# Patient Record
Sex: FEMALE | Race: BLACK OR AFRICAN AMERICAN | Employment: UNEMPLOYED | URBAN - METROPOLITAN AREA
[De-identification: names, ages, dates, MRNs, and addresses within clinical notes are randomized per-mention and may not be internally consistent; named-entity substitution may affect disease eponyms.]

---

## 2020-11-03 ENCOUNTER — APPOINTMENT (OUTPATIENT)
Dept: GENERAL RADIOLOGY | Age: 85
End: 2020-11-03
Payer: MEDICARE

## 2020-11-03 ENCOUNTER — HOSPITAL ENCOUNTER (EMERGENCY)
Age: 85
Discharge: HOME | End: 2020-11-03
Attending: EMERGENCY MEDICINE
Payer: MEDICARE

## 2020-11-03 VITALS
DIASTOLIC BLOOD PRESSURE: 74 MMHG | HEART RATE: 72 BPM | TEMPERATURE: 98.9 F | OXYGEN SATURATION: 96 % | SYSTOLIC BLOOD PRESSURE: 123 MMHG | RESPIRATION RATE: 18 BRPM

## 2020-11-03 PROCEDURE — 73130 X-RAY EXAM OF HAND: CPT

## 2020-11-03 PROCEDURE — 6370000000 HC RX 637 (ALT 250 FOR IP): Performed by: PHYSICIAN ASSISTANT

## 2020-11-03 PROCEDURE — 99283 EMERGENCY DEPT VISIT LOW MDM: CPT

## 2020-11-03 PROCEDURE — 73030 X-RAY EXAM OF SHOULDER: CPT

## 2020-11-03 RX ORDER — HYDROCODONE BITARTRATE AND ACETAMINOPHEN 5; 325 MG/1; MG/1
1 TABLET ORAL ONCE
Status: COMPLETED | OUTPATIENT
Start: 2020-11-03 | End: 2020-11-03

## 2020-11-03 RX ORDER — HYDROCODONE BITARTRATE AND ACETAMINOPHEN 5; 325 MG/1; MG/1
1 TABLET ORAL EVERY 6 HOURS PRN
Qty: 10 TABLET | Refills: 0 | Status: SHIPPED | OUTPATIENT
Start: 2020-11-03 | End: 2020-11-06

## 2020-11-03 RX ORDER — DOCUSATE SODIUM 100 MG/1
100 CAPSULE, LIQUID FILLED ORAL ONCE
Qty: 1 CAPSULE | Refills: 0 | Status: SHIPPED | OUTPATIENT
Start: 2020-11-03 | End: 2020-11-03

## 2020-11-03 RX ORDER — CEPHALEXIN 500 MG/1
500 CAPSULE ORAL 4 TIMES DAILY
Qty: 28 CAPSULE | Refills: 0 | Status: SHIPPED | OUTPATIENT
Start: 2020-11-03 | End: 2020-11-10

## 2020-11-03 RX ORDER — NAPROXEN 500 MG/1
500 TABLET ORAL 2 TIMES DAILY
Qty: 20 TABLET | Refills: 0 | Status: SHIPPED | OUTPATIENT
Start: 2020-11-03 | End: 2021-10-18 | Stop reason: ALTCHOICE

## 2020-11-03 RX ORDER — AMLODIPINE BESYLATE 10 MG/1
10 TABLET ORAL DAILY
COMMUNITY
End: 2021-10-15 | Stop reason: SDUPTHER

## 2020-11-03 RX ORDER — CARVEDILOL 3.12 MG/1
3.12 TABLET ORAL 2 TIMES DAILY WITH MEALS
COMMUNITY
End: 2021-10-15 | Stop reason: SDUPTHER

## 2020-11-03 RX ORDER — ATORVASTATIN CALCIUM 40 MG/1
40 TABLET, FILM COATED ORAL DAILY
COMMUNITY
End: 2021-10-15 | Stop reason: SDUPTHER

## 2020-11-03 RX ORDER — DONEPEZIL HYDROCHLORIDE 10 MG/1
10 TABLET, ORALLY DISINTEGRATING ORAL NIGHTLY
Status: ON HOLD | COMMUNITY
End: 2021-11-19 | Stop reason: HOSPADM

## 2020-11-03 RX ORDER — VITAMIN B COMPLEX
2000 TABLET ORAL DAILY
COMMUNITY
End: 2021-10-15 | Stop reason: SDUPTHER

## 2020-11-03 RX ORDER — NAPROXEN 250 MG/1
500 TABLET ORAL ONCE
Status: COMPLETED | OUTPATIENT
Start: 2020-11-03 | End: 2020-11-03

## 2020-11-03 RX ADMIN — NAPROXEN 500 MG: 250 TABLET ORAL at 20:49

## 2020-11-03 RX ADMIN — HYDROCODONE BITARTRATE AND ACETAMINOPHEN 1 TABLET: 5; 325 TABLET ORAL at 20:49

## 2020-11-03 ASSESSMENT — PAIN SCALES - GENERAL
PAINLEVEL_OUTOF10: 10
PAINLEVEL_OUTOF10: 10

## 2020-11-03 ASSESSMENT — ENCOUNTER SYMPTOMS
NAUSEA: 0
VOMITING: 0
ABDOMINAL PAIN: 0
SHORTNESS OF BREATH: 0
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
## 2020-11-04 NOTE — ED NOTES
Bed: [facility]-03  Expected date:   Expected time:   Means of arrival:   Comments:  Nilo Bentley RN  11/03/20 7662

## 2020-11-09 ENCOUNTER — NURSE TRIAGE (OUTPATIENT)
Dept: OTHER | Facility: CLINIC | Age: 85
End: 2020-11-09

## 2020-11-09 NOTE — TELEPHONE ENCOUNTER
[facility]. Brief description of triage: swollen fingers left     Triage indicates for patient to be seen today or tomorrow    Care advice provided, patient verbalizes understanding; denies any other questions or concerns; instructed to call back for any new or worsening symptoms. Writer provided warm transfer to [place] [facility] for appointment scheduling. Patient grandson asking to speak with [address] regarding complaints with hospital in [place], [place]. I explained to him that any facility would be unable to speak as to another facilities procedures and protocols and it would be best to direct any questions he has for that hospital to them directly. He states that he has spoken to them and was not satisfied with their answers. I again reaffirmed that no facility would be able to answer his questions regarding the rules an operating procedures of another. Attention Provider: Thank you for allowing me to participate in the care of your patient. The patient was connected to triage in response to information provided to the [facility]. Please do not respond through this encounter as the response is not directed to a shared pool. Reason for Disposition   Followed an injury   Finger joint can't be opened (straightened) or closed (bent) completely    Answer Assessment - Initial Assessment Questions  1. ONSET: \"When did the pain start? \"       About two weeks ago    2. LOCATION and RADIATION: \"Where is the pain located? \"  (e.g., fingertip, around nail, joint, entire   finger)       Right index finger and thumb    3. SEVERITY: \"How bad is the pain? \" \"What does it keep you from doing? \"   (Scale 1-10; or mild, moderate, severe)   - MILD - doesn't interfere with normal activities    - MODERATE - interferes with normal activities or awakens from sleep   - SEVERE - excruciating pain, unable to hold a glass of water or bend finger even a little      10/10    4. APPEARANCE: \"What does the finger look like? \" (e.g., redness, swelling, bruising, pallor)      Swollen     5. WORK OR EXERCISE: \"Has there been any recent work or exercise that involved this part of the body? \"      Tanja Gilbertoo two weeks ago and dislocated shoulder same side    6. CAUSE: \"What do you think is causing the pain? \"      Unsure    7. AGGRAVATING FACTORS: \"What makes the pain worse? \" (e.g., using computer)      Patient refuses to use it normally, she favors finger and thumb    8. OTHER SYMPTOMS: \"Do you have any other symptoms? \" (e.g., fever, neck pain, numbness)      Not since antibiotcs    9. PREGNANCY: \"Is there any chance you are pregnant? \" \"When was your last menstrual period? \"      N/A    Protocols used:  FINGER PAIN-ADULT-[place], FINGER INJURY-ADULT-[place]

## 2020-11-10 ENCOUNTER — OFFICE VISIT (OUTPATIENT)
Dept: FAMILY MEDICINE CLINIC | Age: 85
End: 2020-11-10
Payer: COMMERCIAL

## 2020-11-10 VITALS
OXYGEN SATURATION: 100 % | DIASTOLIC BLOOD PRESSURE: 76 MMHG | HEIGHT: 60 IN | BODY MASS INDEX: 28.86 KG/M2 | HEART RATE: 73 BPM | WEIGHT: 147 LBS | SYSTOLIC BLOOD PRESSURE: 132 MMHG

## 2020-11-10 PROCEDURE — 99202 OFFICE O/P NEW SF 15 MIN: CPT | Performed by: FAMILY MEDICINE

## 2020-11-10 ASSESSMENT — ENCOUNTER SYMPTOMS
COUGH: 0
BLOOD IN STOOL: 0
SHORTNESS OF BREATH: 0
NAUSEA: 0
SORE THROAT: 0
ABDOMINAL PAIN: 0
VOMITING: 0
CONSTIPATION: 1

## 2020-11-10 NOTE — PATIENT INSTRUCTIONS
Patient Education      Go to the ER ASAP if you develop any chest pain, shortness of breath, facial droop, slurred speech, weakness/numbness in your arms or legs or double vision! Hand Pain: Care Instructions  Your Care Instructions     Common causes of hand pain are overuse and injuries, such as might happen during sports or home repair projects. Everyday wear and tear, especially as you get older, also can cause hand pain. Most minor hand injuries will heal on their own, and home treatment is usually all you need to do. If you have sudden and severe pain, you may need tests and treatment. Follow-up care is a key part of your treatment and safety. Be sure to make and go to all appointments, and call your doctor if you are having problems. It's also a good idea to know your test results and keep a list of the medicines you take. How can you care for yourself at home? · Take pain medicines exactly as directed. ? If the doctor gave you a prescription medicine for pain, take it as prescribed. ? If you are not taking a prescription pain medicine, ask your doctor if you can take an over-the-counter medicine. · Rest and protect your hand. Take a break from any activity that may cause pain. · Put ice or a cold pack on your hand for 10 to 20 minutes at a time. Put a thin cloth between the ice and your skin. · Prop up the sore hand on a pillow when you ice it or anytime you sit or lie down during the next 3 days. Try to keep it above the level of your heart. This will help reduce swelling. · If your doctor recommends a sling, splint, or elastic bandage to support your hand, wear it as directed. When should you call for help? Call 911 anytime you think you may need emergency care. For example, call if:    · Your hand turns cool or pale or changes color.    Call your doctor now or seek immediate medical care if:    · You cannot move your hand.     · Your hand pops, moves out of its normal position, and then returns to its normal position.     · You have signs of infection, such as:  ? Increased pain, swelling, warmth, or redness. ? Red streaks leading from the sore area. ? Pus draining from a place on your hand. ? A fever.     · Your hand feels numb or tingly. Watch closely for changes in your health, and be sure to contact your doctor if:    · Your hand feels unstable when you try to use it.     · You do not get better as expected.     · You have any new symptoms, such as swelling.     · Bruises from an injury to your hand last longer than 2 weeks. Where can you learn more? Go to https://[employer]What the [employer]rend. org and sign in to your [employer] account. Enter R273 in the [employer] box to learn more about \"Hand Pain: Care Instructions. \"     If you do not have an account, please click on the \"Sign Up Now\" link. Current as of: June 26, 2019               Content Version: 12.6  © 0642-8043 [employer]. Care instructions adapted under license by [facility] ([facility]). If you have questions about a medical condition or this instruction, always ask your healthcare professional. [place] [address] any warranty or liability for your use of this information.
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
## 2020-11-12 ENCOUNTER — OFFICE VISIT (OUTPATIENT)
Dept: ORTHOPEDIC SURGERY | Age: 85
End: 2020-11-12
Payer: COMMERCIAL

## 2020-11-12 VITALS — HEIGHT: 60 IN | BODY MASS INDEX: 28.86 KG/M2 | TEMPERATURE: 98.2 F | WEIGHT: 147 LBS

## 2020-11-12 PROBLEM — S60.221A CONTUSION OF RIGHT HAND: Status: ACTIVE | Noted: 2020-11-12

## 2020-11-12 PROCEDURE — 99203 OFFICE O/P NEW LOW 30 MIN: CPT | Performed by: ORTHOPAEDIC SURGERY
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
## 2020-12-07 ENCOUNTER — OFFICE VISIT (OUTPATIENT)
Dept: ORTHOPEDIC SURGERY | Age: 85
End: 2020-12-07
Payer: COMMERCIAL

## 2020-12-07 VITALS — HEIGHT: 60 IN | WEIGHT: 147 LBS | BODY MASS INDEX: 28.86 KG/M2 | TEMPERATURE: 97.3 F

## 2020-12-07 PROCEDURE — 99212 OFFICE O/P EST SF 10 MIN: CPT | Performed by: ORTHOPAEDIC SURGERY

## 2021-01-25 ENCOUNTER — TELEPHONE (OUTPATIENT)
Dept: FAMILY MEDICINE CLINIC | Age: 86
End: 2021-01-25

## 2021-01-25 NOTE — TELEPHONE ENCOUNTER
----- Message from Timmy Abbott sent at 1/25/2021 12:52 PM EST -----  Subject: Appointment Request    Reason for Call: Routine Existing Condition Follow Up    QUESTIONS  Type of Appointment? Established Patient  Reason for appointment request? Other - NO APPT SHOWN   Additional Information for Provider? PT ESTABLISHED WITH DR. POWELL 11/10/20 AND JOEL FOUNTAIN / ENEDELIA CALLED TO SCHEDULE THE PT FOR A FOLLOW UP   APPOINTMENT. PLEASE CALL TO SCHEDULE. PC NOT LISTED IN SALES FORCE.  ---------------------------------------------------------------------------  --------------  CALL BACK INFO  What is the best way for the office to contact you? OK to leave message on   voicemail  Do not leave any message   patient will call back for answer  Preferred Call Back Phone Number? [phone]  ---------------------------------------------------------------------------  --------------  SCRIPT ANSWERS  Relationship to Patient? Other  Representative Name? PATY ZARATE   Additional information verified (besides Name and Date of Birth)? Address  Appointment reason? Well Care/Follow Ups  Select a Well Care/Follow Ups appointment reason? Adult Existing Condition   Follow Up [Diabetes   CHF   COPD   Hypertension/Blood Pressure Check]  (Is the patient requesting to be seen urgently for their symptoms?)? No  Is this follow up request related to routine Diabetes Management? No  Are you having any new concerns about your existing condition? No  Have you been diagnosed with   tested for   or told that you are suspected of having COVID-19 (Coronavirus)? No  Have you had a fever or taken medication to treat a fever within the past   3 days? No  Have you had a cough   shortness of breath or flu-like symptoms within the past 3 days? No  Do you currently have flu-like symptoms including fever or chills   cough   shortness of breath   or difficulty breathing   or new loss of taste or smell?  No  (Service Expert - click yes below to proceed with [employer] As Usual   Scheduling)?  Yes

## 2021-01-26 ENCOUNTER — OFFICE VISIT (OUTPATIENT)
Dept: FAMILY MEDICINE CLINIC | Age: 86
End: 2021-01-26
Payer: MEDICARE

## 2021-01-26 VITALS
SYSTOLIC BLOOD PRESSURE: 122 MMHG | WEIGHT: 133 LBS | RESPIRATION RATE: 20 BRPM | HEIGHT: 61 IN | BODY MASS INDEX: 25.11 KG/M2 | DIASTOLIC BLOOD PRESSURE: 70 MMHG | HEART RATE: 78 BPM | TEMPERATURE: 97.9 F | OXYGEN SATURATION: 97 %

## 2021-01-26 DIAGNOSIS — R01.1 HEART MURMUR: ICD-10-CM

## 2021-01-26 DIAGNOSIS — R55 PRE-SYNCOPE: Primary | ICD-10-CM

## 2021-01-26 DIAGNOSIS — I25.10 CORONARY ARTERY DISEASE INVOLVING NATIVE CORONARY ARTERY OF NATIVE HEART WITHOUT ANGINA PECTORIS: ICD-10-CM

## 2021-01-26 DIAGNOSIS — I10 ESSENTIAL HYPERTENSION: ICD-10-CM

## 2021-01-26 PROCEDURE — 99214 OFFICE O/P EST MOD 30 MIN: CPT | Performed by: FAMILY MEDICINE

## 2021-01-26 ASSESSMENT — ENCOUNTER SYMPTOMS
ABDOMINAL PAIN: 0
NAUSEA: 0
COUGH: 0
SHORTNESS OF BREATH: 0
BLOOD IN STOOL: 0
SORE THROAT: 0
VOMITING: 0

## 2021-01-26 NOTE — PROGRESS NOTES
Chief Complaint   Patient presents with    Loss of Consciousness     pt had on syncopal episode yesterday, mothing since         Cassidy Gaspar is a 80 y.o. female who presents complaining of a pre syncopal episode yesterday after taking a shower and rinsing her mouth with peroxide. Pt states that she did feel dizzy but did not lose consciousness and denies any associated CP, SOB or fever. Pt also did not skip a meal    Pt has a hx of HTN, CAD and dyslipidemia and is on norvasc, Baby ASA, lipitor and coreg      Pt has a hx of Alzheimer's dementia and used to be on aricept medication in the past    Of note pt did see Hand Surgery as directed regarding her R hand pain/swelling and was Dx with a contusion and has been doing home physical therapy with improvement     Pt is a nonsmoker and denies alcohol use      Review of Systems   Constitutional: Negative for fatigue and fever. HENT: Negative for nosebleeds and sore throat. Eyes:        Negative blurred vision or diplopia   Respiratory: Negative for cough and shortness of breath. Cardiovascular: Negative for chest pain, palpitations and leg swelling. Gastrointestinal: Negative for abdominal pain, blood in stool, nausea and vomiting. Negative melena or indigestion   Endocrine: Negative for polydipsia and polyuria. Genitourinary: Negative for dysuria and hematuria. Musculoskeletal: Negative for arthralgias. Skin: Negative for rash. Neurological: Positive for dizziness (episode yesterday). Negative for seizures, syncope, speech difficulty, weakness and headaches. Psychiatric/Behavioral: Negative for dysphoric mood. The patient is not nervous/anxious. Vitals:    01/26/21 1314   BP: 122/70   Pulse: 78   Resp: 20   Temp: 97.9 °F (36.6 °C)   SpO2: 97%         Physical Exam  Vitals signs reviewed. Constitutional:       General: She is not in acute distress.   HENT:      Mouth/Throat:      Mouth: Mucous membranes are moist.      Pharynx: Oropharynx is clear. Eyes:      General: No scleral icterus. Comments: Pink conjunctivae    Neck:      Thyroid: No thyromegaly. Comments: No carotid bruits noted B/L  Cardiovascular:      Heart sounds: Murmur (2/6 systolic) present. No friction rub. No gallop. Comments: Positive occasional ectopic beat noted  Pulmonary:      Effort: Pulmonary effort is normal.      Breath sounds: Normal breath sounds. Abdominal:      Palpations: Abdomen is soft. Tenderness: There is no abdominal tenderness. Musculoskeletal:      Comments: No leg edema noted B/L   Lymphadenopathy:      Cervical: No cervical adenopathy. Skin:     Findings: No rash. Neurological:      Mental Status: She is alert. Comments: Cranial nerves 2 - 12 grossly intact; Muscle strength 5/5 throughout   Psychiatric:         Mood and Affect: Mood normal.         ASSESSMENT AND PLAN    1. Essential hypertension  Patient clinically stable on present medications  - CBC Auto Differential; Future  - Lipid Panel; Future  - Comprehensive Metabolic Panel; Future  - TSH with Reflex; Future  - [facility]acria [facility], [facility]    2. Coronary artery disease involving native coronary artery of native heart without angina pectoris  Patient clinically stable on present medications and denies any CP or SOB  - Lipid Panel; Future  - Comprehensive Metabolic Panel; Future  - [facility] Garcia [facility], [facility]    3. Pre-syncope  Pt with an episode of dizziness and presyncope yesterday and unsure as to etiology and will check bloodwork for further evaluation. May be secondary to valvular heart disease given pt's noted heart murmur on PE and will Refer pt to Cardiology for evaluation and 2D Echo. Pt is with a nonfocal neuro exam  - CBC Auto Differential; Future  - Comprehensive Metabolic Panel; Future  - TSH with Reflex; Future  - [facility] Garcia [facility], [facility]    4.  Heart murmur  - [facility] Angy Vega MD, Cardiology, Leti Reece MD      Return in about 2 months (around 3/26/2021). Patient Instructions       Patient Education      Go to the ER ASAP if you develop any chest pain, shortness of breath, facial droop, slurred speech, weakness/numbness in your arms or legs or double vision! Fainting: Care Instructions  Your Care Instructions     When you faint, or pass out, you lose consciousness for a short time. A brief drop in blood flow to the brain often causes it. When you fall or lie down, more blood flows to your brain and you regain consciousness. Emotional stress, pain, or overheatingespecially if you have been standingcan make you faint. In these cases, fainting is usually not serious. But fainting can be a sign of a more serious problem. Your doctor may want you to have more tests to rule out other causes. The treatment you need depends on the reason why you fainted. The doctor has checked you carefully, but problems can develop later. If you notice any problems or new symptoms, get medical treatment right away. Follow-up care is a key part of your treatment and safety. Be sure to make and go to all appointments, and call your doctor if you are having problems. It's also a good idea to know your test results and keep a list of the medicines you take. How can you care for yourself at home? · Drink plenty of fluids to prevent dehydration. If you have kidney, heart, or liver disease and have to limit fluids, talk with your doctor before you increase your fluid intake. When should you call for help? Call 911 anytime you think you may need emergency care. For example, call if:    · You have symptoms of a heart problem. These may include:  ? Chest pain or pressure. ? Severe trouble breathing. ? A fast or irregular heartbeat. ? Lightheadedness or sudden weakness. ? Coughing up pink, foamy mucus. ? Passing out.   After you call 911, the  may tell you to chew 1 adult-strength or 2 to 4 low-dose aspirin. Wait for an ambulance. Do not try to drive yourself.     · You have symptoms of a stroke. These may include:  ? Sudden numbness, tingling, weakness, or loss of movement in your face, arm, or leg, especially on only one side of your body. ? Sudden vision changes. ? Sudden trouble speaking. ? Sudden confusion or trouble understanding simple statements. ? Sudden problems with walking or balance. ? A sudden, severe headache that is different from past headaches.     · You passed out (lost consciousness) again. Watch closely for changes in your health, and be sure to contact your doctor if:    · You do not get better as expected. Where can you learn more? Go to https://[employer]ICES.[employer]. org and sign in to your [employer] account. Enter F460 in the [employer] box to learn more about \"Fainting: Care Instructions. \"     If you do not have an account, please click on the \"Sign Up Now\" link. Current as of: June 26, 2019               Content Version: 12.6  © 3438-5576 [employer] Incorporated. Care instructions adapted under license by [facility] ([facility]). If you have questions about a medical condition or this instruction, always ask your healthcare professional. Meeta Chase any warranty or liability for your use of this information.

## 2021-01-26 NOTE — PATIENT INSTRUCTIONS
Patient Education      Go to the ER ASAP if you develop any chest pain, shortness of breath, facial droop, slurred speech, weakness/numbness in your arms or legs or double vision! Fainting: Care Instructions  Your Care Instructions     When you faint, or pass out, you lose consciousness for a short time. A brief drop in blood flow to the brain often causes it. When you fall or lie down, more blood flows to your brain and you regain consciousness. Emotional stress, pain, or overheatingespecially if you have been standingcan make you faint. In these cases, fainting is usually not serious. But fainting can be a sign of a more serious problem. Your doctor may want you to have more tests to rule out other causes. The treatment you need depends on the reason why you fainted. The doctor has checked you carefully, but problems can develop later. If you notice any problems or new symptoms, get medical treatment right away. Follow-up care is a key part of your treatment and safety. Be sure to make and go to all appointments, and call your doctor if you are having problems. It's also a good idea to know your test results and keep a list of the medicines you take. How can you care for yourself at home? · Drink plenty of fluids to prevent dehydration. If you have kidney, heart, or liver disease and have to limit fluids, talk with your doctor before you increase your fluid intake. When should you call for help? Call 911 anytime you think you may need emergency care. For example, call if:    · You have symptoms of a heart problem. These may include:  ? Chest pain or pressure. ? Severe trouble breathing. ? A fast or irregular heartbeat. ? Lightheadedness or sudden weakness. ? Coughing up pink, foamy mucus. ? Passing out. After you call 911, the  may tell you to chew 1 adult-strength or 2 to 4 low-dose aspirin. Wait for an ambulance. Do not try to drive yourself.   · You have symptoms of a stroke. These may include:  ? Sudden numbness, tingling, weakness, or loss of movement in your face, arm, or leg, especially on only one side of your body. ? Sudden vision changes. ? Sudden trouble speaking. ? Sudden confusion or trouble understanding simple statements. ? Sudden problems with walking or balance. ? A sudden, severe headache that is different from past headaches.     · You passed out (lost consciousness) again. Watch closely for changes in your health, and be sure to contact your doctor if:    · You do not get better as expected. Where can you learn more? Go to https://FIGSpepiceweb.[employer]is. org and sign in to your [employer] account. Enter F619 in the [employer] box to learn more about \"Fainting: Care Instructions. \"     If you do not have an account, please click on the \"Sign Up Now\" link. Current as of: June 26, 2019               Content Version: 12.6  © 0287-6851 [employer]. Care instructions adapted under license by [facility] ([facility]). If you have questions about a medical condition or this instruction, always ask your healthcare professional. [place] [address] any warranty or liability for your use of this information.

## 2021-02-05 ENCOUNTER — OFFICE VISIT (OUTPATIENT)
Dept: CARDIOLOGY CLINIC | Age: 86
End: 2021-02-05
Payer: MEDICARE

## 2021-02-05 ENCOUNTER — TELEPHONE (OUTPATIENT)
Dept: CARDIOLOGY CLINIC | Age: 86
End: 2021-02-05

## 2021-02-05 VITALS
SYSTOLIC BLOOD PRESSURE: 130 MMHG | DIASTOLIC BLOOD PRESSURE: 78 MMHG | WEIGHT: 131.8 LBS | TEMPERATURE: 97.8 F | HEIGHT: 61 IN | BODY MASS INDEX: 24.88 KG/M2

## 2021-02-05 DIAGNOSIS — R01.1 MURMUR, CARDIAC: ICD-10-CM

## 2021-02-05 DIAGNOSIS — F03.90 DEMENTIA WITHOUT BEHAVIORAL DISTURBANCE, UNSPECIFIED DEMENTIA TYPE: ICD-10-CM

## 2021-02-05 DIAGNOSIS — I10 BENIGN ESSENTIAL HTN: ICD-10-CM

## 2021-02-05 DIAGNOSIS — I25.10 CORONARY ARTERY DISEASE INVOLVING NATIVE CORONARY ARTERY OF NATIVE HEART WITHOUT ANGINA PECTORIS: ICD-10-CM

## 2021-02-05 DIAGNOSIS — I10 ESSENTIAL HYPERTENSION: ICD-10-CM

## 2021-02-05 DIAGNOSIS — R55 NEAR SYNCOPE: ICD-10-CM

## 2021-02-05 DIAGNOSIS — I25.10 CORONARY ARTERY DISEASE INVOLVING NATIVE CORONARY ARTERY OF NATIVE HEART WITHOUT ANGINA PECTORIS: Primary | ICD-10-CM

## 2021-02-05 DIAGNOSIS — Z00.00 EXAMINATION: ICD-10-CM

## 2021-02-05 DIAGNOSIS — R55 PRE-SYNCOPE: ICD-10-CM

## 2021-02-05 LAB
A/G RATIO: 0.8 (ref 1.1–2.2)
ALBUMIN SERPL-MCNC: 3.5 G/DL (ref 3.4–5)
ALP BLD-CCNC: 107 U/L (ref 40–129)
ALT SERPL-CCNC: 13 U/L (ref 10–40)
ANION GAP SERPL CALCULATED.3IONS-SCNC: 14 MMOL/L (ref 3–16)
AST SERPL-CCNC: 30 U/L (ref 15–37)
BASOPHILS ABSOLUTE: 0 K/UL (ref 0–0.2)
BASOPHILS RELATIVE PERCENT: 0.3 %
BILIRUB SERPL-MCNC: 0.5 MG/DL (ref 0–1)
BUN BLDV-MCNC: 12 MG/DL (ref 7–20)
CALCIUM SERPL-MCNC: 9.2 MG/DL (ref 8.3–10.6)
CHLORIDE BLD-SCNC: 104 MMOL/L (ref 99–110)
CHOLESTEROL, TOTAL: 103 MG/DL (ref 0–199)
CO2: 24 MMOL/L (ref 21–32)
CREAT SERPL-MCNC: 0.9 MG/DL (ref 0.6–1.2)
EOSINOPHILS ABSOLUTE: 0.1 K/UL (ref 0–0.6)
EOSINOPHILS RELATIVE PERCENT: 0.7 %
GFR AFRICAN AMERICAN: >60
GFR NON-AFRICAN AMERICAN: 59
GLOBULIN: 4.4 G/DL
GLUCOSE BLD-MCNC: 119 MG/DL (ref 70–99)
HCT VFR BLD CALC: 35.8 % (ref 36–48)
HDLC SERPL-MCNC: 37 MG/DL (ref 40–60)
HEMOGLOBIN: 11.6 G/DL (ref 12–16)
LDL CHOLESTEROL CALCULATED: 41 MG/DL
LYMPHOCYTES ABSOLUTE: 1.8 K/UL (ref 1–5.1)
LYMPHOCYTES RELATIVE PERCENT: 19.1 %
MCH RBC QN AUTO: 29.2 PG (ref 26–34)
MCHC RBC AUTO-ENTMCNC: 32.4 G/DL (ref 31–36)
MCV RBC AUTO: 90.2 FL (ref 80–100)
MONOCYTES ABSOLUTE: 0.7 K/UL (ref 0–1.3)
MONOCYTES RELATIVE PERCENT: 7.5 %
NEUTROPHILS ABSOLUTE: 6.9 K/UL (ref 1.7–7.7)
NEUTROPHILS RELATIVE PERCENT: 72.4 %
PDW BLD-RTO: 15 % (ref 12.4–15.4)
PLATELET # BLD: 245 K/UL (ref 135–450)
PMV BLD AUTO: 10.3 FL (ref 5–10.5)
POTASSIUM SERPL-SCNC: 3.9 MMOL/L (ref 3.5–5.1)
RBC # BLD: 3.98 M/UL (ref 4–5.2)
SODIUM BLD-SCNC: 142 MMOL/L (ref 136–145)
TOTAL PROTEIN: 7.9 G/DL (ref 6.4–8.2)
TRIGL SERPL-MCNC: 124 MG/DL (ref 0–150)
TSH REFLEX: 1.74 UIU/ML (ref 0.27–4.2)
VLDLC SERPL CALC-MCNC: 25 MG/DL
WBC # BLD: 9.6 K/UL (ref 4–11)

## 2021-02-05 PROCEDURE — 99204 OFFICE O/P NEW MOD 45 MIN: CPT | Performed by: INTERNAL MEDICINE

## 2021-02-05 PROCEDURE — 93000 ELECTROCARDIOGRAM COMPLETE: CPT | Performed by: INTERNAL MEDICINE

## 2021-02-05 NOTE — PROGRESS NOTES
80 y.o. here for syncope. Here with grandson/POA Denisha Nails.  Pt fell at home. Saw Dr. Doran Held recently as a new pt. Last week, the patient \"fell out. \" Legs got weak, eyes rolled back, but she did NOT pass out. She lost all strength and fell. Came back quick, felt ok, saw PCP, and was then referred for me. No problems since then  No cp. No sob. No n/v. No LE edema. Sleeps in bed, laying flat. No orthopnea or PND. PMH:  CAD  Hyperlipidemia  Essential HTN  Dementia    PSH: No prior cardiac procedures. Had hysterectomy. Social History:  Never smoked. No EtOH. FH: Not contributory    Review of Systems   General: No fevers, chills, fatigue, or night sweats. No abnormal changes in weight. HEENT: No blurry or decreased vision. No changes in hearing, nasal discharge or sore throat. Cardiovascular: See HPI. No cramping in legs or buttocks when walking. Respiratory: No cough, hemoptysis, or wheezing. No history of asthma. Gastrointestinal: No abdominal pain, hematochezia, melana, or history of GI ulcers. Genito-Urinary: No dysuria or hematuria. No urgency or polyuria. Musculoskeletal: No new complaints of joint pain, joint swelling or muscular weakness/soreness. Neurological: No dizziness or headaches. No numbness/tingling, speech problems or weakness. No history of a stroke or TIA. Psychological: No anxiety or depression. Hematological and Lymphatic: No abnormal bleeding or bruising, blood clots, jaundice. Endocrine: No malaise/lethargy, palpitations, polydipsia/polyuria, temperature intolerance or unexpected weight changes. Skin: No rashes or non-healing ulcers. PE:  Blood pressure 130/78, temperature 97.8 °F (36.6 °C), height 5' 1\" (1.549 m), weight 131 lb 12.8 oz (59.8 kg). General (appearance): Well devel. No distress  Eyes: Anicteric. Neck: No JVD  Ears/Nose/Mouth/Thorat: No cyanosis  CV: RRR. 2/6 otis. Early peaking. Good S2.   Respiratory:  Clear B, normal respiratory effort  GI: Abd s/nt/nd. Skin: Warm, dry. No rashes  Neuro/Psych: Alert and oriented self. Not to year or place ([address])  Ext:  No c/c. No pitting edema  Pulses:  2+ carotid    No results found for: WBC, HGB, HCT, MCV, PLT    No results found for: NA, K, CL, CO2, BUN, CREATININE, GLUCOSE, CALCIUM, PROT, LABALBU, BILITOT, ALKPHOS, AST, ALT, LABGLOM, GFRAA, AGRATIO, GLOB    No results found for: INR, PROTIME    A/P:  80 y.o. here for near syncope. 1. Coronary artery disease involving native coronary artery of native heart without angina pectoris    2. Benign essential HTN    3. Murmur, cardiac    4. Examination    5. Near syncope    6. Dementia without behavioral disturbance, unspecified dementia type ([facility] [address]      Recs:  -Discussed echo. They'd like to pursue to just to get answers on the heart.  -14 day zio patch  -F/U 4 mo  -CMP, lipids, cbc, tsh ordered. Becka Collins MD, [facility]

## 2021-02-26 ENCOUNTER — PROCEDURE VISIT (OUTPATIENT)
Dept: CARDIOLOGY CLINIC | Age: 86
End: 2021-02-26
Payer: MEDICARE

## 2021-02-26 DIAGNOSIS — I25.10 CORONARY ARTERY DISEASE INVOLVING NATIVE CORONARY ARTERY OF NATIVE HEART WITHOUT ANGINA PECTORIS: ICD-10-CM

## 2021-02-26 DIAGNOSIS — R55 NEAR SYNCOPE: ICD-10-CM

## 2021-02-26 DIAGNOSIS — R01.1 MURMUR, CARDIAC: ICD-10-CM

## 2021-02-26 LAB
LV EF: 63 %
LVEF MODALITY: NORMAL

## 2021-02-26 PROCEDURE — 93306 TTE W/DOPPLER COMPLETE: CPT | Performed by: INTERNAL MEDICINE

## 2021-05-10 ENCOUNTER — OFFICE VISIT (OUTPATIENT)
Dept: PRIMARY CARE CLINIC | Age: 86
End: 2021-05-10
Payer: MEDICARE

## 2021-05-10 VITALS
HEART RATE: 72 BPM | SYSTOLIC BLOOD PRESSURE: 130 MMHG | OXYGEN SATURATION: 98 % | BODY MASS INDEX: 25.32 KG/M2 | DIASTOLIC BLOOD PRESSURE: 82 MMHG | WEIGHT: 134 LBS

## 2021-05-10 DIAGNOSIS — I10 ESSENTIAL HYPERTENSION: Primary | ICD-10-CM

## 2021-05-10 DIAGNOSIS — I25.10 CORONARY ARTERY DISEASE INVOLVING NATIVE HEART WITHOUT ANGINA PECTORIS, UNSPECIFIED VESSEL OR LESION TYPE: ICD-10-CM

## 2021-05-10 DIAGNOSIS — R09.89 RESPIRATORY CRACKLES AT LEFT LUNG BASE: ICD-10-CM

## 2021-05-10 DIAGNOSIS — I38 VALVULAR HEART DISEASE: ICD-10-CM

## 2021-05-10 DIAGNOSIS — R60.0 BILATERAL LEG EDEMA: ICD-10-CM

## 2021-05-10 PROCEDURE — 99214 OFFICE O/P EST MOD 30 MIN: CPT | Performed by: FAMILY MEDICINE

## 2021-05-10 ASSESSMENT — ENCOUNTER SYMPTOMS
BLOOD IN STOOL: 0
SHORTNESS OF BREATH: 0
VOMITING: 0
NAUSEA: 0
ABDOMINAL PAIN: 0
RHINORRHEA: 1
COUGH: 1
SORE THROAT: 0

## 2021-05-10 ASSESSMENT — PATIENT HEALTH QUESTIONNAIRE - PHQ9
1. LITTLE INTEREST OR PLEASURE IN DOING THINGS: 0
2. FEELING DOWN, DEPRESSED OR HOPELESS: 0
SUM OF ALL RESPONSES TO PHQ9 QUESTIONS 1 & 2: 0

## 2021-05-10 NOTE — PATIENT INSTRUCTIONS
embolism). These may include:  ? Sudden chest pain. ? Trouble breathing. ? Coughing up blood. Call your doctor now or seek immediate medical care if:    · You have signs of a blood clot, such as:  ? Pain in your calf, back of the knee, thigh, or groin. ? Redness and swelling in your leg or groin.     · You have symptoms of infection, such as:  ? Increased pain, swelling, warmth, or redness. ? Red streaks or pus. ? A fever. Watch closely for changes in your health, and be sure to contact your doctor if:    · Your swelling is getting worse.     · You have new or worsening pain in your legs.     · You do not get better as expected. Where can you learn more? Go to https://Big Fish.InSightec. org and sign in to your [employer]jam account. Enter K007 in the [facility] box to learn more about \"Leg and Ankle Edema: Care Instructions. \"     If you do not have an account, please click on the \"Sign Up Now\" link. Current as of: February 26, 2020               Content Version: 12.8  © 8006-9984 Healthwise, Incorporated. Care instructions adapted under license by [facility] ([facility]). If you have questions about a medical condition or this instruction, always ask your healthcare professional. [address]  any warranty or liability for your use of this information.

## 2021-05-10 NOTE — PROGRESS NOTES
Chief Complaint   Patient presents with    1 Month Follow-Up     hand contusion/heart murmur         Sulma Roman is a 80 y.o. female who presents for followup visit regarding CAD, HTN, dyslipidemia and syncope. Pt did see Cardiology as directed regarding her syncope and had 2 D echo done which was unremarkable and was ordered a 14 day Zio patch monitor but pt kept on removing it during her sleep. Pt also had bloodwork [TSH, CBC, CMP, lipid panel] done which was unremarkable    Pt has a hx of HTN, CAD and dyslipidemia and is on norvasc, Baby ASA, lipitor and coreg      Pt has a hx of Alzheimer's dementia and used to be on aricept medication in the past     Pt is a nonsmoker and denies alcohol use. Pt did eat a salty meal last night      Cardiology note 02/05/2021  80 y.o. here for syncope. Here with grandson/POA Isi Carvalho.  Pt fell at home. Saw Dr. Liv Bell recently as a new pt. Last week, the patient \"fell out. \" Legs got weak, eyes rolled back, but she did NOT pass out. She lost all strength and fell. Came back quick, felt ok, saw PCP, and was then referred for me. No problems since then  No cp. No sob. No n/v. No LE edema. Sleeps in bed, laying flat. No orthopnea or PND. A/P:  80 y.o. here for near syncope. 1. Coronary artery disease involving native coronary artery of native heart without angina pectoris    2. Benign essential HTN    3. Murmur, cardiac    4. Examination    5. Near syncope    6. Dementia without behavioral disturbance, unspecified dementia type ([address]       Recs:  -Discussed echo. They'd like to pursue to just to get answers on the heart.  -14 day zio patch  -F/U 4 mo  -CMP, lipids, cbc, tsh ordered. 2D Echo 02/26/2021  Summary   Left ventricular cavity size is normal. There is moderately increased left   ventricular wall thickness. There is septal thickening noted, no obvious   evidence of outflow obstruction.  Overall left ventricular systolic function   appears normal. Ejection fraction is visually estimated to be 60-65%. No   regional wall motion abnormalities are noted. Grade I diastolic dysfunction   with normal LV filling pressures. Mild aortic regurgitation. Very small mobile density seen in the aortic   aspect of the right coronary cusp, likely small calcification. Mild pulmonic regurgitation present. Mild tricuspid regurgitation with an RVSP of 34mmHg. Review of Systems   Constitutional: Negative for fatigue and fever. HENT: Positive for rhinorrhea (daily). Negative for nosebleeds and sore throat. Eyes:        Negative blurred vision or diplopia   Respiratory: Positive for cough (dry at times for many years). Negative for shortness of breath. Cardiovascular: Negative for chest pain, palpitations and leg swelling. Gastrointestinal: Negative for abdominal pain, blood in stool, nausea and vomiting. Negative melena or indigestion   Endocrine: Negative for polydipsia and polyuria. Genitourinary: Negative for dysuria and hematuria. Musculoskeletal: Negative for arthralgias. Skin: Negative for rash. Neurological: Negative for dizziness, seizures, syncope, speech difficulty, weakness and headaches. Psychiatric/Behavioral: Negative for dysphoric mood. The patient is not nervous/anxious. Vitals:    05/10/21 1101   BP: 130/82   Pulse: 72   SpO2: 98%         Physical Exam  Vitals signs reviewed. Constitutional:       General: She is not in acute distress. HENT:      Mouth/Throat:      Mouth: Mucous membranes are moist.      Pharynx: Oropharynx is clear. Eyes:      General: No scleral icterus. Comments: Pink conjunctivae    Neck:      Thyroid: No thyromegaly. Comments: No carotid bruits noted B/L  Cardiovascular:      Heart sounds: Murmur (2/6 systolic) present. No friction rub. No gallop. Comments: Positive occasional ectopic beat noted  Pulmonary:      Effort: Pulmonary effort is normal. No respiratory distress.       Comments: Positive crackles noted in L lung base  Abdominal:      Palpations: Abdomen is soft. Tenderness: There is no abdominal tenderness. Musculoskeletal:      Comments: Positive  2 -3+ leg edema but no calf tendernous to palpation noted B/L   Lymphadenopathy:      Cervical: No cervical adenopathy. Skin:     Findings: No rash. Neurological:      Mental Status: She is alert. Comments: Cranial nerves 2 - 12 grossly intact; Muscle strength 5/5 throughout   Psychiatric:         Mood and Affect: Mood normal.         ASSESSMENT AND PLAN    1. Essential hypertension/Bilateral leg edema  Patient clinically stable on present medication but does have leg edema noted on PE and was told to follow a low sodium diet and will reevaluate pt in 6 weeks. Of note pt did have normal thyroid and kidney function on recent bloodwork  - XR CHEST (2 VW); Future    2. Coronary artery disease involving native heart without angina pectoris, unspecified vessel or lesion type  Patient clinically stable on present medications and denies any CP    3. Valvular heart disease  Pt with recent 2 D Echo which showed only mild valvular disease with an EF 60 - 65 % and pt denies any further episodes of presyncope    4. Respiratory crackles at left lung base  Pt does admit to a chronic dry cough but denies any SOB or fever and will check a CXR for further evaluation  - XR CHEST (2 VW); Future            Natanael Hernandez MD      Return in about 6 weeks (around 6/21/2021). Patient Instructions       Patient Education      Go to the ER ASAP if you develop any chest pain, shortness of breath, facial droop, slurred speech, weakness/numbness in your arms or legs or double vision! Leg and Ankle Edema: Care Instructions  Your Care Instructions  Swelling in the legs, ankles, and feet is called edema. It is common after you sit or stand for a while. Long plane flights or car rides often cause swelling in the legs and feet.  You may also have swelling if you have to stand for long periods of time at your job. Problems with the veins in the legs (varicose veins) and changes in hormones can also cause swelling. Sometimes the swelling in the ankles and feet is caused by a more serious problem, such as heart failure, infection, blood clots, or liver or kidney disease. Follow-up care is a key part of your treatment and safety. Be sure to make and go to all appointments, and call your doctor if you are having problems. It's also a good idea to know your test results and keep a list of the medicines you take. How can you care for yourself at home? · If your doctor gave you medicine, take it as prescribed. Call your doctor if you think you are having a problem with your medicine. · Whenever you are resting, raise your legs up. Try to keep the swollen area higher than the level of your heart. · Take breaks from standing or sitting in one position. ? Walk around to increase the blood flow in your lower legs. ? Move your feet and ankles often while you stand, or tighten and relax your leg muscles. · Wear support stockings. Put them on in the morning, before swelling gets worse. · Eat a balanced diet. Lose weight if you need to. · Limit the amount of salt (sodium) in your diet. Salt holds fluid in the body and may increase swelling. When should you call for help? Call 911 anytime you think you may need emergency care. For example, call if:    · You have symptoms of a blood clot in your lung (called a pulmonary embolism). These may include:  ? Sudden chest pain. ? Trouble breathing. ? Coughing up blood. Call your doctor now or seek immediate medical care if:    · You have signs of a blood clot, such as:  ? Pain in your calf, back of the knee, thigh, or groin. ? Redness and swelling in your leg or groin.     · You have symptoms of infection, such as:  ? Increased pain, swelling, warmth, or redness. ? Red streaks or pus. ? A fever.    Watch closely for changes in your health, and be sure to contact your doctor if:    · Your swelling is getting worse.     · You have new or worsening pain in your legs.     · You do not get better as expected. Where can you learn more? Go to https://chcoleeneb.[employer]ap[employer]. org and sign in to your [employer] account. Enter Y086 in the [employer] box to learn more about \"Leg and Ankle Edema: Care Instructions. \"     If you do not have an account, please click on the \"Sign Up Now\" link. Current as of: February 26, 2020               Content Version: 12.8  © 2006-2021 Healthwise, Incorporated. Care instructions adapted under license by [facility] ([facility]). If you have questions about a medical condition or this instruction, always ask your healthcare professional. [address] any warranty or liability for your use of this information.

## 2021-06-04 ENCOUNTER — OFFICE VISIT (OUTPATIENT)
Dept: CARDIOLOGY CLINIC | Age: 86
End: 2021-06-04
Payer: MEDICARE

## 2021-06-04 VITALS
HEIGHT: 60 IN | WEIGHT: 131.8 LBS | SYSTOLIC BLOOD PRESSURE: 120 MMHG | DIASTOLIC BLOOD PRESSURE: 60 MMHG | HEART RATE: 56 BPM | BODY MASS INDEX: 25.87 KG/M2 | OXYGEN SATURATION: 93 %

## 2021-06-04 DIAGNOSIS — F03.90 DEMENTIA WITHOUT BEHAVIORAL DISTURBANCE, UNSPECIFIED DEMENTIA TYPE: ICD-10-CM

## 2021-06-04 DIAGNOSIS — I10 BENIGN ESSENTIAL HTN: ICD-10-CM

## 2021-06-04 DIAGNOSIS — M79.89 PAIN AND SWELLING OF LEFT LOWER LEG: ICD-10-CM

## 2021-06-04 DIAGNOSIS — I25.10 CORONARY ARTERY DISEASE INVOLVING NATIVE CORONARY ARTERY OF NATIVE HEART WITHOUT ANGINA PECTORIS: Primary | ICD-10-CM

## 2021-06-04 DIAGNOSIS — M79.662 PAIN AND SWELLING OF LEFT LOWER LEG: ICD-10-CM

## 2021-06-04 PROCEDURE — 99214 OFFICE O/P EST MOD 30 MIN: CPT | Performed by: INTERNAL MEDICINE

## 2021-06-04 NOTE — PROGRESS NOTES
80 y.o. here for syncope. Here with grandson/POA Ilan Cabral.  No cp. No sob. No n/v/LH/dizziness. No syncope. Took off Zio patch; never could get results for it. Has R leg stiffness. Had a fall since last visit. That was in tub. PMH:  CAD  Hyperlipidemia  Essential HTN  Dementia    PSH: No prior cardiac procedures. Had hysterectomy. Social History:  Never smoked. No EtOH. FH: Not contributory    Review of Systems   General: No fevers, chills, fatigue, or night sweats. No abnormal changes in weight. HEENT: No blurry or decreased vision. No changes in hearing, nasal discharge or sore throat. Cardiovascular: See HPI. No cramping in legs or buttocks when walking. Respiratory: No cough, hemoptysis, or wheezing. No history of asthma. Gastrointestinal: No abdominal pain, hematochezia, melana, or history of GI ulcers. Genito-Urinary: No dysuria or hematuria. No urgency or polyuria. Musculoskeletal: No new complaints of joint pain, joint swelling or muscular weakness/soreness. Neurological: No dizziness or headaches. No numbness/tingling, speech problems or weakness. No history of a stroke or TIA. Psychological: No anxiety or depression. Hematological and Lymphatic: No abnormal bleeding or bruising, blood clots, jaundice. Endocrine: No malaise/lethargy, palpitations, polydipsia/polyuria, temperature intolerance or unexpected weight changes. Skin: No rashes or non-healing ulcers. PE:  Blood pressure 120/60, pulse 56, height 5' (1.524 m), weight 131 lb 12.8 oz (59.8 kg), SpO2 93 %. General (appearance): Well devel. No distress  Eyes: Anicteric. Neck: No JVD  Ears/Nose/Mouth/Thorat: No cyanosis  CV: RRR. 2/6 otis. Early peaking. Good S2. Respiratory:  Clear B, normal respiratory effort  GI: Abd s/nt/nd. Skin: Warm, dry. No rashes  Neuro/Psych: Alert and oriented self. Not to year or place ([email or web address])  Ext:  No c/c.  Mild L>>R swelling  Pulses:  2+ carotid    Lab Results Component Value Date    WBC 9.6 02/05/2021    HGB 11.6 (L) 02/05/2021    HCT 35.8 (L) 02/05/2021    MCV 90.2 02/05/2021     02/05/2021       Lab Results   Component Value Date     02/05/2021    K 3.9 02/05/2021     02/05/2021    CO2 24 02/05/2021    BUN 12 02/05/2021    CREATININE 0.9 02/05/2021    GLUCOSE 119 (H) 02/05/2021    CALCIUM 9.2 02/05/2021    PROT 7.9 02/05/2021    LABALBU 3.5 02/05/2021    BILITOT 0.5 02/05/2021    ALKPHOS 107 02/05/2021    AST 30 02/05/2021    ALT 13 02/05/2021    LABGLOM 59 (A) 02/05/2021    GFRAA >60 02/05/2021    AGRATIO 0.8 (L) 02/05/2021    GLOB 4.4 02/05/2021       No results found for: INR, PROTIME    2/2021 TTE: Mod LVH. EF 60-65%. No RWMA. Grade 1 DD. Mild AI. Small density on the R cuspi, likely Ca. Mild MD. RVSP 34. Current Outpatient Medications   Medication Instructions    amLODIPine (NORVASC) 10 mg, Oral, DAILY    atorvastatin (LIPITOR) 40 mg, Oral, DAILY    carvedilol (COREG) 3.125 mg, Oral, 2 TIMES DAILY WITH MEALS    donepezil (ARICEPT ODT) 10 mg, Oral, NIGHTLY    naproxen (NAPROSYN) 500 mg, Oral, 2 TIMES DAILY    Vitamin D (CHOLECALCIFEROL) 2,000 Units, Oral, DAILY       A/P:  80 y.o. here for f/u.  1. Coronary artery disease involving native coronary artery of native heart without angina pectoris    2. Benign essential HTN    3. Dementia without behavioral disturbance, unspecified dementia type ([facility])    4. Pain and swelling of left lower leg      Recs:  -Venous duplex  -Cont other meds  -F/U with me as needed    Junie Gaona MD, [facility] [place], [place]

## 2021-06-22 ENCOUNTER — HOSPITAL ENCOUNTER (OUTPATIENT)
Dept: VASCULAR LAB | Age: 86
Discharge: HOME | End: 2021-06-22
Payer: MEDICARE

## 2021-06-22 ENCOUNTER — HOSPITAL ENCOUNTER (OUTPATIENT)
Age: 86
Discharge: HOME | End: 2021-06-22
Payer: MEDICARE

## 2021-06-22 ENCOUNTER — HOSPITAL ENCOUNTER (OUTPATIENT)
Dept: GENERAL RADIOLOGY | Age: 86
Discharge: HOME | End: 2021-06-22
Payer: MEDICARE

## 2021-06-22 DIAGNOSIS — R60.0 BILATERAL LEG EDEMA: ICD-10-CM

## 2021-06-22 DIAGNOSIS — M79.89 PAIN AND SWELLING OF LEFT LOWER LEG: ICD-10-CM

## 2021-06-22 DIAGNOSIS — M79.662 PAIN AND SWELLING OF LEFT LOWER LEG: ICD-10-CM

## 2021-06-22 DIAGNOSIS — R09.89 RESPIRATORY CRACKLES AT LEFT LUNG BASE: ICD-10-CM

## 2021-06-22 DIAGNOSIS — I10 ESSENTIAL HYPERTENSION: ICD-10-CM

## 2021-06-22 PROCEDURE — 93970 EXTREMITY STUDY: CPT

## 2021-06-22 PROCEDURE — 71046 X-RAY EXAM CHEST 2 VIEWS: CPT

## 2021-10-15 NOTE — TELEPHONE ENCOUNTER
Medication:   Requested Prescriptions     Pending Prescriptions Disp Refills    atorvastatin (LIPITOR) 40 MG tablet 30 tablet      Sig: Take 1 tablet by mouth daily    Vitamin D (CHOLECALCIFEROL) 25 MCG (1000 UT) TABS tablet 60 tablet      Sig: Take 2 tablets by mouth daily    amLODIPine (NORVASC) 10 MG tablet 30 tablet      Sig: Take 1 tablet by mouth daily    carvedilol (COREG) 3.125 MG tablet 60 tablet      Sig: Take 1 tablet by mouth 2 times daily (with meals)       Last Filled:      Patient Phone Number: [phone] (home)     Last appt: 5/10/2021   Next appt: 10/18/2021    Last Labs DM: No results found for: LABA1C  Last Lipid:   Lab Results   Component Value Date    CHOL 103 02/05/2021    TRIG 124 02/05/2021    HDL 37 02/05/2021    LDLCALC 41 02/05/2021     Last PSA: No results found for: PSA  Last Thyroid: No results found for: TSH, FT3, O1QVTOK, T4FREE, E3OPYEM

## 2021-10-15 NOTE — TELEPHONE ENCOUNTER
----- Message from Eamon Conley sent at 10/14/2021  5:28 PM EDT -----  Subject: Refill Request    QUESTIONS  Name of Medication? atorvastatin (LIPITOR) 40 MG tablet  Patient-reported dosage and instructions? one tablet daily   How many days do you have left? 0  Preferred Pharmacy? [facility]/PHARMACY #[id number]  Pharmacy phone number (if available)? [phone]  ---------------------------------------------------------------------------  --------------,  Name of Medication? Vitamin D (CHOLECALCIFEROL) 25 MCG (1000 UT) TABS   tablet  Patient-reported dosage and instructions? once a day  How many days do you have left? 0  Preferred Pharmacy? [facility]/PHARMACY #[id number]  Pharmacy phone number (if available)? [phone]  ---------------------------------------------------------------------------  --------------,  Name of Medication? amLODIPine (NORVASC) 10 MG tablet  Patient-reported dosage and instructions? once a day  How many days do you have left? 0  Preferred Pharmacy? [facility]/PHARMACY #[id number]  Pharmacy phone number (if available)? [phone]  ---------------------------------------------------------------------------  --------------,  Name of Medication? carvedilol (COREG) 3.125 MG tablet  Patient-reported dosage and instructions? tablet twice a day  How many days do you have left? 0  Preferred Pharmacy? [facility]Y #[id number]  Pharmacy phone number (if available)? [phone]  ---------------------------------------------------------------------------  --------------  Daryle Haver INFO  What is the best way for the office to contact you? OK to leave message on   voicemail  Preferred Call Back Phone Number?  [phone]

## 2021-10-18 ENCOUNTER — OFFICE VISIT (OUTPATIENT)
Dept: PRIMARY CARE CLINIC | Age: 86
End: 2021-10-18
Payer: MEDICARE

## 2021-10-18 VITALS
TEMPERATURE: 98.1 F | DIASTOLIC BLOOD PRESSURE: 80 MMHG | SYSTOLIC BLOOD PRESSURE: 132 MMHG | BODY MASS INDEX: 25.91 KG/M2 | OXYGEN SATURATION: 97 % | HEART RATE: 68 BPM | HEIGHT: 60 IN | RESPIRATION RATE: 16 BRPM | WEIGHT: 132 LBS

## 2021-10-18 DIAGNOSIS — E78.5 DYSLIPIDEMIA: ICD-10-CM

## 2021-10-18 DIAGNOSIS — L98.9 SKIN LESION: ICD-10-CM

## 2021-10-18 DIAGNOSIS — I25.10 CORONARY ARTERY DISEASE INVOLVING NATIVE HEART WITHOUT ANGINA PECTORIS, UNSPECIFIED VESSEL OR LESION TYPE: ICD-10-CM

## 2021-10-18 DIAGNOSIS — I10 ESSENTIAL HYPERTENSION: Primary | ICD-10-CM

## 2021-10-18 PROCEDURE — 99214 OFFICE O/P EST MOD 30 MIN: CPT | Performed by: FAMILY MEDICINE

## 2021-10-18 RX ORDER — ATORVASTATIN CALCIUM 40 MG/1
40 TABLET, FILM COATED ORAL DAILY
Qty: 30 TABLET | Refills: 1 | Status: CANCELLED | OUTPATIENT
Start: 2021-10-18

## 2021-10-18 RX ORDER — AMLODIPINE BESYLATE 10 MG/1
10 TABLET ORAL DAILY
Qty: 30 TABLET | Refills: 1 | Status: CANCELLED | OUTPATIENT
Start: 2021-10-18

## 2021-10-18 RX ORDER — VITAMIN B COMPLEX
2000 TABLET ORAL DAILY
Qty: 60 TABLET | Refills: 1 | Status: CANCELLED | OUTPATIENT
Start: 2021-10-18

## 2021-10-18 RX ORDER — CARVEDILOL 3.12 MG/1
3.12 TABLET ORAL 2 TIMES DAILY WITH MEALS
Qty: 60 TABLET | Refills: 1 | Status: SHIPPED | OUTPATIENT
Start: 2021-10-18 | End: 2022-01-06

## 2021-10-18 RX ORDER — DONEPEZIL HYDROCHLORIDE 10 MG/1
10 TABLET, ORALLY DISINTEGRATING ORAL NIGHTLY
Qty: 30 TABLET | Status: CANCELLED | OUTPATIENT
Start: 2021-10-18

## 2021-10-18 RX ORDER — VITAMIN B COMPLEX
2000 TABLET ORAL DAILY
Qty: 60 TABLET | Refills: 1 | Status: SHIPPED | OUTPATIENT
Start: 2021-10-18 | End: 2022-01-06

## 2021-10-18 RX ORDER — AMLODIPINE BESYLATE 10 MG/1
10 TABLET ORAL DAILY
Qty: 30 TABLET | Refills: 1 | Status: SHIPPED | OUTPATIENT
Start: 2021-10-18 | End: 2022-01-06

## 2021-10-18 RX ORDER — CARVEDILOL 3.12 MG/1
3.12 TABLET ORAL 2 TIMES DAILY WITH MEALS
Qty: 60 TABLET | Refills: 1 | Status: CANCELLED | OUTPATIENT
Start: 2021-10-18

## 2021-10-18 RX ORDER — ATORVASTATIN CALCIUM 40 MG/1
40 TABLET, FILM COATED ORAL DAILY
Qty: 30 TABLET | Refills: 1 | Status: SHIPPED | OUTPATIENT
Start: 2021-10-18 | End: 2022-01-06

## 2021-10-18 SDOH — ECONOMIC STABILITY: FOOD INSECURITY: WITHIN THE PAST 12 MONTHS, YOU WORRIED THAT YOUR FOOD WOULD RUN OUT BEFORE YOU GOT MONEY TO BUY MORE.: NEVER TRUE

## 2021-10-18 SDOH — ECONOMIC STABILITY: FOOD INSECURITY: WITHIN THE PAST 12 MONTHS, THE FOOD YOU BOUGHT JUST DIDN'T LAST AND YOU DIDN'T HAVE MONEY TO GET MORE.: NEVER TRUE

## 2021-10-18 ASSESSMENT — ENCOUNTER SYMPTOMS
ABDOMINAL PAIN: 0
VOMITING: 0
NAUSEA: 0
BLOOD IN STOOL: 0
SHORTNESS OF BREATH: 0
COUGH: 0
SORE THROAT: 0

## 2021-10-18 ASSESSMENT — SOCIAL DETERMINANTS OF HEALTH (SDOH): HOW HARD IS IT FOR YOU TO PAY FOR THE VERY BASICS LIKE FOOD, HOUSING, MEDICAL CARE, AND HEATING?: NOT HARD AT ALL

## 2021-10-18 NOTE — PATIENT INSTRUCTIONS
Patient Education      Go to the ER ASAP if you develop any chest pain, shortness of breath, facial droop, slurred speech, weakness/numbness in your arms or legs or double vision! DASH Diet: Care Instructions  Your Care Instructions     The DASH diet is an eating plan that can help lower your blood pressure. DASH stands for Dietary Approaches to Stop Hypertension. Hypertension is high blood pressure. The DASH diet focuses on eating foods that are high in calcium, potassium, and magnesium. These nutrients can lower blood pressure. The foods that are highest in these nutrients are fruits, vegetables, low-fat dairy products, nuts, seeds, and legumes. But taking calcium, potassium, and magnesium supplements instead of eating foods that are high in those nutrients does not have the same effect. The DASH diet also includes whole grains, fish, and poultry. The DASH diet is one of several lifestyle changes your doctor may recommend to lower your high blood pressure. Your doctor may also want you to decrease the amount of sodium in your diet. Lowering sodium while following the DASH diet can lower blood pressure even further than just the DASH diet alone. Follow-up care is a key part of your treatment and safety. Be sure to make and go to all appointments, and call your doctor if you are having problems. It's also a good idea to know your test results and keep a list of the medicines you take. How can you care for yourself at home? Following the DASH diet  · Eat 4 to 5 servings of fruit each day. A serving is 1 medium-sized piece of fruit, ½ cup chopped or canned fruit, 1/4 cup dried fruit, or 4 ounces (½ cup) of fruit juice. Choose fruit more often than fruit juice. · Eat 4 to 5 servings of vegetables each day. A serving is 1 cup of lettuce or raw leafy vegetables, ½ cup of chopped or cooked vegetables, or 4 ounces (½ cup) of vegetable juice. Choose vegetables more often than vegetable juice.   · Get 2 to 3 servings of low-fat and fat-free dairy each day. A serving is 8 ounces of milk, 1 cup of yogurt, or 1 ½ ounces of cheese. · Eat 6 to 8 servings of grains each day. A serving is 1 slice of bread, 1 ounce of dry cereal, or ½ cup of cooked rice, pasta, or cooked cereal. Try to choose whole-grain products as much as possible. · Limit lean meat, poultry, and fish to 2 servings each day. A serving is 3 ounces, about the size of a deck of cards. · Eat 4 to 5 servings of nuts, seeds, and legumes (cooked dried beans, lentils, and split peas) each week. A serving is 1/3 cup of nuts, 2 tablespoons of seeds, or ½ cup of cooked beans or peas. · Limit fats and oils to 2 to 3 servings each day. A serving is 1 teaspoon of vegetable oil or 2 tablespoons of salad dressing. · Limit sweets and added sugars to 5 servings or less a week. A serving is 1 tablespoon jelly or jam, ½ cup sorbet, or 1 cup of lemonade. · Eat less than 2,300 milligrams (mg) of sodium a day. If you limit your sodium to 1,500 mg a day, you can lower your blood pressure even more. · Be aware that all of these are the suggested number of servings for people who eat 1,800 to 2,000 calories a day. Your recommended number of servings may be different if you need more or fewer calories. Tips for success  · Start small. Do not try to make dramatic changes to your diet all at once. You might feel that you are missing out on your favorite foods and then be more likely to not follow the plan. Make small changes, and stick with them. Once those changes become habit, add a few more changes. · Try some of the following:  ? Make it a goal to eat a fruit or vegetable at every meal and at snacks. This will make it easy to get the recommended amount of fruits and vegetables each day. ? Try yogurt topped with fruit and nuts for a snack or healthy dessert. ? Add lettuce, tomato, cucumber, and onion to sandwiches.   ? Combine a ready-made pizza crust with low-fat mozzarella cheese and lots of vegetable toppings. Try using tomatoes, squash, spinach, broccoli, carrots, cauliflower, and onions. ? Have a variety of cut-up vegetables with a low-fat dip as an appetizer instead of chips and dip. ? Sprinkle sunflower seeds or chopped almonds over salads. Or try adding chopped walnuts or almonds to cooked vegetables. ? Try some vegetarian meals using beans and peas. Add garbanzo or kidney beans to salads. Make burritos and tacos with mashed machado beans or black beans. Where can you learn more? Go to https://[email or web address]. org and sign in to your [employer] account. Enter N037 in the [facility] box to learn more about \"DASH Diet: Care Instructions. \"     If you do not have an account, please click on the \"Sign Up Now\" link. Current as of: April 29, 2021               Content Version: 13.0  © 2006-2021 Healthwise, Incorporated. Care instructions adapted under license by [facility] ([facility]). If you have questions about a medical condition or this instruction, always ask your healthcare professional. [place] [address] any warranty or liability for your use of this information.

## 2021-10-18 NOTE — PROGRESS NOTES
Chief Complaint   Patient presents with    Hypertension     f/u visit - no new concerns         Dominique Alarcon is a 80 y.o. female who presents for followup visit regarding CAD, HTN, dyslipidemia and syncope. Patient did CXR which was normal. Pt has been eating a very healthy mediterranean diet      Pt did not do COVID vaccine yet     Pt has a hx of HTN, CAD and dyslipidemia and is on norvasc, Baby ASA, lipitor and coreg medications     Pt has a hx of Alzheimer's dementia and used to be on aricept medication in the past     Pt is a nonsmoker and denies alcohol use      Review of Systems   Constitutional: Negative for fatigue and fever. HENT: Negative for nosebleeds and sore throat. Eyes:        Negative blurred vision or diplopia   Respiratory: Negative for cough and shortness of breath. Cardiovascular: Negative for chest pain, palpitations and leg swelling. Gastrointestinal: Negative for abdominal pain, blood in stool, nausea and vomiting. Negative melena or indigestion   Endocrine: Negative for polydipsia and polyuria. Genitourinary: Negative for dysuria and hematuria. Musculoskeletal: Negative for arthralgias. Skin: Negative for rash. Neurological: Negative for dizziness, seizures, syncope, speech difficulty, weakness and headaches. Psychiatric/Behavioral: Negative for dysphoric mood. The patient is not nervous/anxious. Vitals:    10/18/21 1315   BP: 132/80   Pulse: 68   Resp: 16   Temp: 98.1 °F (36.7 °C)   SpO2: 97%         Physical Exam  Vitals reviewed. Constitutional:       General: She is not in acute distress. HENT:      Mouth/Throat:      Mouth: Mucous membranes are moist.      Pharynx: Oropharynx is clear. Eyes:      General: No scleral icterus. Comments: Pink conjunctivae    Neck:      Thyroid: No thyromegaly. Comments: No carotid bruits noted B/L  Cardiovascular:      Heart sounds: Normal heart sounds. No murmur heard. No friction rub. No gallop. Pulmonary:      Effort: Pulmonary effort is normal.      Breath sounds: Normal breath sounds. Abdominal:      Palpations: Abdomen is soft. Tenderness: There is no abdominal tenderness. Musculoskeletal:      Comments: No leg edema noted B/L   Lymphadenopathy:      Cervical: No cervical adenopathy. Skin:     Findings: No rash. Comments: Positive 2 x 2 cm black seborrheic keratosis lesion noted on L lateral thigh     Neurological:      Mental Status: She is alert. Comments: Cranial nerves 2 - 12 grossly intact; Muscle strength 5/5 throughout   Psychiatric:         Mood and Affect: Mood normal.         ASSESSMENT AND PLAN    1. Essential hypertension  Patient clinically stable on present medications and had recent normal CXR and is with resolution of her leg edema on PE     2. Coronary artery disease involving native heart without angina pectoris, unspecified vessel or lesion type  Patient clinically stable on present medications and denies any CP or SOB    3. Dyslipidemia  Patient clinically stable on present medications and is with a nonfocal neuro exam    4. Skin lesion on L thigh  Will Refer pt to Dermatology for excision  - External Referral To Dermatology         Tegan Roberson MD      Return in about 4 months (around 2/18/2022). Patient Instructions     Patient Education      Go to the ER ASAP if you develop any chest pain, shortness of breath, facial droop, slurred speech, weakness/numbness in your arms or legs or double vision! DASH Diet: Care Instructions  Your Care Instructions     The DASH diet is an eating plan that can help lower your blood pressure. DASH stands for Dietary Approaches to Stop Hypertension. Hypertension is high blood pressure. The DASH diet focuses on eating foods that are high in calcium, potassium, and magnesium. These nutrients can lower blood pressure.  The foods that are highest in these nutrients are fruits, vegetables, low-fat dairy products, nuts, seeds, and legumes. But taking calcium, potassium, and magnesium supplements instead of eating foods that are high in those nutrients does not have the same effect. The DASH diet also includes whole grains, fish, and poultry. The DASH diet is one of several lifestyle changes your doctor may recommend to lower your high blood pressure. Your doctor may also want you to decrease the amount of sodium in your diet. Lowering sodium while following the DASH diet can lower blood pressure even further than just the DASH diet alone. Follow-up care is a key part of your treatment and safety. Be sure to make and go to all appointments, and call your doctor if you are having problems. It's also a good idea to know your test results and keep a list of the medicines you take. How can you care for yourself at home? Following the DASH diet  · Eat 4 to 5 servings of fruit each day. A serving is 1 medium-sized piece of fruit, ½ cup chopped or canned fruit, 1/4 cup dried fruit, or 4 ounces (½ cup) of fruit juice. Choose fruit more often than fruit juice. · Eat 4 to 5 servings of vegetables each day. A serving is 1 cup of lettuce or raw leafy vegetables, ½ cup of chopped or cooked vegetables, or 4 ounces (½ cup) of vegetable juice. Choose vegetables more often than vegetable juice. · Get 2 to 3 servings of low-fat and fat-free dairy each day. A serving is 8 ounces of milk, 1 cup of yogurt, or 1 ½ ounces of cheese. · Eat 6 to 8 servings of grains each day. A serving is 1 slice of bread, 1 ounce of dry cereal, or ½ cup of cooked rice, pasta, or cooked cereal. Try to choose whole-grain products as much as possible. · Limit lean meat, poultry, and fish to 2 servings each day. A serving is 3 ounces, about the size of a deck of cards. · Eat 4 to 5 servings of nuts, seeds, and legumes (cooked dried beans, lentils, and split peas) each week.  A serving is 1/3 cup of nuts, 2 tablespoons of seeds, or ½ cup of cooked beans or peas.  · Limit fats and oils to 2 to 3 servings each day. A serving is 1 teaspoon of vegetable oil or 2 tablespoons of salad dressing. · Limit sweets and added sugars to 5 servings or less a week. A serving is 1 tablespoon jelly or jam, ½ cup sorbet, or 1 cup of lemonade. · Eat less than 2,300 milligrams (mg) of sodium a day. If you limit your sodium to 1,500 mg a day, you can lower your blood pressure even more. · Be aware that all of these are the suggested number of servings for people who eat 1,800 to 2,000 calories a day. Your recommended number of servings may be different if you need more or fewer calories. Tips for success  · Start small. Do not try to make dramatic changes to your diet all at once. You might feel that you are missing out on your favorite foods and then be more likely to not follow the plan. Make small changes, and stick with them. Once those changes become habit, add a few more changes. · Try some of the following:  ? Make it a goal to eat a fruit or vegetable at every meal and at snacks. This will make it easy to get the recommended amount of fruits and vegetables each day. ? Try yogurt topped with fruit and nuts for a snack or healthy dessert. ? Add lettuce, tomato, cucumber, and onion to sandwiches. ? Combine a ready-made pizza crust with low-fat mozzarella cheese and lots of vegetable toppings. Try using tomatoes, squash, spinach, broccoli, carrots, cauliflower, and onions. ? Have a variety of cut-up vegetables with a low-fat dip as an appetizer instead of chips and dip. ? Sprinkle sunflower seeds or chopped almonds over salads. Or try adding chopped walnuts or almonds to cooked vegetables. ? Try some vegetarian meals using beans and peas. Add garbanzo or kidney beans to salads. Make burritos and tacos with mashed machado beans or black beans. Where can you learn more? Go to https://gino.Seeder. org and sign in to your [employer] account.  Enter T926 in the Search Health Information box to learn more about \"DASH Diet: Care Instructions. \"     If you do not have an account, please click on the \"Sign Up Now\" link. Current as of: April 29, 2021               Content Version: 13.0  © 3529-3689 Healthwise, Incorporated. Care instructions adapted under license by [facility] [place] ([facility]). If you have questions about a medical condition or this instruction, always ask your healthcare professional. [address] any warranty or liability for your use of this information.

## 2021-11-17 ENCOUNTER — TELEPHONE (OUTPATIENT)
Dept: PRIMARY CARE CLINIC | Age: 86
End: 2021-11-17

## 2021-11-17 ENCOUNTER — APPOINTMENT (OUTPATIENT)
Dept: GENERAL RADIOLOGY | Age: 86
DRG: 057 | End: 2021-11-17
Payer: MEDICARE

## 2021-11-17 ENCOUNTER — HOSPITAL ENCOUNTER (INPATIENT)
Age: 86
LOS: 2 days | Discharge: HOME | DRG: 057 | End: 2021-11-19
Attending: EMERGENCY MEDICINE | Admitting: INTERNAL MEDICINE
Payer: MEDICARE

## 2021-11-17 ENCOUNTER — APPOINTMENT (OUTPATIENT)
Dept: CT IMAGING | Age: 86
DRG: 057 | End: 2021-11-17
Payer: MEDICARE

## 2021-11-17 DIAGNOSIS — I63.9 CEREBROVASCULAR ACCIDENT (CVA), UNSPECIFIED MECHANISM (HCC): Primary | ICD-10-CM

## 2021-11-17 LAB
A/G RATIO: 0.9 (ref 1.1–2.2)
ABO/RH: NORMAL
ALBUMIN SERPL-MCNC: 3.7 G/DL (ref 3.4–5)
ALP BLD-CCNC: 142 U/L (ref 40–129)
ALT SERPL-CCNC: 8 U/L (ref 10–40)
AMYLASE: 61 U/L (ref 25–115)
ANION GAP SERPL CALCULATED.3IONS-SCNC: 12 MMOL/L (ref 3–16)
ANTIBODY SCREEN: NORMAL
APTT: 33.4 SEC (ref 26.2–38.6)
AST SERPL-CCNC: 17 U/L (ref 15–37)
BASE EXCESS VENOUS: 1.6 MMOL/L (ref -3–3)
BASOPHILS ABSOLUTE: 0.1 K/UL (ref 0–0.2)
BASOPHILS RELATIVE PERCENT: 0.7 %
BILIRUB SERPL-MCNC: 0.8 MG/DL (ref 0–1)
BUN BLDV-MCNC: 15 MG/DL (ref 7–20)
C-REACTIVE PROTEIN: 59.7 MG/L (ref 0–5.1)
CALCIUM SERPL-MCNC: 9.6 MG/DL (ref 8.3–10.6)
CARBOXYHEMOGLOBIN: 4.5 % (ref 0–1.5)
CHLORIDE BLD-SCNC: 104 MMOL/L (ref 99–110)
CO2: 24 MMOL/L (ref 21–32)
CREAT SERPL-MCNC: 0.9 MG/DL (ref 0.6–1.2)
D DIMER: 531 NG/ML DDU (ref 0–229)
EOSINOPHILS ABSOLUTE: 0 K/UL (ref 0–0.6)
EOSINOPHILS RELATIVE PERCENT: 0.4 %
GFR AFRICAN AMERICAN: >60
GFR NON-AFRICAN AMERICAN: 59
GLUCOSE BLD-MCNC: 120 MG/DL (ref 70–99)
GLUCOSE BLD-MCNC: 185 MG/DL (ref 70–99)
GLUCOSE BLD-MCNC: 93 MG/DL (ref 70–99)
HCO3 VENOUS: 26.3 MMOL/L (ref 23–29)
HCT VFR BLD CALC: 41.3 % (ref 36–48)
HEMOGLOBIN, VEN, REDUCED: 16 %
HEMOGLOBIN: 13.7 G/DL (ref 12–16)
INR BLD: 1.12 (ref 0.88–1.12)
LIPASE: 15 U/L (ref 13–60)
LYMPHOCYTES ABSOLUTE: 1.1 K/UL (ref 1–5.1)
LYMPHOCYTES RELATIVE PERCENT: 12.2 %
MCH RBC QN AUTO: 30.9 PG (ref 26–34)
MCHC RBC AUTO-ENTMCNC: 33.1 G/DL (ref 31–36)
MCV RBC AUTO: 93.2 FL (ref 80–100)
METHEMOGLOBIN VENOUS: 0 %
MONOCYTES ABSOLUTE: 1 K/UL (ref 0–1.3)
MONOCYTES RELATIVE PERCENT: 11.5 %
NEUTROPHILS ABSOLUTE: 6.7 K/UL (ref 1.7–7.7)
NEUTROPHILS RELATIVE PERCENT: 75.2 %
O2 CONTENT, VEN: 16 VOL %
O2 SAT, VEN: 83 %
O2 THERAPY: ABNORMAL
PCO2, VEN: 40.8 MMHG (ref 40–50)
PDW BLD-RTO: 15.4 % (ref 12.4–15.4)
PERFORMED ON: ABNORMAL
PERFORMED ON: ABNORMAL
PH VENOUS: 7.42 (ref 7.35–7.45)
PLATELET # BLD: 167 K/UL (ref 135–450)
PMV BLD AUTO: 9.2 FL (ref 5–10.5)
PO2, VEN: 45.2 MMHG (ref 25–40)
POTASSIUM REFLEX MAGNESIUM: 3.7 MMOL/L (ref 3.5–5.1)
PRO-BNP: 1540 PG/ML (ref 0–449)
PROTHROMBIN TIME: 12.7 SEC (ref 9.9–12.7)
RBC # BLD: 4.43 M/UL (ref 4–5.2)
SEDIMENTATION RATE, ERYTHROCYTE: 72 MM/HR (ref 0–30)
SODIUM BLD-SCNC: 140 MMOL/L (ref 136–145)
TCO2 CALC VENOUS: 62 MMOL/L
TOTAL CK: 91 U/L (ref 26–192)
TOTAL PROTEIN: 8 G/DL (ref 6.4–8.2)
TROPONIN: <0.01 NG/ML
TSH SERPL DL<=0.05 MIU/L-ACNC: 2.25 UIU/ML (ref 0.27–4.2)
WBC # BLD: 8.9 K/UL (ref 4–11)

## 2021-11-17 PROCEDURE — 6370000000 HC RX 637 (ALT 250 FOR IP): Performed by: EMERGENCY MEDICINE

## 2021-11-17 PROCEDURE — 73120 X-RAY EXAM OF HAND: CPT

## 2021-11-17 PROCEDURE — 85652 RBC SED RATE AUTOMATED: CPT

## 2021-11-17 PROCEDURE — 70450 CT HEAD/BRAIN W/O DYE: CPT

## 2021-11-17 PROCEDURE — 86900 BLOOD TYPING SEROLOGIC ABO: CPT

## 2021-11-17 PROCEDURE — 82803 BLOOD GASES ANY COMBINATION: CPT

## 2021-11-17 PROCEDURE — 82550 ASSAY OF CK (CPK): CPT

## 2021-11-17 PROCEDURE — 93005 ELECTROCARDIOGRAM TRACING: CPT | Performed by: EMERGENCY MEDICINE

## 2021-11-17 PROCEDURE — 82150 ASSAY OF AMYLASE: CPT

## 2021-11-17 PROCEDURE — 73100 X-RAY EXAM OF WRIST: CPT

## 2021-11-17 PROCEDURE — 83880 ASSAY OF NATRIURETIC PEPTIDE: CPT

## 2021-11-17 PROCEDURE — 85379 FIBRIN DEGRADATION QUANT: CPT

## 2021-11-17 PROCEDURE — 86850 RBC ANTIBODY SCREEN: CPT

## 2021-11-17 PROCEDURE — 70496 CT ANGIOGRAPHY HEAD: CPT

## 2021-11-17 PROCEDURE — 6370000000 HC RX 637 (ALT 250 FOR IP): Performed by: INTERNAL MEDICINE

## 2021-11-17 PROCEDURE — 73030 X-RAY EXAM OF SHOULDER: CPT

## 2021-11-17 PROCEDURE — 84443 ASSAY THYROID STIM HORMONE: CPT

## 2021-11-17 PROCEDURE — 36415 COLL VENOUS BLD VENIPUNCTURE: CPT

## 2021-11-17 PROCEDURE — 80053 COMPREHEN METABOLIC PANEL: CPT

## 2021-11-17 PROCEDURE — 83036 HEMOGLOBIN GLYCOSYLATED A1C: CPT

## 2021-11-17 PROCEDURE — 99285 EMERGENCY DEPT VISIT HI MDM: CPT

## 2021-11-17 PROCEDURE — 71045 X-RAY EXAM CHEST 1 VIEW: CPT

## 2021-11-17 PROCEDURE — 86901 BLOOD TYPING SEROLOGIC RH(D): CPT

## 2021-11-17 PROCEDURE — 73070 X-RAY EXAM OF ELBOW: CPT

## 2021-11-17 PROCEDURE — 6360000002 HC RX W HCPCS: Performed by: INTERNAL MEDICINE

## 2021-11-17 PROCEDURE — 85025 COMPLETE CBC W/AUTO DIFF WBC: CPT

## 2021-11-17 PROCEDURE — 84484 ASSAY OF TROPONIN QUANT: CPT

## 2021-11-17 PROCEDURE — 6360000004 HC RX CONTRAST MEDICATION: Performed by: EMERGENCY MEDICINE

## 2021-11-17 PROCEDURE — 2060000000 HC ICU INTERMEDIATE R&B

## 2021-11-17 PROCEDURE — 86140 C-REACTIVE PROTEIN: CPT

## 2021-11-17 PROCEDURE — 85730 THROMBOPLASTIN TIME PARTIAL: CPT

## 2021-11-17 PROCEDURE — 4A03X5D MEASUREMENT OF ARTERIAL FLOW, INTRACRANIAL, EXTERNAL APPROACH: ICD-10-PCS | Performed by: INTERNAL MEDICINE

## 2021-11-17 PROCEDURE — 83690 ASSAY OF LIPASE: CPT

## 2021-11-17 PROCEDURE — 85610 PROTHROMBIN TIME: CPT

## 2021-11-17 RX ORDER — AMLODIPINE BESYLATE 5 MG/1
10 TABLET ORAL DAILY
Status: DISCONTINUED | OUTPATIENT
Start: 2021-11-18 | End: 2021-11-19 | Stop reason: HOSPADM

## 2021-11-17 RX ORDER — ONDANSETRON 4 MG/1
4 TABLET, ORALLY DISINTEGRATING ORAL EVERY 8 HOURS PRN
Status: DISCONTINUED | OUTPATIENT
Start: 2021-11-17 | End: 2021-11-19 | Stop reason: HOSPADM

## 2021-11-17 RX ORDER — CARVEDILOL 3.12 MG/1
3.12 TABLET ORAL 2 TIMES DAILY WITH MEALS
Status: DISCONTINUED | OUTPATIENT
Start: 2021-11-17 | End: 2021-11-19 | Stop reason: HOSPADM

## 2021-11-17 RX ORDER — ACETAMINOPHEN 325 MG/1
650 TABLET ORAL ONCE
Status: COMPLETED | OUTPATIENT
Start: 2021-11-17 | End: 2021-11-17

## 2021-11-17 RX ORDER — ASPIRIN 300 MG/1
300 SUPPOSITORY RECTAL DAILY
Status: DISCONTINUED | OUTPATIENT
Start: 2021-11-17 | End: 2021-11-19 | Stop reason: HOSPADM

## 2021-11-17 RX ORDER — ASPIRIN 81 MG/1
81 TABLET ORAL DAILY
Status: DISCONTINUED | OUTPATIENT
Start: 2021-11-17 | End: 2021-11-19 | Stop reason: HOSPADM

## 2021-11-17 RX ORDER — ATORVASTATIN CALCIUM 40 MG/1
40 TABLET, FILM COATED ORAL DAILY
Status: DISCONTINUED | OUTPATIENT
Start: 2021-11-18 | End: 2021-11-19 | Stop reason: HOSPADM

## 2021-11-17 RX ORDER — POLYETHYLENE GLYCOL 3350 17 G/17G
17 POWDER, FOR SOLUTION ORAL DAILY PRN
Status: DISCONTINUED | OUTPATIENT
Start: 2021-11-17 | End: 2021-11-19 | Stop reason: HOSPADM

## 2021-11-17 RX ORDER — ONDANSETRON 2 MG/ML
4 INJECTION INTRAMUSCULAR; INTRAVENOUS EVERY 6 HOURS PRN
Status: DISCONTINUED | OUTPATIENT
Start: 2021-11-17 | End: 2021-11-19 | Stop reason: HOSPADM

## 2021-11-17 RX ORDER — LABETALOL HYDROCHLORIDE 5 MG/ML
10 INJECTION, SOLUTION INTRAVENOUS EVERY 10 MIN PRN
Status: DISCONTINUED | OUTPATIENT
Start: 2021-11-17 | End: 2021-11-19 | Stop reason: HOSPADM

## 2021-11-17 RX ADMIN — ACETAMINOPHEN 650 MG: 325 TABLET ORAL at 16:56

## 2021-11-17 RX ADMIN — CARVEDILOL 3.12 MG: 3.12 TABLET, FILM COATED ORAL at 22:19

## 2021-11-17 RX ADMIN — ASPIRIN 81 MG: 81 TABLET, COATED ORAL at 22:19

## 2021-11-17 RX ADMIN — ENOXAPARIN SODIUM 40 MG: 40 INJECTION SUBCUTANEOUS at 22:19

## 2021-11-17 RX ADMIN — IOPAMIDOL 75 ML: 755 INJECTION, SOLUTION INTRAVENOUS at 16:34

## 2021-11-17 ASSESSMENT — PAIN DESCRIPTION - ORIENTATION
ORIENTATION: LEFT
ORIENTATION: LEFT

## 2021-11-17 ASSESSMENT — PAIN DESCRIPTION - LOCATION
LOCATION: HAND
LOCATION: HAND;WRIST

## 2021-11-17 ASSESSMENT — PAIN DESCRIPTION - FREQUENCY: FREQUENCY: INTERMITTENT

## 2021-11-17 ASSESSMENT — PAIN SCALES - GENERAL
PAINLEVEL_OUTOF10: 10
PAINLEVEL_OUTOF10: 0

## 2021-11-17 ASSESSMENT — PAIN DESCRIPTION - PAIN TYPE
TYPE: ACUTE PAIN
TYPE: ACUTE PAIN

## 2021-11-17 NOTE — ED PROVIDER NOTES
[facility]        Pt Name: Mirela Bay  MRN: [id number]  Birthdate 11/8/1930  Date of evaluation: 11/17/2021  Provider: Robyn Carmichael MD  PCP: Vinh Choe MD    This patient was seen and evaluated by the attending physician Robyn Carmichael MD.      [facility]       Chief Complaint   Patient presents with    Extremity Weakness     Pt to ER from home for complaint of L sided swelling in hand, pain, and weakness. Pt also has been \"slowed down\" fatigued per pts family. Pt with hx of dementia, but able to answer questions at baseline.  Altered Mental Status       HISTORY OF PRESENT ILLNESS   (Location/Symptom, Timing/Onset, Context/Setting, Quality, Duration, Modifying Factors, Severity)  Note limiting factors. Mirela Bay is a 80 y.o. female brought in today by her grandson whom provides much of the history and is much the caretaking at home (and whom as an aside I must complement for doing an excellent job caring for his grandmother) with what he noticed initially his left hand swelling then progressed to left arm and left leg weakness beginning sometime this morning getting gradually noticeable throughout the day. No prior CVAs TIAs. No history of A. fib. Grandson works with grandma at home fairly extensively doing her daily physical therapy and noticed that this morning her left arm and left leg were drifting compared to her right. No tingling no paresthesias no numbness. Nursing Notes were all reviewed and agreed with or any disagreements were addressed  in the HPI. REVIEW OF SYSTEMS    (2-9 systems for level 4, 10 or more for level 5)     Review of Systems    Positives and Pertinent negatives as per HPI. Except as noted abovein the ROS, all other systems were reviewed and negative. PAST MEDICAL HISTORY   History reviewed. No pertinent past medical history.       SURGICAL HISTORY   History reviewed. No pertinent surgical history. CURRENTMEDICATIONS       Previous Medications    AMLODIPINE (NORVASC) 10 MG TABLET    Take 1 tablet by mouth daily    ATORVASTATIN (LIPITOR) 40 MG TABLET    Take 1 tablet by mouth daily    CARVEDILOL (COREG) 3.125 MG TABLET    Take 1 tablet by mouth 2 times daily (with meals)    DONEPEZIL (ARICEPT ODT) 10 MG DISINTEGRATING TABLET    Take 10 mg by mouth nightly    VITAMIN D (CHOLECALCIFEROL) 25 MCG (1000 UT) TABS TABLET    Take 2 tablets by mouth daily         ALLERGIES     Prochlorperazine    FAMILYHISTORY     History reviewed. No pertinent family history. SOCIAL HISTORY       Social History     Socioeconomic History    Marital status:      Spouse name: None    Number of children: None    Years of education: None    Highest education level: None   Occupational History    None   Tobacco Use    Smoking status: Never Smoker    Smokeless tobacco: Never Used   Vaping Use    Vaping Use: Never used   Substance and Sexual Activity    Alcohol use: Not Currently    Drug use: Never    Sexual activity: None   Other Topics Concern    None   Social History Narrative    None     Social Determinants of Health     Financial Resource Strain: Low Risk     Difficulty of Paying Living Expenses: Not hard at all   Food Insecurity: No Food Insecurity    Worried About Running Out of Food in the Last Year: Never true    Haely of Food in the Last Year: Never true   Transportation Needs:     Lack of Transportation (Medical): Not on file    Lack of Transportation (Non-Medical):  Not on file   Physical Activity:     Days of Exercise per Week: Not on file    Minutes of Exercise per Session: Not on file   Stress:     Feeling of Stress : Not on file   Social Connections:     Frequency of Communication with Friends and Family: Not on file    Frequency of Social Gatherings with Friends and Family: Not on file    Attends [employer] Services: Not on file   [facility] Active Member of Clubs or Organizations: Not on file    Attends Club or Organization Meetings: Not on file    Marital Status: Not on file   Intimate Partner Violence:     Fear of Current or Ex-Partner: Not on file    Emotionally Abused: Not on file    Physically Abused: Not on file    Sexually Abused: Not on file   Housing Stability:     Unable to Pay for Housing in the Last Year: Not on file    Number of [place] in the Last Year: Not on file    Unstable Housing in the Last Year: Not on file       SCREENINGS    [place] Coma Scale  Eye Opening: Spontaneous  Best Verbal Response: Confused  Best Motor Response: Obeys commands  [place] Com[place] Scale Score: 14        PHYSICAL EXAM    (up to 7 for level 4, 8 or more for level 5)     ED Triage Vitals   BP Temp Temp src Pulse Resp SpO2 Height Weight   -- -- -- -- -- -- -- --       Physical Exam     General Appearance:  Alert, cooperative, no distress, appears stated age. Head:  Normocephalic, without obvious abnormality, atraumatic. Eyes:  conjunctiva/corneas clear, EOM's intact. Sclera anicteric. ENT: Mucous membranes moist.   Neck: Supple, symmetrical, trachea midline, no adenopathy. No jugular venous distention. Lungs:   No Respiratory Distress. Chest Wall:  Atraumatic   Heart:  RRR   Abdomen:   Soft, NT, ND   Extremities:  Full range of motion. Left side is weaker compared to right. Left hand swollen and tender. Pulses: Symmetric x4   Skin:  No rashes or lesions to exposed skin. Neurologic: Alert and oriented X 3. Motor grossly normal.  Speech clear. Reflexes are intact extremities. She has drift in her left leg and left arm compared to right. Left hand is swollen as well and tender to touch.          DIAGNOSTIC RESULTS   LABS:    Labs Reviewed   COMPREHENSIVE METABOLIC PANEL W/ REFLEX TO MG FOR LOW K - Abnormal; Notable for the following components:       Result Value    GFR Non- 59 (*)     Albumin/Globulin Ratio 0.9 (*) Alkaline Phosphatase 142 (*)     ALT 8 (*)     All other components within normal limits    Narrative:     Performed at:  [facility] [address],  [facility], [facility] [address]   Phone [phone]   BRAIN NATRIURETIC PEPTIDE - Abnormal; Notable for the following components:    Pro-BNP 1,540 (*)     All other components within normal limits    Narrative:     Performed at:  [facility] [address],  [facility], [facility] [place] [employer]   Phone [phone]   D-DIMER, QUANTITATIVE - Abnormal; Notable for the following components:    D-Dimer, Quant 531 (*)     All other components within normal limits    Narrative:     Performed at:  [facility] [address],  [facility], [facility] [address]   Phone [phone]   SEDIMENTATION RATE - Abnormal; Notable for the following components:    Sed Rate 72 (*)     All other components within normal limits    Narrative:     Performed at:  [facility] [address], [facility], [facility]   Phone [phone]   C-REACTIVE PROTEIN - Abnormal; Notable for the following components:    CRP 59.7 (*)     All other components within normal limits    Narrative:     Performed at:  [facility] [address],  [facility], [facility]   Phone [phone]   BLOOD GAS, VENOUS - Abnormal; Notable for the following components:    pO2, Fred 45.2 (*)     Carboxyhemoglobin 4.5 (*)     All other components within normal limits    Narrative:     Performed at:  [facility] [address],  [facility], [facility] [address]   Phone [phone]   POCT GLUCOSE - Abnormal; Notable for the following components:    POC Glucose 120 (*)     All other components within normal limits    Narrative:     Performed at:  [facility] [address],  [facility], [facility] [address]   Phone [phone]   CULTURE, URINE   CBC findings:        Interpretation [facility] Radiologist below, if available at the time of this note:    XR CHEST PORTABLE   Final Result   No acute cardiopulmonary abnormality. CTA HEAD NECK W CONTRAST   Final Result   1. No significant stenosis or occlusion of the cervical vasculature. 2. No large vessel occlusion intracranially. CT HEAD WO CONTRAST   Final Result   No acute intracranial abnormality. Atrophy and microvascular ischemic disease in the cerebral white matter. VL Extremity Venous Left    (Results Pending)     No results found. PROCEDURES   Unless otherwise noted below, none     Procedures    CRITICAL CARE TIME   N/A    CONSULTS:  IP CONSULT TO PHARMACY  PHARMACY TO CHANGE BASE FLUIDS      EMERGENCY DEPARTMENT COURSE and DIFFERENTIAL DIAGNOSIS/MDM:   Vitals:    Vitals:    11/17/21 1608   BP: (!) 149/98   Pulse: 98   Resp: 14   Temp: 98.6 °F (37 °C)   TempSrc: Oral   SpO2: 96%       Patient was given thefollowing medications:  Medications   acetaminophen (TYLENOL) tablet 650 mg (650 mg Oral Given 11/17/21 1656)   iopamidol (ISOVUE-370) 76 % injection 75 mL (75 mLs IntraVENous Given 11/17/21 1634)       19-year-old female here as a stroke alert. Last known well sometime this morning. Total scale of 2 1 for left arm 1 for left leg. Not a TPA candidate. Discussed with stroke team who concurs. In for usual stroke work-up. I also obtaining ultrasound Doppler of the left upper extremity DVT and a CPK to rule out mild rhabdomyolysis. Sono Pending; CK 91. CT benign, no hemorrhage. Will admit for stroke. 45min critical care time  Indication and intervention as above. FINAL IMPRESSION      1. Cerebrovascular accident (CVA), unspecified mechanism ([address]          DISPOSITION/PLAN   DISPOSITION        PATIENT REFERREDTO:  No follow-up provider specified.     DISCHARGE MEDICATIONS:  New Prescriptions    No medications on file       DISCONTINUED MEDICATIONS:  Discontinued Medications    No medications on file              (Please note that portions ofthis note were completed with a voice recognition program.  Efforts were made to edit the dictations but occasionally words are mis-transcribed.)    Eleno Phalen, MD (electronically signed)           Eleno Phalen, MD  11/17/21 8006

## 2021-11-17 NOTE — ED NOTES
Bed: 15  Expected date:   Expected time:   Means of arrival:   Comments:  Elvi Stark RN  11/17/21 7452

## 2021-11-18 ENCOUNTER — APPOINTMENT (OUTPATIENT)
Dept: MRI IMAGING | Age: 86
DRG: 057 | End: 2021-11-18
Payer: MEDICARE

## 2021-11-18 LAB
BILIRUBIN URINE: NEGATIVE
BLOOD, URINE: NEGATIVE
CHOLESTEROL, TOTAL: 117 MG/DL (ref 0–199)
CLARITY: CLEAR
COLOR: YELLOW
EKG ATRIAL RATE: 68 BPM
EKG DIAGNOSIS: NORMAL
EKG P AXIS: 6 DEGREES
EKG P-R INTERVAL: 140 MS
EKG Q-T INTERVAL: 398 MS
EKG QRS DURATION: 62 MS
EKG QTC CALCULATION (BAZETT): 423 MS
EKG R AXIS: -41 DEGREES
EKG T AXIS: 22 DEGREES
EKG VENTRICULAR RATE: 68 BPM
ESTIMATED AVERAGE GLUCOSE: 111.2 MG/DL
ESTIMATED AVERAGE GLUCOSE: 111.2 MG/DL
FOLATE: >20 NG/ML (ref 4.78–24.2)
GLUCOSE URINE: NEGATIVE MG/DL
HBA1C MFR BLD: 5.5 %
HBA1C MFR BLD: 5.5 %
HCT VFR BLD CALC: 36.4 % (ref 36–48)
HDLC SERPL-MCNC: 45 MG/DL (ref 40–60)
HEMOGLOBIN: 12.3 G/DL (ref 12–16)
KETONES, URINE: NEGATIVE MG/DL
LDL CHOLESTEROL CALCULATED: 61 MG/DL
LEUKOCYTE ESTERASE, URINE: NEGATIVE
MCH RBC QN AUTO: 31.2 PG (ref 26–34)
MCHC RBC AUTO-ENTMCNC: 33.9 G/DL (ref 31–36)
MCV RBC AUTO: 92.1 FL (ref 80–100)
MICROSCOPIC EXAMINATION: NORMAL
NITRITE, URINE: NEGATIVE
PDW BLD-RTO: 15 % (ref 12.4–15.4)
PH UA: 6 (ref 5–8)
PLATELET # BLD: 145 K/UL (ref 135–450)
PMV BLD AUTO: 9.5 FL (ref 5–10.5)
PROTEIN UA: NEGATIVE MG/DL
RBC # BLD: 3.95 M/UL (ref 4–5.2)
SPECIFIC GRAVITY UA: 1.03 (ref 1–1.03)
TRIGL SERPL-MCNC: 56 MG/DL (ref 0–150)
URINE TYPE: NORMAL
UROBILINOGEN, URINE: 0.2 E.U./DL
VITAMIN B-12: 993 PG/ML (ref 211–911)
VLDLC SERPL CALC-MCNC: 11 MG/DL
WBC # BLD: 8.7 K/UL (ref 4–11)

## 2021-11-18 PROCEDURE — 97530 THERAPEUTIC ACTIVITIES: CPT

## 2021-11-18 PROCEDURE — 99223 1ST HOSP IP/OBS HIGH 75: CPT | Performed by: PSYCHIATRY & NEUROLOGY

## 2021-11-18 PROCEDURE — 6360000002 HC RX W HCPCS: Performed by: INTERNAL MEDICINE

## 2021-11-18 PROCEDURE — 87086 URINE CULTURE/COLONY COUNT: CPT

## 2021-11-18 PROCEDURE — 92526 ORAL FUNCTION THERAPY: CPT

## 2021-11-18 PROCEDURE — 97535 SELF CARE MNGMENT TRAINING: CPT

## 2021-11-18 PROCEDURE — 81003 URINALYSIS AUTO W/O SCOPE: CPT

## 2021-11-18 PROCEDURE — 85027 COMPLETE CBC AUTOMATED: CPT

## 2021-11-18 PROCEDURE — 2060000000 HC ICU INTERMEDIATE R&B

## 2021-11-18 PROCEDURE — 82607 VITAMIN B-12: CPT

## 2021-11-18 PROCEDURE — 97165 OT EVAL LOW COMPLEX 30 MIN: CPT

## 2021-11-18 PROCEDURE — 70551 MRI BRAIN STEM W/O DYE: CPT

## 2021-11-18 PROCEDURE — 6370000000 HC RX 637 (ALT 250 FOR IP): Performed by: INTERNAL MEDICINE

## 2021-11-18 PROCEDURE — 82746 ASSAY OF FOLIC ACID SERUM: CPT

## 2021-11-18 PROCEDURE — 92610 EVALUATE SWALLOWING FUNCTION: CPT

## 2021-11-18 PROCEDURE — 97162 PT EVAL MOD COMPLEX 30 MIN: CPT

## 2021-11-18 PROCEDURE — 36415 COLL VENOUS BLD VENIPUNCTURE: CPT

## 2021-11-18 PROCEDURE — 80061 LIPID PANEL: CPT

## 2021-11-18 PROCEDURE — 83036 HEMOGLOBIN GLYCOSYLATED A1C: CPT

## 2021-11-18 PROCEDURE — 93010 ELECTROCARDIOGRAM REPORT: CPT | Performed by: INTERNAL MEDICINE

## 2021-11-18 PROCEDURE — 93971 EXTREMITY STUDY: CPT

## 2021-11-18 PROCEDURE — 99222 1ST HOSP IP/OBS MODERATE 55: CPT | Performed by: ORTHOPAEDIC SURGERY

## 2021-11-18 PROCEDURE — 97116 GAIT TRAINING THERAPY: CPT

## 2021-11-18 RX ORDER — ACETAMINOPHEN 325 MG/1
650 TABLET ORAL EVERY 6 HOURS PRN
Status: DISCONTINUED | OUTPATIENT
Start: 2021-11-18 | End: 2021-11-19 | Stop reason: HOSPADM

## 2021-11-18 RX ADMIN — CARVEDILOL 3.12 MG: 3.12 TABLET, FILM COATED ORAL at 10:08

## 2021-11-18 RX ADMIN — CARVEDILOL 3.12 MG: 3.12 TABLET, FILM COATED ORAL at 16:59

## 2021-11-18 RX ADMIN — ENOXAPARIN SODIUM 40 MG: 40 INJECTION SUBCUTANEOUS at 10:07

## 2021-11-18 RX ADMIN — AMLODIPINE BESYLATE 10 MG: 5 TABLET ORAL at 10:08

## 2021-11-18 RX ADMIN — ASPIRIN 81 MG: 81 TABLET, COATED ORAL at 10:08

## 2021-11-18 RX ADMIN — ATORVASTATIN CALCIUM 40 MG: 40 TABLET, FILM COATED ORAL at 20:52

## 2021-11-18 ASSESSMENT — PAIN SCALES - GENERAL
PAINLEVEL_OUTOF10: 0

## 2021-11-18 ASSESSMENT — PAIN DESCRIPTION - PAIN TYPE: TYPE: ACUTE PAIN

## 2021-11-18 ASSESSMENT — PAIN DESCRIPTION - ORIENTATION: ORIENTATION: LEFT

## 2021-11-18 ASSESSMENT — PAIN DESCRIPTION - LOCATION: LOCATION: HAND

## 2021-11-18 NOTE — CONSULTS
In patient Neurology consult        [facility] Neurology      MD Zoe Sorenson  11/8/1930    Date of Service: 11/18/2021    Referring Physician: Princess Hernandez MD      Reason for the consult and CC: Acute left-sided weakness  History is obtained from chart review and discussion with the patient's grandson. HPI:   The patient is a 80y.o.  years old female with history of hypertension, dementia and hyperlipidemia who was admitted to the hospital yesterday with acute left-sided weakness. Symptoms started 2 to 3 days ago. She lives with her grandson under 24-7 supervision. At baseline, she can walk without significant assistant. Her grandson noticed over the weekend, increased weakness on the left side mainly left arm and leg. She was not able to walk steadily. He also noticed some left hand swelling. Degree was severe. Duration was persistent. No falling, injury, headache, chest pain or dysphagia. No speech impairment. No other relieving or aggravating factors. He decided to bring her to the ER yesterday for evaluation. Initial work-up with CT showed no acute stroke or large vessel occlusion. She was admitted. Today she is awake and alert. Unable to give any more history.     Family history: non Contributory    Surgical history: Noncontributory    Medical history:  Hypertension  Hyperlipidemia  CAD  Dementia      Social History     Tobacco Use    Smoking status: Never Smoker    Smokeless tobacco: Never Used   Vaping Use    Vaping Use: Never used   Substance Use Topics    Alcohol use: Not Currently    Drug use: Never     Allergies   Allergen Reactions    Prochlorperazine Other (See Comments)     Current Facility-Administered Medications   Medication Dose Route Frequency Provider Last Rate Last Admin    amLODIPine (NORVASC) tablet 10 mg  10 mg Oral Daily Roberto Carlos HAMILTON MD   10 mg at 11/18/21 1008    atorvastatin (LIPITOR) tablet 40 mg  40 mg Oral Daily Roberto Carlos Richard Sheehan MD        carvedilol (COREG) tablet 3.125 mg  3.125 mg Oral BID WC Roberto Carlos HAMILTNO MD   3.125 mg at 11/18/21 1008    ondansetron (ZOFRAN-ODT) disintegrating tablet 4 mg  4 mg Oral Q8H PRN Roberto Carlos Sheehan MD        Or    ondansetron (ZOFRAN) injection 4 mg  4 mg IntraVENous Q6H PRN Roberto Carlos Sheehan MD        polyethylene glycol (GLYCOLAX) packet 17 g  17 g Oral Daily PRN Roberto Carlos HAMILTON MD        enoxaparin (LOVENOX) injection 40 mg  40 mg SubCUTAneous Daily Roberto Carlos HAMILTON MD   40 mg at 11/18/21 1007    aspirin EC tablet 81 mg  81 mg Oral Daily Roberto Carlos HAMILTON MD   81 mg at 11/18/21 1008    Or    aspirin suppository 300 mg  300 mg Rectal Daily Roberto Carlos Sheehan MD        labetalol (NORMODYNE;TRANDATE) injection 10 mg  10 mg IntraVENous Q10 Min PRN Roberto Carlos HAMILTON MD           ROS : A 10-14 system review of constitutional, cardiovascular, respiratory, eyes, musculoskeletal, endocrine, GI, ENT, skin, hematological, genitourinary, psychiatric and neurologic systems was obtained and updated today and is unremarkable except as mentioned in my HPI      Exam:     Constitutional:   Vitals:    11/18/21 0515 11/18/21 0722 11/18/21 0759 11/18/21 1136   BP: (!) 142/84 (!) 175/96  124/73   Pulse: 61 67  67   Resp: 18 17  17   Temp: 98.5 °F (36.9 °C) 98.1 °F (36.7 °C)  97.8 °F (36.6 °C)   TempSrc: Oral Oral  Oral   SpO2: 100% 100%  100%   Weight:   133 lb 9.6 oz (60.6 kg)    Height:           General appearance:  Normal development and appear in no acute distress. Eye:  Fundus of the eye: Funduscopic examination cannot be performed due to COVID19 restrictions and precautions. Neck: supple  Cardiovascular: No lower leg edema with good pulsation. Mental Status:   Oriented to person, but not to problem or place  Memory: Impaired immediate recall. Intact remote memory  Normal attention span and concentration.   Language: intact naming, repeating and fluency   Poor fund of knowledge  Cranial Nerves:   II: Visual fields: Full. Pupils: equal, round, reactive to light  III,IV,VI: Extra Ocular Movements are intact. No nystagmus  V: Facial sensation is intact   VII: Facial strength and movements: intact and symmetric  VIII: Hearing: Intact  IX: Palate elevation is symmetric  XI: Shoulder shrug is intact  XII: Tongue movements are normal  Musculoskeletal: 5/5 in right side. Left-sided weakness 3/5. With increased swelling in her left wrist   tone: Normal tone. Reflexes: 2+ in the upper extremity and 2+ in the lower extremity   Planters: flexor bilaterally. Coordination: Left drift and poor REM on the left  Sensation: normal to all modalities in both arms and legs. Gait/Posture: Unsteady hemiplegic gait favoring left side     Data:  LABS:   Lab Results   Component Value Date     11/17/2021    K 3.7 11/17/2021     11/17/2021    CO2 24 11/17/2021    BUN 15 11/17/2021    CREATININE 0.9 11/17/2021    GFRAA >60 11/17/2021    LABGLOM 59 11/17/2021    GLUCOSE 93 11/17/2021    CALCIUM 9.6 11/17/2021     Lab Results   Component Value Date    WBC 8.7 11/18/2021    RBC 3.95 11/18/2021    HGB 12.3 11/18/2021    HCT 36.4 11/18/2021    MCV 92.1 11/18/2021    RDW 15.0 11/18/2021     11/18/2021     Lab Results   Component Value Date    INR 1.12 11/17/2021    PROTIME 12.7 11/17/2021       Neuroimaging were independently reviewed by me and discussed results with the patient and her family  Reviewed notes from different physicians  Reviewed lab and blood testing    Impression:  Acute left-sided weakness. 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   Thromboembolic versus cardioembolic  Hypertension  Hyperlipidemia  Dementia  Advanced age    Recommendation:  MRI brain  Echo  PT OT  Speech evaluation  Aspirin  Statin  Check inflammatory markers in the serum given her left hand swelling  Telemetry line DVT and GI prophylaxis  Stroke education was provided today to her family and risk of worsening dementia  Lipid panel  A1c  Blood pressure control and continue blood pressure medication  Discussed with her family and PT  We will follow    MDM: High      Thank you for referring such patient. If you have any questions regarding my consult note, please don't hesitate to call me. Oscar Lizarraga MD  [phone]    This dictation was generated by voice recognition computer software.  Although all attempts are made to edit the dictation for accuracy, there may be errors in the  transcription that are not intended

## 2021-11-18 NOTE — PROGRESS NOTES
Physical Therapy    Facility/Department: [address]  Initial Assessment    NAME: Jacquie Trent  : 1930  MRN: [id number]    Date of Service: 2021    Discharge Recommendations:    Jacquie Trent scored a 17/24 on the AM-PAC short mobility form. Current research shows that an AM-PAC score of 17 or less is typically not associated with a discharge to the patient's home setting. Based on the patient's AM-PAC score and their current functional mobility deficits, it is recommended that the patient have 5-7 sessions per week of Physical Therapy at d/c to increase the patient's independence. At this time, this patient demonstrates the endurance, and/or tolerance for 3 hours of therapy each day, with a treatment frequency of 5-7x/wk. Please see assessment section for further patient specific details. If patient discharges prior to next session this note will serve as a discharge summary. Please see below for the latest assessment towards goals. PT Equipment Recommendations  Equipment Needed: No  Other: Defer to next level of care    Assessment   Body structures, Functions, Activity limitations: Decreased functional mobility ; Decreased strength; Decreased balance; Decreased cognition; Decreased endurance  Assessment: Pt presents to PT w/ decreased functional mobility secondary to decreased cognition, endurance, strength, and balance. Pt PLOF included ambulating w/ grandson indep. as well perform some household chores at home. Pt will continue to benefit from skilled PT in order to return to PLOF w/ functional mobility for increased safety w/ ambulation. Treatment Diagnosis: decreased functional mobility secondary to decreased cognition, endurance, strength, and balance  Prognosis: Good  Decision Making: Medium Complexity  Exam: Functional mobility and ambulation  Clinical Presentation: Evolving  PT Education: Goals; PT Role; Plan of Care; General Safety;  Family Education; Gait Training; Functional Mobility Training  Patient Education: Pt was educated on current condition and overall POC. Pt verbalized understanding will need reinforcement. Barriers to Learning: Cognition  REQUIRES PT FOLLOW UP: Yes  Activity Tolerance  Activity Tolerance: Patient Tolerated treatment well; Patient limited by endurance       Patient Diagnosis(es): The encounter diagnosis was Cerebrovascular accident (CVA), unspecified mechanism ([facility] [address] has no past medical history on file. has no past surgical history on file. Restrictions  Restrictions/Precautions  Restrictions/Precautions: Fall Risk (HIGH FALL RISK)  Required Braces or Orthoses?: Yes (Left wrist brace applied)  Position Activity Restriction  Other position/activity restrictions: 80 y.o. female with past medical history of hypertension, CAD, dyslipidemia, dementia presents from home with her grandson with concern for CVA. Patient does not provide her own history. Grandson at bedside is helpful. He states that several days ago he noticed his grandmother lose her balance momentarily while sitting down to use the toilet. But she has been normal since. Earlier today he noticed that she has been more sluggish than usual.  He also noticed some redness and swelling in her left hand with tenderness at the base of her thumb. He asked his friend who is a \"nurse\" to take a look at her. At the exam it appeared that patient has difficulty elevating left arm and friend said it is concerning for possible stroke and advised to come to the ER. He states that otherwise his grandmother's mentation is at baseline. She is dependent on him for bathing toileting dressing and cooking, however, she is able to eat by herself. At the ED patient with grossly unremarkable work-up CT CTA head show no acute pathology. Hospitalist consulted for admission to rule out CVA.      Vision/Hearing  Vision: Within Functional Limits  Hearing: Within functional limits Subjective  General  Chart Reviewed: Yes  Patient assessed for rehabilitation services?: Yes  Response To Previous Treatment: Not applicable  Family / Caregiver Present: Yes (Grandson)  Diagnosis: Acute CVA  Follows Commands: Impaired  General Comment  Comments: Pt reports she is in no pain, questionnable historian  Subjective  Subjective: Pt sitting in recliner chair upon arrival PT/OT  Pain Screening  Patient Currently in Pain: Denies  Pain Assessment  Pain Assessment: 0-10  Pain Level: 0  Patient's Stated Pain Goal: No pain  Pain Type: Acute pain  Pain Location: Hand  Pain Orientation: Left  Vital Signs  Patient Currently in Pain: Denies       Orientation  Orientation  Overall Orientation Status: Impaired  Orientation Level: Disoriented to situation; Disoriented to place; Oriented to person; Disoriented to time     Social/Functional History  Social/Functional History  Lives With: Other (comment) (Grandson)  Type of Home: House  Home Layout: One level  Home Access: Stairs to enter with rails  Entrance Stairs - Number of Steps: 3  Entrance Stairs - Rails: Right  Bathroom Shower/Tub: Tub/Shower unit, Shower chair with back  Bathroom Toilet: Standard  Bathroom Equipment: Grab bars in shower, Shower chair, Grab bars around toilet  Bathroom Accessibility: Accessible  Home Equipment: [employer], Rolling walker  Receives Help From: Family  ADL Assistance: Independent  Homemaking Assistance: Needs assistance (Pt receives help from grandson)  Homemaking Responsibilities: No  Ambulation Assistance: Needs assistance (Pt walks w/ grandson next to her w/ no AD)  Transfer Assistance: Independent  Active : No  Patient's  Info: Grandson  Mode of Transportation: Car  Leisure & Hobbies: Pt enjoys walking and sitting on porch  Additional Comments: Pt reports no falls in past 6 months, questionnable historian     Cognition   Cognition  Overall Cognitive Status: Exceptions  Arousal/Alertness: Inconsistent responses to stimuli  Following Commands: Follows multistep commands consistently; Follows multistep commands with increased time; Follows multistep commands with repitition  Attention Span: Difficulty dividing attention  Memory: Decreased recall of recent events  Safety Judgement: Decreased awareness of need for assistance; Decreased awareness of need for safety  Problem Solving: Assistance required to generate solutions; Assistance required to implement solutions; Assistance required to identify errors made  Insights: Decreased awareness of deficits  Initiation: Requires cues for some  Sequencing: Requires cues for some    Objective     Observation/Palpation  Posture: Good    AROM RLE (degrees)  RLE AROM: WFL  RLE General AROM: Demonstrated by functional mobility  AROM LLE (degrees)  LLE AROM : WFL  LLE General AROM: Demonstrated by functional mobility  Strength RLE  Strength RLE: WFL  Comment: Grossly 4-/5  Strength LLE  Strength LLE: WFL  Comment: Grossly 3+/5     Sensation  Overall Sensation Status: [facility]  Bed mobility  Comment: No bed mobility assessed on this date due to pt sitting in recliner chair upon arrival  Transfers  Sit to Stand: Minimal Assistance  Stand to sit: Minimal Assistance  Comment: Pt requires min Ax1 due to pt currently not bearing weight through left hand; pt can initiate movement but requires assist for upright standing from recliner chair 2x total and RW in front  Ambulation  Ambulation?: Yes  More Ambulation?: No  Ambulation 1  Surface: level tile  Device: Rolling Walker  Assistance: Minimal assistance  Gait Deviations: Slow Ginger; Decreased step length; Decreased step height; Shuffles  Distance: 50'  Comments: Pt demonstrates noticeable weakness in LLE vs RLE. Pt ambulated w/ RW min Ax1.   Stairs/Curb  Stairs?: No     Balance  Posture: Good  Sitting - Static: Good (Pt demonstrates no LOB w/ SBA sitting edge of recliner)  Sitting - Dynamic: Good (Pt demonstrates no LOB w/ SBA sitting edge of recliner)  Standing - Static: Fair (Pt requires 2 HHA w/ RW in standing w/ CGA; pt stood at sink for oral care ~3-5 minutes total)  Standing - Dynamic: Fair (Pt requires 2 HHA w/ RW in standing w/ CGA)        Plan   Plan  Times per week: 5-7  Times per day: Daily  Plan weeks: Upon d/c  Current Treatment Recommendations: Strengthening, Endurance Training, Neuromuscular Re-education, Patient/Caregiver Education & Training, ROM, Balance Training, Gait Training, Functional Mobility Training, Stair training  Safety Devices  Type of devices: All fall risk precautions in place, Call light within reach, Chair alarm in place, Gait belt, Patient at risk for falls, Left in chair, Nurse notified  Restraints  Initially in place: No    AM-PAC Score  AM-PAC Inpatient Mobility Raw Score : 17 (11/18/21 1139)  AM-PAC Inpatient T-Scale Score : 42.13 (11/18/21 1139)  Mobility Inpatient CMS 0-100% Score: 50.57 (11/18/21 1139)  Mobility Inpatient CMS G-Code Modifier : CK (11/18/21 1139)          Goals  Short term goals  Time Frame for Short term goals: Upon d/c  Short term goal 1: Pt will perform bed mobility CGA  Short term goal 2: Pt will perform transfers w/ LRAD CGA  Short term goal 3: Pt will ambulate 100' w/ LRAD CGA  Patient Goals   Patient goals : Pt did not state goals       Therapy Time   Individual Concurrent Group Co-treatment   Time In 1048         Time Out 1134         Minutes 46         Timed Code Treatment Minutes: 31 Minutes  Total Treatment Minutes:  [address], SPT    PT providing direct supervision during session and assisting in making skilled judgements throughout session.   [id number] [facility] PT, DPT [id number]   [id number] [facility], PT

## 2021-11-18 NOTE — PROGRESS NOTES
[facility] PROGRESS NOTE    11/18/2021 2:44 PM        Name: Helen Andrews . Admitted: 11/17/2021  Primary Care Provider: Morris Cedillo MD (Tel: [phone])      Chief Complaint   Patient presents with    Extremity Weakness     Pt to ER from home for complaint of L sided swelling in hand, pain, and weakness. Pt also has been \"slowed down\" fatigued per pts family. Pt with hx of dementia, but able to answer questions at baseline.  Altered Mental Status     Brief History: Patient is a 81 yo female with hx dementia, CAD, HLD, HTN. She presented to ER with concern for CVA. Patient lives with grandson, she is dependent on him for all ADLs except feeding. Grandson noticed increased weakness and fatigue. She had difficulty lifting arm and nurse friend advised to be evaluated in ER. CT head and CTA head. neck with no acute pathology. Admitted to rule out CVA. Subjective:  Presently asleep in bedside chair, awakens easily. States she feels fine, offers no complaints. ROS limited by dementia.      Reviewed interval ancillary notes    Current Medications  amLODIPine (NORVASC) tablet 10 mg, Daily  atorvastatin (LIPITOR) tablet 40 mg, Daily  carvedilol (COREG) tablet 3.125 mg, BID WC  ondansetron (ZOFRAN-ODT) disintegrating tablet 4 mg, Q8H PRN   Or  ondansetron (ZOFRAN) injection 4 mg, Q6H PRN  polyethylene glycol (GLYCOLAX) packet 17 g, Daily PRN  enoxaparin (LOVENOX) injection 40 mg, Daily  aspirin EC tablet 81 mg, Daily   Or  aspirin suppository 300 mg, Daily  labetalol (NORMODYNE;TRANDATE) injection 10 mg, Q10 Min PRN        Objective:  /73   Pulse 67   Temp 97.8 °F (36.6 °C) (Oral)   Resp 17   Ht 5' (1.524 m)   Wt 133 lb 9.6 oz (60.6 kg)   SpO2 100%   BMI 26.09 kg/m²     Intake/Output Summary (Last 24 hours) at 11/18/2021 1444  Last data filed at 11/18/2021 1428  Gross per 24 hour   Intake 480 ml   Output 900 ml   Net -420 ml      Wt Readings from Last 3 Encounters:   11/18/21 133 lb 9.6 oz (60.6 kg)   10/18/21 132 lb (59.9 kg)   06/04/21 131 lb 12.8 oz (59.8 kg)       General appearance:  Appears comfortable  Eyes: Sclera clear. Pupils equal.  ENT: Moist oral mucosa. Cardiovascular: Regular rhythm, normal S1, S2. No murmur. + edema left wrist, velcro splint in place  Respiratory: Not using accessory muscles. Good inspiratory effort. Clear to auscultation bilaterally, no wheeze or crackles. GI: Abdomen soft, no tenderness, not distended, normal bowel sounds  Musculoskeletal: Swelling in left wrist/hand   Neurology: Moves all extremities, mild weakness left arm. No speech deficits  Psych: Normal affect. Alert and oriented to self  Skin: Warm, dry, normal turgor    Labs and Tests:  CBC:   Recent Labs     11/17/21  1611 11/18/21  0433   WBC 8.9 8.7   HGB 13.7 12.3    145     BMP:    Recent Labs     11/17/21  1611      K 3.7      CO2 24   BUN 15   CREATININE 0.9   GLUCOSE 93     Hepatic:   Recent Labs     11/17/21  1611   AST 17   ALT 8*   BILITOT 0.8   ALKPHOS 142*       CT Head 11/17/2021:  No acute intracranial abnormality.       Atrophy and microvascular ischemic disease in the cerebral white matter.         CTA Head/Neck 11/17/2021:  1. No significant stenosis or occlusion of the cervical vasculature. 2. No large vessel occlusion intracranially. CXR 11/17/2021:  No acute cardiopulmonary abnormality. Xray Left Shoulder/Elbow/Wrist/Hand 11/17/2021:  1. Dorsal soft tissue swelling of the left wrist and hand.  There is a tiny   mineralized density within the dorsal soft tissues of the wrist suggestive of   a tiny avulsion fragment likely arising from the triquetrum. 2. Subtle deformity of the radial styloid process suspicious for nondisplaced   fracture.  Discrete fracture line is otherwise not well seen.    3. No other acute osseous abnormality in the left shoulder, elbow, wrist or hand.   4. Diffuse osteopenia. Venous Doppler LUE 11/18/2021:  Left   No evidence of deep vein thrombosis or superficial vein thrombosis of the left   upper extremity or right internal jugular vein and subclavian vein. Incidental finding: Left Multiple heterogeneous structures noted in the right   thyroid. The largest is vascularized and measures 1.4 x 0.8 cm x 1.4 cm long. MRI Brain 11/18/2021:  1. No acute intracranial abnormality.  No acute infarct. 2. Moderate global parenchymal volume loss with moderate chronic   microvascular ischemic changes. 3.  The ventricular dilatation is slightly out of proportion to the cortical   sulci.  A component of normal pressure hydrocephalus cannot be excluded. Problem List  Active Problems:    Cerebrovascular accident (CVA) ([facility] [address]    HTN (hypertension), benign    Dyslipidemia    Dementia due to Alzheimer's disease ([facility] [address]  Resolved Problems:    * No resolved hospital problems. *       Assessment & Plan:   1. Left sided weakness. CT head with no acute abnormality. CTA head/neck with no significant stenosis. MRI brain with no acute CVA. Continue asa, statin. Echo pending. 2. Left hand/arm swelling. No history of injury. Venous doppler negative for DVT. Xray suspicious for nondisplaced fracture of radial styloid process. Ortho consulted, no evidence of fracture, suspect sprain vs contusion. Recommend velcro wrist splint. Repeat xrays in a couple of weeks. Add Tylenol prn pain. 3. HTN. Elevated on presentation but improved. Continue amlodipine and carvedilol. 4. Dementia. Attempted to call grandjennifer Anthony to provide update. Message left. Diet: ADULT DIET; Regular;  Low Sodium (2 gm)  Code:Full Code  DVT PPX: enoxaparin      VICTORINA Elaine - CNP   11/18/2021 2:44 PM

## 2021-11-18 NOTE — H&P
Hospital Medicine History & Physical      PCP: Heather Cates MD    Date of Admission: 11/17/2021    Date of Service: Pt seen/examined on 11/17/2021 and Admitted to Inpatient with expected LOS greater than two midnights due to medical therapy. Chief Complaint: Concern for CVA, left hand swelling      History Of Present Illness:  80 y.o. female with past medical history of hypertension, CAD, dyslipidemia, dementia presents from home with her grandson with concern for CVA. Patient does not provide her own history. Grandson at bedside is helpful. He states that several days ago he noticed his grandmother lose her balance momentarily while sitting down to use the toilet. But she has been normal since. Earlier today he noticed that she has been more sluggish than usual.  He also noticed some redness and swelling in her left hand with tenderness at the base of her thumb. He asked his friend who is a \"nurse\" to take a look at her. At the exam it appeared that patient has difficulty elevating left arm and friend said it is concerning for possible stroke and advised to come to the ER. He states that otherwise his grandmother's mentation is at baseline. She is dependent on him for bathing toileting dressing and cooking, however, she is able to eat by herself. At the ED patient with grossly unremarkable work-up CT CTA head show no acute pathology. Hospitalist consulted for admission to rule out CVA. Past Medical History:      History reviewed. No pertinent past medical history. Past Surgical History:      History reviewed. No pertinent surgical history. Medications Prior to Admission:      Prior to Admission medications    Medication Sig Start Date End Date Taking?  Authorizing Provider   atorvastatin (LIPITOR) 40 MG tablet Take 1 tablet by mouth daily 10/18/21  Yes Farhan Colorado MD   Vitamin D (CHOLECALCIFEROL) 25 MCG (1000 UT) TABS tablet Take 2 tablets by mouth daily 10/18/21  Yes Sabas DBUOSE MD Felix   amLODIPine (NORVASC) 10 MG tablet Take 1 tablet by mouth daily 10/18/21  Yes Chan Brown MD   carvedilol (COREG) 3.125 MG tablet Take 1 tablet by mouth 2 times daily (with meals) 10/18/21  Yes Chan Brown MD   donepezil (ARICEPT ODT) 10 MG disintegrating tablet Take 10 mg by mouth nightly   Yes Historical Provider, MD       Allergies:  Prochlorperazine    Social History:      The patient currently lives home    TOBACCO:   reports that she has never smoked. She has never used smokeless tobacco.  ETOH:   reports previous alcohol use. E-Cigarettes/Vaping Use     Questions Responses    E-Cigarette/Vaping Use Never User    Start Date     Passive Exposure     Quit Date     Counseling Given     Comments             Family History:       Reviewed in detail and negative for DM, CAD, Cancer, CVA. Positive as follows:    History reviewed. No pertinent family history. REVIEW OF SYSTEMS:   Pertinent positives as noted in the HPI. All other systems reviewed and negative. PHYSICAL EXAM PERFORMED:    BP (!) 112/100   Pulse 65   Temp 98.6 °F (37 °C) (Oral)   Resp 19   SpO2 100%     General appearance:  No apparent distress, appears stated age and cooperative. HEENT:  Normal cephalic, atraumatic without obvious deformity. Pupils equal, round, and reactive to light. Extra ocular muscles intact. Conjunctivae/corneas clear. Neck: Supple, with full range of motion. No jugular venous distention. Trachea midline. Respiratory:  Normal respiratory effort. Clear to auscultation, bilaterally without Rales/Wheezes/Rhonchi. Cardiovascular:  Regular rate and rhythm with normal S1/S2 without murmurs, rubs or gallops. Abdomen: Soft, non-tender, non-distended with normal bowel sounds. Musculoskeletal: Patient has swelling of the dorsum of her left arm and distal forearm. Skin: Skin color, texture, turgor normal.  No rashes or lesions.   Neurologic:  Neurovascularly intact without any focal sensory/motor deficits. Cranial nerves: II-XII intact, grossly non-focal.  Psychiatric:  Alert and oriented, thought content appropriate, normal insight  Capillary Refill: Brisk,< 3 seconds   Peripheral Pulses: +2 palpable, equal bilaterally       Labs:     Recent Labs     11/17/21  1611   WBC 8.9   HGB 13.7   HCT 41.3        Recent Labs     11/17/21  1611      K 3.7      CO2 24   BUN 15   CREATININE 0.9   CALCIUM 9.6     Recent Labs     11/17/21  1611   AST 17   ALT 8*   BILITOT 0.8   ALKPHOS 142*     Recent Labs     11/17/21  1611   INR 1.12     Recent Labs     11/17/21  1611   CKTOTAL 91   TROPONINI <0.01       Urinalysis:    No results found for: Madelaine Alcantara, [address], [address], RBCUA, BLOODU, [address], [address]    Radiology:     CXR: I have reviewed the CXR with the following interpretation: No acute cardiopulmonary process  EKG:  I have reviewed the EKG with the following interpretation: NSR, no acute ischemic changes    XR CHEST PORTABLE   Final Result   No acute cardiopulmonary abnormality. CTA HEAD NECK W CONTRAST   Final Result   1. No significant stenosis or occlusion of the cervical vasculature. 2. No large vessel occlusion intracranially. CT HEAD WO CONTRAST   Final Result   No acute intracranial abnormality. Atrophy and microvascular ischemic disease in the cerebral white matter.          VL Extremity Venous Left    (Results Pending)       ASSESSMENT:    Active Hospital Problems    Diagnosis Date Noted    Acute cerebrovascular accident (CVA) ([facility] [address] [I63.9] 11/17/2021         PLAN:    Concern for CVA  Admit on telemetry  Aspirin statin  Neurology consultation  MRI in the morning    Left hand swelling  Unclear whether there is history of injury  Tender to palpation at the base of the thumb  ER ordered duplex ultrasound to rule out DVT  Consider plain radiograph if negative for DVT to evaluate for fracture    Hypertension  Continue home meds    DVT Prophylaxis: Lovenox  Diet: Diet NPO  Code Status: No Order    PT/OT Eval Status: Ordered    Dispo -inpatient      Electronically signed by Nai Pena MD on 11/17/2021 at 7:37 PM

## 2021-11-18 NOTE — PROGRESS NOTES
Speech Language Pathology  Facility/Department: [address]  Initial Assessment  DYSPHAGIA BEDSIDE SWALLOW EVALUATION     Patient: Donna Shukla   : 1930   MRN: [id number]      Evaluation Date: 2021   Admitting Diagnosis: Acute cerebrovascular accident (CVA) ([facility] [address] [I63.9]  Cerebrovascular accident (CVA), unspecified mechanism ([address] [I63.9]  Pain: Pt denies pain at this time                         H&P: 80 y.o. female with past medical history of hypertension, CAD, dyslipidemia, dementia presents from home with her grandson with concern for CVA. Patient does not provide her own history. Grandson at bedside is helpful. He states that several days ago he noticed his grandmother lose her balance momentarily while sitting down to use the toilet. But she has been normal since. Earlier today he noticed that she has been more sluggish than usual.  He also noticed some redness and swelling in her left hand with tenderness at the base of her thumb. He asked his friend who is a \"nurse\" to take a look at her. At the exam it appeared that patient has difficulty elevating left arm and friend said it is concerning for possible stroke and advised to come to the ER. He states that otherwise his grandmother's mentation is at baseline. She is dependent on him for bathing toileting dressing and cooking, however, she is able to eat by herself. At the ED patient with grossly unremarkable work-up CT CTA head show no acute pathology. Hospitalist consulted for admission to rule out CVA. Chest xray:  Impression   No acute cardiopulmonary abnormality.          MRI Brain:     Impression   1. No acute intracranial abnormality.  No acute infarct. 2. Moderate global parenchymal volume loss with moderate chronic   microvascular ischemic changes.    3.  The ventricular dilatation is slightly out of proportion to the cortical   sulci.  A component of normal pressure hydrocephalus cannot be excluded.     History/Prior Level of Function:   Living Status: Home with grandson    Prior Dysphagia History: No prior dysphagia history per chart review. Dysphagia Impressions/Diagnosis: Oropharyngeal Dysphagia   Pt alert but demonstrates baseline confusion and distractibility upon my arrival. Only clinical swallow evaluation completed at this time with speech language cognitive assessment to be completed as indicated. No overt facial asymmetry noted at rest or with completion of OME. Mild/mod reduced bolus control, mastication and A-P propulsion noted with all textures. Rapid and premature bolus loss to pharynx noted with thin liquids via straw and cup. Prolonged mastication with delayed oral clearing requiring a thin liquid \"rinse\" to clear oral cavity noted with solid textures. Clinical symptoms of pharyngeal pooling with delayed swallow initiation and reduced laryngeal excursion during the swallow noted with all textures. Pt also noted to be highly distractible with tendency to speak prior to achieving swallow initiation/pharyngeal clearing with variable textures. Although no overt signs of aspiration noted with any texture, precautions should be followed due to reduced bolus control and delayed airway protection as well as poor safety awareness when self feeding. Recommended Diet and Intervention 11/18/2021:  Diet Solids Recommendation:  Dysphagia III Soft and Bite-Sized  Liquid Consistency Recommendation: Thin liquids Recommended form of Meds:   Whole or crushed with applesauce     Compensatory Swallowing Strategies: Alternate solids/liquids , Check for pocketing of food R, Upright as possible with all PO intake , No straws , Eat/feed slowly, Remain upright 30-45 min , limit distractions    SHORT TERM DYSPHAGIA GOALS/PLAN OF CARE: Speech therapy for dysphagia tx 3-5 times per week during acute care stay.     Pt will functionally tolerate recommended diet with no overt clinical s/s of aspiration  Pt will demonstrate understanding of aspiration risk and precautions via education/demonstration with occasional prompting  Pt will advance to least restrictive diet as indicated   If clinical s/s of aspiration/penetration continue to be noted, Pt will participate in Modified Barium Swallow Study      Dysphagia Therapeutic Intervention:  Bolus Control Exercise, Diet Tolerance Monitoring , Patient/Family Education     Discharge Recommendations: Recommend ongoing SLP for intensive dysphagia therapy upon discharge from hospital     Patient Positioning: Upright in chair    Current Diet Level (prior to evaluation): regular diet with thin liquids    Respiratory Status:   [x]Room Air   []O2 via nasal cannula   []Other:    Dentition:  [x]Adequate  []Dentures   []Missing Many Teeth  []Edentulous  []Other:    Baseline Vocal Quality:  []Normal  []Dysphonic   []Aphonic   []Hoarse  []Wet  [x]Weak  []Other:    Volitional Swallow:   []Absent   [x]Delayed     []Adequate     []Required use of drink     Oral Mechanism Exam:  []WFL [x]Mild   [] Moderate  []Severe  []To be assessed  Impaired:   []Left side      []Right side    [x]Labial ROM/Coordination    []Labial Symmetry   [x]Lingual ROM/Coordination   []Lingual Symmetry  []Gag  []Other:     Oral Phase: []WFL [x]Mild   [x] Moderate  []Severe  []To be assessed   [x]Impaired/Prolonged Mastication:   []Spillage Left:   []Spillage Right:  []Pocketing Left:   [x]Pocketing Right:   [x]Decreased Anterior to Posterior Transit:   [x]Suspected Premature Bolus Loss:   [x]Lingual/Palatal Residue:   []Other:     Pharyngeal Phase: []WFL [x]Mild   [x] Moderate  []Severe  []To be assessed   [x]Delayed Swallow:   [x]Suspected Pharyngeal Pooling:   [x]Decreased Laryngeal Elevation:   []Absent Swallow:  []Wet Vocal Quality:   []Throat Clearing-Immediate:   []Throat Clearing-Delayed:   []Cough-Immediate:   []Cough-Delayed:  []Change in Vital Signs:  [x]Suspected Delayed Pharyngeal Clearing:  []Other: Eating Assistance:  []Independent  []Setup or clean-up assistance   [x] Supervision or touching assistance   [] Partial or moderate assistance   [] Substantial or maximal assistance  [] Dependent       EDUCATION:   Provided education regarding role of SLP, results of assessment, recommendations and general speech pathology plan of care. [] Pt verbalized understanding and agreement   [x] Pt requires ongoing learning   [] No evidence of comprehension     If patient discharges prior to next visit, this note will serve as discharge.      Timed Code Minutes: 0 min  Total Treatment Minutes: 30 min    Nancy Silva [id number]

## 2021-11-18 NOTE — PROGRESS NOTES
Occupational Therapy   Occupational Therapy Initial Assessment  Date: 2021   Patient Name: Marium Gonzales  MRN: [id number]     : 1930    Date of Service: 2021    Discharge Recommendations:Katherine Serrano scored a  on the AM-PAC ADL Inpatient form. Current research shows that an AM-PAC score of 17 or less is typically not associated with a discharge to the patient's home setting. Based on the patient's AM-PAC score and their current ADL deficits, it is recommended that the patient have 5-7 sessions per week of Occupational Therapy at d/c to increase the patient's independence. At this time, this patient demonstrates the endurance, and/or tolerance for 3 hours of therapy each day, with a treatment frequency of 5-7x/wk. Please see assessment section for further patient specific details. If patient discharges prior to next session this note will serve as a discharge summary. Please see below for the latest assessment towards goals. 5-7 sessions per week  OT Equipment Recommendations  Other: TBD at next level care. Assessment   Performance deficits / Impairments: Decreased functional mobility ; Decreased endurance; Decreased strength; Decreased ADL status; Decreased high-level IADLs; Decreased coordination; Decreased cognition; Decreased balance; Decreased fine motor control  Assessment: Patient presenting below her baseline with the above functional deficits associated with TIA. She also presents with signs of minimal left sided neglect which will require additional assessment to confirm. Decreased fine and gross motor coordination, balance (with gait abnormality/Left sided weakness) heavily impacting performance, independence, and safety with all ADLs and functional mobility. Patinet would benefit from intensive rehab in order to facilitate safe and successful return to [facility]. Grandson who is POA is supportive and agreealble to ARU considerations.  Patient to continue with OT in order to maximize independence with ADLs prior to DC. Treatment Diagnosis: Above deficits associated with TIA  Prognosis: Good  Decision Making: Low Complexity  Exam: as above  OT Education: OT Role; ADL Adaptive Strategies; Family Education; Plan of Care  Patient Education: patient/grandson education in DC planning/process. Addressed grandson questions regarding splint and no activity restrictions at this time. Grandson verbalized understanding. Barriers to Learning: cognition. REQUIRES OT FOLLOW UP: Yes  Activity Tolerance  Activity Tolerance: Patient Tolerated treatment well; Patient limited by fatigue; Treatment limited secondary to decreased cognition  Safety Devices  Safety Devices in place: Yes  Type of devices: All fall risk precautions in place; Left in chair; Nurse notified; Call light within reach; Chair alarm in place  Restraints  Initially in place: No           Patient Diagnosis(es): The encounter diagnosis was Cerebrovascular accident (CVA), unspecified mechanism ([facility] [address] has no past medical history on file. has no past surgical history on file. Treatment Diagnosis: Above deficits associated with TIA      Restrictions  Restrictions/Precautions  Restrictions/Precautions: Fall Risk (HIGH FALL RISK)  Required Braces or Orthoses?: Yes (Left wrist brace applied)  Position Activity Restriction  Other position/activity restrictions: 80 y.o. female with past medical history of hypertension, CAD, dyslipidemia, dementia presents from home with her grandson with concern for CVA. Patient does not provide her own history. Grandson at bedside is helpful. He states that several days ago he noticed his grandmother lose her balance momentarily while sitting down to use the toilet. But she has been normal since. Earlier today he noticed that she has been more sluggish than usual.  He also noticed some redness and swelling in her left hand with tenderness at the base of her thumb.   He asked his friend who is a \"nurse\" to take a look at her. At the exam it appeared that patient has difficulty elevating left arm and friend said it is concerning for possible stroke and advised to come to the ER. He states that otherwise his grandmother's mentation is at baseline. She is dependent on him for bathing toileting dressing and cooking, however, she is able to eat by herself. At the ED patient with grossly unremarkable work-up CT CTA head show no acute pathology. Hospitalist consulted for admission to rule out CVA. Subjective   General  Patient assessed for rehabilitation services?: Yes  Family / Caregiver Present: Yes  Diagnosis: TIA  Subjective  Subjective: Patient in chair upon arrival. She is pleasant and agreeable to therapy. Grandson who is POA present during evaluation. Social/Functional History  Social/Functional History  Lives With: Other (comment) (Grandson)  Type of Home: House  Home Layout: One level  Home Access: Stairs to enter with rails  Entrance Stairs - Number of Steps: 3  Entrance Stairs - Rails: Right  Bathroom Shower/Tub: Tub/Shower unit, Shower chair with back  Bathroom Toilet: Standard  Bathroom Equipment: Grab bars in shower, Shower chair, Grab bars around toilet  Bathroom Accessibility: Accessible  Home Equipment: [employer], Rolling walker  Receives Help From: Family  ADL Assistance: Independent  Homemaking Assistance: Needs assistance (Pt receives help from grandson)  Homemaking Responsibilities: No  Ambulation Assistance: Needs assistance (Pt walks w/ grandson next to her w/ no AD)  Transfer Assistance: Independent  Active : No  Patient's  Info: Grandson  Mode of Transportation: Car  Leisure & Hobbies: Pt enjoys walking and sitting on porch  Additional Comments: Pt reports no falls in past 6 months, questionnable historian       Objective        Orientation  Overall Orientation Status: Impaired  Orientation Level: Oriented to person; Disoriented to place;  Disoriented to time; Disoriented to situation  Observation/Palpation  Posture: Good  Observation: Ortho NP applying standard cock-up splint on L wrist for increased support secondary to exhisting closed fractures. Balance  Sitting Balance: Stand by assistance  Standing Balance: Minimal assistance  Standing Balance  Time: 3-4 min  Activity: ambulation, transfers  Functional Mobility  Functional - Mobility Device: Rolling Walker  Activity: Other  Assist Level: Minimal assistance  Functional Mobility Comments: Patient ambulated 75ft in room and hallway with rolling walker and with Min A requiring max verbal/tactile cues for hand placement (primarily left hand). Notable LLE weakness impacting gait. Please see PT notes for details regarding gait quality. Toilet Transfers  Toilet Transfer: Unable to assess  Toilet Transfers Comments: Patient declined toilet txfer despite ecouragement due to fatigue  ADL  Feeding: Minimal assistance; Verbal cueing  Grooming: Moderate assistance (Mod A for oral care activities in standing at sink with max cues for sequencing.)  Additional Comments: Patient declines to participate in ADLs despite encouragement. She indicates fatigue after ambulation/transfer/visual assessments. Tone RUE  RUE Tone: Normotonic  Tone LUE  LUE Tone: Normotonic  Coordination  Movements Are Fluid And Coordinated: No  Coordination and Movement description: Decreased speed; Decreased accuracy; Gross motor impairments; Fine motor impairments  Quality of Movement Other  Comment: Patient showing signs of fine and gross motor impairments particularly on L side. Note: difficulty with processing/delay may skew inferences regrading possible L neglect     Bed mobility  Comment: Unable to assess due to patient in chair upon arrival and departure. Transfers  Sit to stand: Minimal assistance  Stand to sit: Minimal assistance  Transfer Comments: Min Ax 2 for sit<>stand txfers at recliner x 4 trials with increased cues for hand placement. Frame for Short term goals: Discharge  Short term goal 1: Increase sit<>stand from Min Ax 2 to CGA  Short term goal 2: Complete UB dressing with Min A  Short term goal 3: Complete LB dressing with Mod A  Short term goal 4: complete grooming/oral care ADLs in standing with CGA and reduced cues for sequencing  Short term goal 5: Complete toileting tasks with Min A and with increased safety awareness.   Patient Goals   Patient goals : Return home       Therapy Time   Individual Concurrent Group Co-treatment   Time In 1197         Time Out 1134         Minutes 56              Timed Code Treatment Minutes:  31 Minutes    Total Treatment Minutes:  [address] OTR/L  [id number]

## 2021-11-18 NOTE — PROGRESS NOTES
Speech Language Pathology  [place] Georges   11/8/1930    Speech Therapy/Clinical Swallow evaluation order received. Pt is currently off the floor for MRI and is unable to be seen for evaluation at this time. Will re-attempt at a later time as schedule allows.     Geneva Lucas [id number]

## 2021-11-18 NOTE — PROGRESS NOTES
Pt's grandson, Cuco Lane, identified pt's last known well as Monday (11/16/21) at 18 and stated \"Taylor stumbled going to the bathroom. I caught her. She was sitting on toilet when her head dropped to the side. I asked taylor if she was feeling okay and she said yes. \"  He added that he thought her left arm was swollen \"because of how she slept on it. \"  Jen stated he brought pt to the hospital after he had the neighbor, who is an RN, evaluate the pt. Jen has POA, copy has been received and is placed in the chart. Jen is aware of treatment plan. Brooke stated his mother was treated here at [facility] for stroke and rehab. Pt remains orientated to self only. Video monitoring continues for pt safety, bed alarm on, call light within reach.

## 2021-11-18 NOTE — CONSULTS
[facility] Orthopedic Surgery  Consult Note    Patient: Lyndon Lennox  Admit Date: 11/17/2021  Requesting Physician: Salena Goodpasture, MD  Room: [address] [place] [id number]    Chief complaint: Left wrist pain and swelling    HPI: Lyndon Lennox is a 80 y.o. female who presented to [facility] last night with complaints of general fatigue, left-sided weakness, rule out CVA. Patient does have a dementia and is a poor historian. Her grandson is at bedside and assists with history. He denies any falls or significant injuries to the left wrist.  Reports it noted some swelling a day or 2 ago with some pain with movement. He felt that she rolled over awkwardly in bed on it. She is using her left wrist to hold foot and eat without much issue currently. Describes pain in the left wrist with movement of mild to moderate intensity and of aching nature since 1-2 days ago which is relieved by rest. Denies new numbness/tingling. She is right-hand dominant retired individual.    Imaging review of the left wrist via plain films demonstrated: no apparent fracture noted despite radiologist read of \"possible\" nondisplaced radial styloid fracture. Osteopenia, small amt soft tissue swelling. Doppler of left arm shows no DVT/SVT. Patient lives in a private home with family and uses a walker to ambulate. Medical History:  History reviewed. No pertinent past medical history. History reviewed. No pertinent surgical history. Social History:    reports that she has never smoked. She has never used smokeless tobacco.    Family History:  History reviewed. No pertinent family history.     Medications:  ALL MEDICATIONS HAVE BEEN REVIEWED:  Scheduled:   amLODIPine  10 mg Oral Daily    atorvastatin  40 mg Oral Daily    carvedilol  3.125 mg Oral BID WC    enoxaparin  40 mg SubCUTAneous Daily    aspirin  81 mg Oral Daily    Or    aspirin  300 mg Rectal Daily     Continuous:  PRN:ondansetron **OR** ondansetron, polyethylene glycol, labetalol    Allergies: Allergies   Allergen Reactions    Prochlorperazine Other (See Comments)       Review of Systems:  Constitutional: Negative for fever, chills, fatigue. Skin:  Negative for pruritis, rash  Eyes: Negative for photophobia and visual disturbance. ENT:  Negative for rhinorrhea, epistaxis, sore throat  Respiratory:  Negative for cough and shortness of breath. Cardiovascular: Negative for chest pain. Gastrointestinal: Negative for nausea, vomiting, diarrhea. Genitourinary: Negative for dysuria and difficulty urinating. Neurological: Negative for confusion, dysarthria, tremors, seizures. Psychiatric:  Negative for depression or anxiety  Musculoskeletal:  Positive for left wrist pain/swelling    Objective:  Vitals:    11/18/21 0722   BP: (!) 175/96   Pulse: 67   Resp: 17   Temp: 98.1 °F (36.7 °C)   SpO2: 100%      Physical Examination:  GENERAL: No apparent distress, well-nourished  SKIN:  Warm and dry  EYES: Nonicteric. ENT: Mucous membranes moist  HEAD: Normocephalic, atraumatic  RESPIRATORY: Resp easy and unlabored  CARDIOVASCULAR: Regular rate and rhythm  GI: Abdomen soft, nontender  NEURO: Awake and alert. No speech defect  PSYCHIATRIC: Appropriate affect; not agitated  MUSCULOSKELETAL: Left wrist  Inspection: On exam there are no ulcerations, rashes or lesions about the left wrist.   There is pain to palpation of the distal radius diffusely. There is mild swelling noted without erythema or warmth. Mild pain with CMC grind test. No TTP about the 1st cmc or about the thumb elsewhere. Motor: She can slightly flex and extend the left wrist with some discomfort. She tolerates passive range of motion without crepitus or significant pain. She does have limited range of motion and lacks approximately 10 degrees of extension and 10 degrees of flexion due to swelling/pain.   Sensation: Grossly intact to light touch throughout the left upper extremity including median, radial, and ulnar distributions. Vascular: 2+ left radial pulse. Labs reviewed:  Recent Labs     11/17/21  1611 11/18/21  0433   WBC 8.9 8.7   HGB 13.7 12.3   HCT 41.3 36.4    145     Recent Labs     11/17/21  1611      K 3.7      CO2 24   BUN 15   CREATININE 0.9   GLUCOSE 93   CALCIUM 9.6     Recent Labs     11/17/21  1611   INR 1.12   PROTIME 12.7       No results found for: COLORU, CLARITYU, PH, PHUR, LABSPEC, GLUCOSEU, BLOODU, LEUKOCYTESUR, NITRITE, BILIRUBINUR, UROBILINOGEN, RBCUA, WBCUA, BACTERIA, AMORPHOUS, CRYSTAL    Imaging:  XR WRIST LEFT (2 VIEWS)   Preliminary Result   1. Dorsal soft tissue swelling of the left wrist and hand. There is a tiny   mineralized density within the dorsal soft tissues of the wrist suggestive of   a tiny avulsion fragment likely arising from the triquetrum. 2. Subtle deformity of the radial styloid process suspicious for nondisplaced   fracture. Discrete fracture line is otherwise not well seen. 3. No other acute osseous abnormality in the left shoulder, elbow, wrist or   hand. 4. Diffuse osteopenia. XR ELBOW LEFT (2 VIEWS)   Preliminary Result   1. Dorsal soft tissue swelling of the left wrist and hand. There is a tiny   mineralized density within the dorsal soft tissues of the wrist suggestive of   a tiny avulsion fragment likely arising from the triquetrum. 2. Subtle deformity of the radial styloid process suspicious for nondisplaced   fracture. Discrete fracture line is otherwise not well seen. 3. No other acute osseous abnormality in the left shoulder, elbow, wrist or   hand. 4. Diffuse osteopenia. XR SHOULDER LEFT (MIN 2 VIEWS)   Preliminary Result   1. Dorsal soft tissue swelling of the left wrist and hand. There is a tiny   mineralized density within the dorsal soft tissues of the wrist suggestive of   a tiny avulsion fragment likely arising from the triquetrum.    2. Subtle deformity of the radial styloid process suspicious for nondisplaced   fracture. Discrete fracture line is otherwise not well seen. 3. No other acute osseous abnormality in the left shoulder, elbow, wrist or   hand. 4. Diffuse osteopenia. XR HAND LEFT (2 VIEWS)   Preliminary Result   1. Dorsal soft tissue swelling of the left wrist and hand. There is a tiny   mineralized density within the dorsal soft tissues of the wrist suggestive of   a tiny avulsion fragment likely arising from the triquetrum. 2. Subtle deformity of the radial styloid process suspicious for nondisplaced   fracture. Discrete fracture line is otherwise not well seen. 3. No other acute osseous abnormality in the left shoulder, elbow, wrist or   hand. 4. Diffuse osteopenia. XR CHEST PORTABLE   Final Result   No acute cardiopulmonary abnormality. CTA HEAD NECK W CONTRAST   Final Result   1. No significant stenosis or occlusion of the cervical vasculature. 2. No large vessel occlusion intracranially. CT HEAD WO CONTRAST   Final Result   No acute intracranial abnormality. Atrophy and microvascular ischemic disease in the cerebral white matter. VL Extremity Venous Left    (Results Pending)   MRI brain without contrast    (Results Pending)       IMPRESSION:  LEFT wrist sprain vs contusion  Active Problems:    Acute cerebrovascular accident (CVA) ([address]  Resolved Problems:    * No resolved hospital problems. *      RECOMMENDATIONS:  Recommend conservative treatments of the left wrist.  Velcro wrist splint; may be removed for bathing purposes. - WBAT in splint  - Ice and elevation of the affected extremity  - Pain control  - DVT prophylaxis: Lovenox per primary team.  - PT/OT  - Dispo: Per primary team    Can follow-up in approx. two weeks for re-evaluation/repeat films of the left wrist with Dr. Navneet Flood or Dr. Phoenix Lima whom she has seen a few months ago. [phone]    Patient denies tobacco use.     I have reviewed imaging and plan with  Sonia Bush.       Carol Roberto, APRN - CNP  11/18/2021  9:33 AM

## 2021-11-19 VITALS
DIASTOLIC BLOOD PRESSURE: 82 MMHG | HEIGHT: 60 IN | HEART RATE: 66 BPM | TEMPERATURE: 98.1 F | OXYGEN SATURATION: 98 % | RESPIRATION RATE: 18 BRPM | BODY MASS INDEX: 26.23 KG/M2 | WEIGHT: 133.6 LBS | SYSTOLIC BLOOD PRESSURE: 160 MMHG

## 2021-11-19 LAB
ANION GAP SERPL CALCULATED.3IONS-SCNC: 12 MMOL/L (ref 3–16)
BUN BLDV-MCNC: 14 MG/DL (ref 7–20)
CALCIUM SERPL-MCNC: 9.5 MG/DL (ref 8.3–10.6)
CHLORIDE BLD-SCNC: 104 MMOL/L (ref 99–110)
CO2: 22 MMOL/L (ref 21–32)
CREAT SERPL-MCNC: 0.8 MG/DL (ref 0.6–1.2)
GFR AFRICAN AMERICAN: >60
GFR NON-AFRICAN AMERICAN: >60
GLUCOSE BLD-MCNC: 90 MG/DL (ref 70–99)
LV EF: 63 %
LVEF MODALITY: NORMAL
POTASSIUM SERPL-SCNC: 3.7 MMOL/L (ref 3.5–5.1)
SODIUM BLD-SCNC: 138 MMOL/L (ref 136–145)
URINE CULTURE, ROUTINE: NORMAL

## 2021-11-19 PROCEDURE — 6360000002 HC RX W HCPCS: Performed by: INTERNAL MEDICINE

## 2021-11-19 PROCEDURE — 92526 ORAL FUNCTION THERAPY: CPT

## 2021-11-19 PROCEDURE — 93306 TTE W/DOPPLER COMPLETE: CPT

## 2021-11-19 PROCEDURE — 97530 THERAPEUTIC ACTIVITIES: CPT

## 2021-11-19 PROCEDURE — 6370000000 HC RX 637 (ALT 250 FOR IP): Performed by: INTERNAL MEDICINE

## 2021-11-19 PROCEDURE — 80048 BASIC METABOLIC PNL TOTAL CA: CPT

## 2021-11-19 PROCEDURE — 99232 SBSQ HOSP IP/OBS MODERATE 35: CPT | Performed by: PSYCHIATRY & NEUROLOGY

## 2021-11-19 RX ORDER — ASPIRIN 81 MG/1
81 TABLET ORAL DAILY
Qty: 30 TABLET | Refills: 1 | Status: SHIPPED | OUTPATIENT
Start: 2021-11-20 | End: 2022-08-18 | Stop reason: ALTCHOICE

## 2021-11-19 RX ADMIN — ENOXAPARIN SODIUM 40 MG: 40 INJECTION SUBCUTANEOUS at 10:11

## 2021-11-19 RX ADMIN — AMLODIPINE BESYLATE 10 MG: 5 TABLET ORAL at 10:11

## 2021-11-19 RX ADMIN — ASPIRIN 81 MG: 81 TABLET, COATED ORAL at 10:11

## 2021-11-19 RX ADMIN — CARVEDILOL 3.12 MG: 3.12 TABLET, FILM COATED ORAL at 10:11

## 2021-11-19 ASSESSMENT — PAIN SCALES - GENERAL
PAINLEVEL_OUTOF10: 0

## 2021-11-19 NOTE — PROGRESS NOTES
Diana Espinoza  Neurology Follow-up  [facility] Neurology    Date of Service: 11/19/2021    Subjective:   CC: Follow up today regarding: Left-sided weakness    Events noted. Chart and lab reviewed. The patient had MRI which showed no acute stroke. She looks better today. Discussed with the daughter over the phone. Patient denies any headache or chest pain or dysphagia or dysarthria. Other review of system was unremarkable    Family history: Noncontributory    Medical history:  Hypertension  Stroke  Dementia  Hyperlipidemia      Surgical history: Noncontributory  ROS : A 10-12 system review obtained and updated today and is unremarkable except as mentioned  in my interval history.        Current Facility-Administered Medications   Medication Dose Route Frequency Provider Last Rate Last Admin    perflutren lipid microspheres (DEFINITY) injection 1.65 mg  1.5 mL IntraVENous ONCE PRN VICTORINA Cardenas - CNP        acetaminophen (TYLENOL) tablet 650 mg  650 mg Oral Q6H PRN Evelyne Schmidt APRN - CNP        amLODIPine (NORVASC) tablet 10 mg  10 mg Oral Daily Roberto Carlos HAMILTON MD   10 mg at 11/19/21 1011    atorvastatin (LIPITOR) tablet 40 mg  40 mg Oral Daily Roberto Carlos HAMILTON MD   40 mg at 11/18/21 2052    carvedilol (COREG) tablet 3.125 mg  3.125 mg Oral BID WC Roberto Carlos HAMILTON MD   3.125 mg at 11/19/21 1011    ondansetron (ZOFRAN-ODT) disintegrating tablet 4 mg  4 mg Oral Q8H PRN Roberto Carlos HAMILTON MD        Or    ondansetron (ZOFRAN) injection 4 mg  4 mg IntraVENous Q6H PRN Roberto Carlos HAMILTON MD        polyethylene glycol (GLYCOLAX) packet 17 g  17 g Oral Daily PRN Roberto Carlos HAMILTON MD        enoxaparin (LOVENOX) injection 40 mg  40 mg SubCUTAneous Daily Roberto Carlos HAMILTON MD   40 mg at 11/19/21 1011    aspirin EC tablet 81 mg  81 mg Oral Daily Roberto Carlos HAMILTON MD   81 mg at 11/19/21 1011    Or    aspirin suppository 300 mg  300 mg Rectal Daily Roberto Carlos Andrew MD        labetalol (NORMODYNE;TRANDATE) injection 10 mg  10 mg IntraVENous Q10 Min PRN Roberto Carlos Klein MD         Allergies   Allergen Reactions    Prochlorperazine Other (See Comments)      reports that she has never smoked. She has never used smokeless tobacco. She reports previous alcohol use. She reports that she does not use drugs. Objective:  Exam:   Constitutional:   Vitals:    11/19/21 0020 11/19/21 0345 11/19/21 0729 11/19/21 1015   BP: (!) 146/87 (!) 176/93 104/80 (!) 160/82   Pulse: 68 69 86 66   Resp: 17 16 18    Temp: 98.7 °F (37.1 °C) 98.6 °F (37 °C) 98.1 °F (36.7 °C)    TempSrc: Oral Oral Oral    SpO2: 99% 100% 99%    Weight:       Height:         General appearance:  Normal development and appear in no acute distress. Mental Status:   Oriented to person, but not to problem  Poor immediate recall  Poor fund of knowledge  Intact naming  Good attention     cranial Nerves:   II: Visual fields: Full. Pupils: equal, round, reactive to light  III,IV,VI: Extra Ocular Movements are intact.  No nystagmus  V: Facial sensation is intact  VII: Facial strength and movements: intact and symmetric  XII: Tongue movements are normal  Musculoskeletal: Mild left-sided weakness today  Normal tone  No drift today  Normal sensation  Gait not tested        Data:  LABS:   Lab Results   Component Value Date     11/17/2021    K 3.7 11/17/2021     11/17/2021    CO2 24 11/17/2021    BUN 15 11/17/2021    CREATININE 0.9 11/17/2021    GFRAA >60 11/17/2021    LABGLOM 59 11/17/2021    GLUCOSE 93 11/17/2021    CALCIUM 9.6 11/17/2021     Lab Results   Component Value Date    WBC 8.7 11/18/2021    RBC 3.95 11/18/2021    HGB 12.3 11/18/2021    HCT 36.4 11/18/2021    MCV 92.1 11/18/2021    RDW 15.0 11/18/2021     11/18/2021     Lab Results   Component Value Date    INR 1.12 11/17/2021    PROTIME 12.7 11/17/2021       Neuroimaging was independently reviewed by me and discussed results with the patient and her family  I reviewed blood testing and other test results and discussed results with the patient      Impression:  ?  Acute left-sided weakness seems to be improving. MRI showed no acute stroke. Hypertension  Left wrist swelling and possible fracture  Hyperlipidemia  Chronic cognitive impairment and dementia      Recommendation  Continue current supportive care  Discussed risk of falling with family  PT OT  Speech  Telemetry  Aspirin  Statin  Continue home blood pressure medications  Can be discharged from neurology once medically stable  No further recommendation  We will sign off. Discussed with her daughter over the phone. Pollo Ovalle MD   [phone]      This dictation was generated by voice recognition computer software. Although all attempts are made to edit the dictation for accuracy, there may be errors in the transcription that are not intended.

## 2021-11-19 NOTE — PROGRESS NOTES
Physical Therapy  Facility/Department: [address]  Daily Treatment Note  NAME: Marium Gonzales  : 1930  MRN: [id number]    Date of Service: 2021    Discharge Recommendations:  Marium Gonzales scored a 16/24 on the AM-PAC short mobility form. Current research shows that an AM-PAC score of 17 or less is typically not associated with a discharge to the patient's home setting. Based on the patient's AM-PAC score and their current functional mobility deficits, it is recommended that the patient have 5-7 sessions per week of Physical Therapy at d/c to increase the patient's independence. At this time, this patient demonstrates the endurance, and/or tolerance for 3 hours of therapy each day, with a treatment frequency of 5-7x/wk. Please see assessment section for further patient specific details. If patient discharges prior to next session this note will serve as a discharge summary. Please see below for the latest assessment towards goals. 24 hour supervision or assist, 5-7 sessions per week   PT Equipment Recommendations  Equipment Needed: No  Other: Defer to next level of care    Assessment   Body structures, Functions, Activity limitations: Decreased functional mobility ; Decreased strength; Decreased balance; Decreased cognition; Decreased endurance; Decreased posture  Assessment: Patient requires assistance for bed mobility and transfers, refused ambulation this date. Will need 24 hour assist and continued therapy at d/c as patient was ambulating independently w/o device prior to admission. Prognosis: Good  PT Education: Goals; PT Role; Plan of Care; General Safety; Family Education; Gait Training; Functional Mobility Training; Transfer Training  Patient Education: Pt was educated on current condition and overall POC. Pt verbalized understanding will need reinforcement.   Barriers to Learning: Cognition  REQUIRES PT FOLLOW UP: Yes  Activity Tolerance  Activity Tolerance: Patient limited by endurance; Patient limited by fatigue; Patient limited by cognitive status  Activity Tolerance: Patient agreeable to getting up to chair, denied further mobility. Patient Diagnosis(es): The encounter diagnosis was Cerebrovascular accident (CVA), unspecified mechanism ([address] has no past medical history on file. has no past surgical history on file. Restrictions  Restrictions/Precautions  Restrictions/Precautions: Fall Risk (high fall risk)  Required Braces or Orthoses?: Yes  Required Braces or Orthoses  Left Upper Extremity Brace/Splint: L wrist brace  Position Activity Restriction  Other position/activity restrictions: 80 y.o. female with past medical history of hypertension, CAD, dyslipidemia, dementia presents from home with her grandson with concern for CVA. Patient does not provide her own history. Grandson at bedside is helpful. He states that several days ago he noticed his grandmother lose her balance momentarily while sitting down to use the toilet. But she has been normal since. Earlier today he noticed that she has been more sluggish than usual.  He also noticed some redness and swelling in her left hand with tenderness at the base of her thumb. He asked his friend who is a \"nurse\" to take a look at her. At the exam it appeared that patient has difficulty elevating left arm and friend said it is concerning for possible stroke and advised to come to the ER. He states that otherwise his grandmother's mentation is at baseline. She is dependent on him for bathing toileting dressing and cooking, however, she is able to eat by herself. At the ED patient with grossly unremarkable work-up CT CTA head show no acute pathology. Hospitalist consulted for admission to rule out CVA. Subjective   General  Chart Reviewed: Yes  Response To Previous Treatment: Patient with no complaints from previous session.   Family / Caregiver Present: No  Subjective  Subjective: Patient denies pain, agreeable to getting out of bed. General Comment  Comments: Patient sitting up in bed, eating. Orientation  Orientation  Overall Orientation Status: Impaired  Orientation Level: Disoriented to situation; Disoriented to place; Oriented to person; Disoriented to time       Objective   Bed mobility  Supine to Sit: Moderate assistance  Scooting: Maximal assistance  Comment: Elevated HOB, bed rail used, still required assistance, unable to sit up on her own. Transfers  Sit to Stand: Contact guard assistance  Stand to sit: Contact guard assistance  Bed to Chair: Minimal assistance  Stand Pivot Transfers: Minimal Assistance  Comment: Gait belt donned. Unsteady steps to chair, refused use of walker.   Ambulation  Ambulation?: No (Patient politely declined.)     Balance  Posture: Good  Sitting - Static: Good  Sitting - Dynamic: Good  Standing - Static: Fair  Standing - Dynamic: Fair      AROM RLE (degrees)  RLE AROM: WFL  RLE General AROM: Demonstrated by functional mobility  AROM LLE (degrees)  LLE AROM : WFL  LLE General AROM: Demonstrated by functional mobility  Strength RLE  Strength RLE: WFL  Strength LLE  Strength LLE: WFL           AM-PAC Score  -PAC Inpatient Mobility Raw Score : 16 (11/19/21 1114)  AM-PAC Inpatient T-Scale Score : 40.78 (11/19/21 1114)  Mobility Inpatient CMS 0-100% Score: 54.16 (11/19/21 1114)  Mobility Inpatient CMS G-Code Modifier : CK (11/19/21 1114)        Goals  Short term goals  Time Frame for Short term goals: Upon d/c  Short term goal 1: Pt will perform bed mobility CGA.  (Not met)  Short term goal 2: Pt will perform transfers w/ LRAD CGA.  (Not met)  Short term goal 3: Pt will ambulate 100' w/ LRAD CGA.  (Not met)  Patient Goals   Patient goals : Pt did not state goals    Plan    Plan  Times per week: 5-7  Times per day: Daily  Plan weeks: Upon d/c  Current Treatment Recommendations: Strengthening, Endurance Training, Neuromuscular Re-education, Patient/Caregiver Education & Training, ROM, Balance Training, Gait Training, Functional Mobility Training, Stair training  Safety Devices  Type of devices:  All fall risk precautions in place, Call light within reach, Chair alarm in place, Gait belt, Patient at risk for falls, Left in chair, Nurse notified, Lianet Gallagher in use  Restraints  Initially in place: No     Therapy Time   Individual Concurrent Group Co-treatment   Time In 1030         Time Out 1045         Minutes 15           Second Session Therapy Time:   Individual Concurrent Group Co-treatment   Time In 1055         Time Out 1120         Minutes 25         Timed Code Treatment Minutes: [id number] [place], [place], [facility] [address] [facility], ATC-R [id number]

## 2021-11-19 NOTE — PROGRESS NOTES
Occupational [place], [address] [facility]    Patient currently off the floor for an echo. Will attempt OT treatment as schedule allows. Thank you,  Nia Reynaga.  [address] [facility], [address] [facility]

## 2021-11-19 NOTE — PLAN OF CARE
Data- discharge order received, pt's grandson verbalized agreement to discharge, disposition to previous residence, no needs for HHC/DME. Action- discharge instructions prepared and given to pt/grandson Meliza Anthony, verbalized understanding. Medication information packet given r/t NEW and/or CHANGED prescriptions emphasizing name/purpose/side effects, pt verbalized understanding. Discharge instruction summary: Diet- regular, Activity- as tolerated, Primary Care Physician as follows: Landy Christie -[phone] f/u appointment and orthopedic surgeon, immunizations reviewed. CHF Education reviewed. Pt/ Family has had a total of 60 minutes CHF education this admission encounter. 1. WEIGHT: Admit Weight: 133 lb 6.4 oz (60.5 kg) (11/17/21 2030)        Today  Weight: 133 lb 9.6 oz (60.6 kg) (11/18/21 0759)       2. O2 SAT.: SpO2: 98 % (11/19/21 1015)    Response- Pt belongings gathered, IV removed. Disposition is home (no HHC/DME needs), transported with grandson, taken to lobby via ambulation with grandson, no complications.
Problem: Pain:  Goal: Pain level will decrease  Description: Pain level will decrease  Outcome: Ongoing     Problem: Skin Integrity:  Goal: Will show no infection signs and symptoms  Description: Will show no infection signs and symptoms  Outcome: Ongoing     Problem: HEMODYNAMIC STATUS  Goal: Patient has stable vital signs and fluid balance  Outcome: Ongoing     Problem: ACTIVITY INTOLERANCE/IMPAIRED MOBILITY  Goal: Mobility/activity is maintained at optimum level for patient  Outcome: Ongoing     Problem: COMMUNICATION IMPAIRMENT  Goal: Ability to express needs and understand communication  Outcome: Ongoing     Problem: Falls - Risk of:  Goal: Will remain free from falls  Description: Will remain free from falls  Outcome: Ongoing
Pt orientated to self only; family remains at bedside. Pt denies pain and requires frequent reorientation while awake. Pt & family understand recommendation for therapy. Call light within reach; video monitoring continues for pt safety.       Problem: Pain:  Goal: Pain level will decrease  Description: Pain level will decrease  Outcome: Ongoing  Goal: Control of acute pain  Description: Control of acute pain  Outcome: Ongoing  Goal: Control of chronic pain  Description: Control of chronic pain  Outcome: Ongoing     Problem: Skin Integrity:  Goal: Will show no infection signs and symptoms  Description: Will show no infection signs and symptoms  Outcome: Ongoing  Goal: Absence of new skin breakdown  Description: Absence of new skin breakdown  Outcome: Ongoing     Problem: HEMODYNAMIC STATUS  Goal: Patient has stable vital signs and fluid balance  Outcome: Ongoing     Problem: ACTIVITY INTOLERANCE/IMPAIRED MOBILITY  Goal: Mobility/activity is maintained at optimum level for patient  Outcome: Ongoing     Problem: COMMUNICATION IMPAIRMENT  Goal: Ability to express needs and understand communication  Outcome: Ongoing     Problem: Falls - Risk of:  Goal: Will remain free from falls  Description: Will remain free from falls  Outcome: Ongoing  Goal: Absence of physical injury  Description: Absence of physical injury  Outcome: Ongoing
without complication. Pt does not use walker/cane to ambulate. Pt is a contact guard assist due to weakness.       Problem: Pain:  Goal: Pain level will decrease  Description: Pain level will decrease  Outcome: Ongoing  Goal: Control of acute pain  Description: Control of acute pain  Outcome: Ongoing  Goal: Control of chronic pain  Description: Control of chronic pain  Outcome: Ongoing     Problem: Skin Integrity:  Goal: Will show no infection signs and symptoms  Description: Will show no infection signs and symptoms  Outcome: Ongoing  Goal: Absence of new skin breakdown  Description: Absence of new skin breakdown  Outcome: Ongoing     Problem: HEMODYNAMIC STATUS  Goal: Patient has stable vital signs and fluid balance  Outcome: Ongoing     Problem: ACTIVITY INTOLERANCE/IMPAIRED MOBILITY  Goal: Mobility/activity is maintained at optimum level for patient  Outcome: Ongoing

## 2021-11-19 NOTE — PROGRESS NOTES
Speech Language Pathology  Dysphagia Treatment Note    Name:  Savanna Barnes  :   1930  Medical Diagnosis:  Acute cerebrovascular accident (CVA) ([facility] [address] [I63.9]  Cerebrovascular accident (CVA), unspecified mechanism ([facility] [address] [I63.9]  Treatment Diagnosis: Oropharyngeal Dysphagia  Pain level: no reports of pain from the pt    Diet level prior to treatment: Dysphagia III Soft and Bite-Sized with thin liquids, medications whole or crushed with applesauce  Tolerance of Current Diet Level: No reports of difficulty reported by the pt, pt's RN, or pt's grandson    Assessment of Texture Tolerance:  -Impressions: Pt seated upright in her recliner with her grandson present upon SLP's arrival. Pt's RN and grandson report that the pt's baseline is to be oriented to self only (unable to recall her own birthday). Pt initially resistant to trialing items for today's therapy session, reporting \"I've had all of those\" but with further encouragement was agreeable to trialing a moist & minced trial (diced pears). Pt demonstrates prolonged mastication with minimal oral residue, cleared with liquid wash. The pt tolerated a mildly thick liquid via straw (smoothie brought in by her grandson) with no overt signs/symptoms of penetration/aspiration. She declined additional trials, reports feeling increased fatigue today. Pt's grandson reports that the pt's speech \"seems slower\" to him than her previous baseline. Speech-language/cognitive assessment was not completed today d/t arrival of the pt's M.D. to discuss discharge plans. Pt may benefit from completing an assessment to determine her current baseline and develop goals. Diet and Treatment Recommendations 2021:  Dysphagia III Soft and Bite-Sized with thin liquids, medications whole or crushed with applesauce    Compensatory strategies:  Alternate solids/liquids , Check for pocketing of food R, Upright as possible with all PO intake , No straws , Eat/feed slowly, Remain

## 2021-11-19 NOTE — CONSULTS
Primo Mathews  11/19/2021  [id number]    Rehab Brief Consult:    Patient unfortunately does not have a diagnosis that we would anticipate [facility] would approve for ARU level of care. Liason notified that family is wanting to take the patient home. If unable to provide 24 hour supervision, would suggest SNF. We will sign off. Thank you for the consultation.     Melina Norman MD 11/19/2021 2:42 PM

## 2021-11-19 NOTE — DISCHARGE INSTR - COC
Continuity of Care Form    Patient Name: Jacquie Trent   :  1930  MRN:  [id number]    Admit date:  2021  Discharge date:  2021    Code Status Order: Full Code   Advance Directives:      Admitting Physician:  Balaji Mckeon MD  PCP: Navya Ward MD    Discharging Nurse: [facility] Unit/Room#: 5YJ-7045/8915-75  Discharging Unit Phone Number: [phone]    Emergency Contact:   Extended Emergency Contact Information  Primary Emergency Contact: [address] Phone: [phone]  Work Phone: [phone]  Mobile Phone: [phone]  Relation: Grandchild  Preferred language: English   needed? No  Secondary Emergency Contact: [address] [place] [address] Phone: [phone]  Relation: Grandchild   needed? No    Past Surgical History:  History reviewed. No pertinent surgical history. Immunization History:   Immunization History   Administered Date(s) Administered    Influenza Virus Vaccine 2016, 2017    Influenza, High Dose (Fluzone 65 yrs and older) 2018, 10/22/2019    Influenza, High-dose, Quadv, 65 yrs +, IM (Fluzone) 10/27/2020    PPD Test 2018    Pneumococcal Conjugate 13-valent (Balinda ) 2017    Pneumococcal Polysaccharide ([id number]) 2018       Active Problems:  Patient Active Problem List   Diagnosis Code    Contusion of right hand S60.221A    Cerebrovascular accident (CVA) ([facility] [address] I63.9    HTN (hypertension), benign I10    Dyslipidemia E78.5    Dementia due to Alzheimer's disease ([facility] [address] G30.9, F02.80    Wrist pain, left M25.532       Isolation/Infection:   Isolation            No Isolation          Patient Infection Status       None to display            Nurse Assessment:  Last Vital Signs: BP (!) 160/82   Pulse 66   Temp 98.1 °F (36.7 °C) (Oral)   Resp 18   Ht 5' (1.524 m)   Wt 133 lb 9.6 oz (60.6 kg)   SpO2 98%   BMI 26.09 kg/m²     Last documented pain score (0-10 scale): Pain Level: 0  Last Weight:    Wt Readings from Last 1 Encounters:   11/18/21 133 lb 9.6 oz (60.6 kg)     Mental Status:  disoriented and alert    IV Access:  - None    Nursing Mobility/ADLs:  Walking   Assisted  Transfer  Assisted  Bathing  Assisted  Dressing  Assisted  Toileting  Assisted  Feeding  Assisted  Med Admin  Assisted  Med Delivery   whole    Wound Care Documentation and Therapy:        Elimination:  Continence: Bowel: Yes  Bladder: Yes  Urinary Catheter: None   Colostomy/Ileostomy/Ileal Conduit: No       Date of Last BM: 11/17/2021    Intake/Output Summary (Last 24 hours) at 11/19/2021 1423  Last data filed at 11/18/2021 1428  Gross per 24 hour   Intake 240 ml   Output --   Net 240 ml     I/O last 3 completed shifts: In: 480 [P.O.:480]  Out: 900 [Urine:900]    Safety Concerns:     History of Falls (last 30 days) and At Risk for Falls    Impairments/Disabilities:      dementia    Nutrition Therapy:  Current Nutrition Therapy:   - Oral Diet:  General and Dental Soft    Routes of Feeding: Oral  Liquids: No Restrictions  Daily Fluid Restriction: no  Last Modified Barium Swallow with Video (Video Swallowing Test): not done    Treatments at the Time of Hospital Discharge:   Respiratory Treatments: none  Oxygen Therapy:  is not on home oxygen therapy.   Ventilator:    - No ventilator support    Rehab Therapies: Physical Therapy, Occupational Therapy, and Speech/Language Therapy  Weight Bearing Status/Restrictions: No weight bearing restirctions  Other Medical Equipment (for information only, NOT a DME order):  wheelchair, walker, bedside commode, and hospital bed  Other Treatments: n/a    Patient's personal belongings (please select all that are sent with patient):  Glasses, Dentures upper and lower    RN SIGNATURE:  Electronically signed by Gayle Lebron RN on 11/19/21 at 4:17 PM EST    CASE MANAGEMENT/SOCIAL WORK SECTION    Inpatient Status Date: ***    Readmission Risk Assessment Score:  Readmission Risk              Risk of Unplanned

## 2021-11-19 NOTE — DISCHARGE SUMMARY
[facility] DISCHARGE SUMMARY    Patient Demographics    Patient. Lillian Late  Date of Birth. 11/8/1930  MRN. [id number]     Primary care provider. Yvan Franco MD  (Tel: [phone])    Admit date: 11/17/2021    Discharge date (blank if same as Note Date): Note Date: 11/19/2021     Reason for Hospitalization. Chief Complaint   Patient presents with    Extremity Weakness     Pt to ER from home for complaint of L sided swelling in hand, pain, and weakness. Pt also has been \"slowed down\" fatigued per pts family. Pt with hx of dementia, but able to answer questions at baseline.  Altered Mental Status         Significant Findings. Active Problems:    Cerebrovascular accident (CVA) ([address]    HTN (hypertension), benign    Dyslipidemia    Dementia due to Alzheimer's disease ([address]    Wrist pain, left  Resolved Problems:    * No resolved hospital problems. *       Problems and results from this hospitalization that need follow up. 1. Weekly CMP x 2.   2. Consider repeat xrays left wrist and hand in a couple of weeks. Significant test results and incidental findings. CT Head 11/17/2021:  No acute intracranial abnormality.       Atrophy and microvascular ischemic disease in the cerebral white matter.          CTA Head/Neck 11/17/2021:  1. No significant stenosis or occlusion of the cervical vasculature. 2. No large vessel occlusion intracranially.      CXR 11/17/2021:  No acute cardiopulmonary abnormality.     Xray Left Shoulder/Elbow/Wrist/Hand 11/17/2021:  1. Dorsal soft tissue swelling of the left wrist and hand.  There is a tiny   mineralized density within the dorsal soft tissues of the wrist suggestive of   a tiny avulsion fragment likely arising from the triquetrum.    2. Subtle deformity of the radial styloid process suspicious for nondisplaced   fracture.  Discrete fracture line is otherwise not well seen. 3. No other acute osseous abnormality in the left shoulder, elbow, wrist or   hand. 4. Diffuse osteopenia.      Venous Doppler LUE 11/18/2021:  Left   No evidence of deep vein thrombosis or superficial vein thrombosis of the left   upper extremity or right internal jugular vein and subclavian vein. Incidental finding: Left Multiple heterogeneous structures noted in the right   thyroid. The largest is vascularized and measures 1.4 x 0.8 cm x 1.4 cm long.      MRI Brain 11/18/2021:  1. No acute intracranial abnormality.  No acute infarct. 2. Moderate global parenchymal volume loss with moderate chronic   microvascular ischemic changes. 3.  The ventricular dilatation is slightly out of proportion to the cortical   sulci.  A component of normal pressure hydrocephalus cannot be excluded.           Invasive procedures and treatments. 1. None     Problem-based Hospital Course. Patient is a 81 yo female with hx dementia, CAD, HLD, HTN. She presented to ER with concern for CVA. Patient lives with grandson, she is dependent on him for all ADLs except feeding. Grandson noticed increased weakness and fatigue. She had difficulty lifting arm and nurse friend advised to be evaluated in ER. CT head and CTA head. neck with no acute pathology. Admitted to rule out CVA. 1. Left sided weakness. CT head with no acute abnormality. CTA head/neck with no significant stenosis. MRI brain with no acute CVA. Echo with EF 60-65%, no evidence of shunt. Continued asa, statin. 2. Left hand/arm swelling. No history of injury. Venous doppler negative for DVT. Xray suspicious for nondisplaced fracture of radial styloid process. Ortho consulted, no evidence of fracture, suspect sprain vs contusion. Recommend velcro wrist splint. Repeat xrays in a couple of weeks. Tylenol prn pain. 3. HTN. Elevated on presentation but improved. Continued amlodipine and carvedilol. 4. Dementia. Stable. 5. Debility.  PT/OT evaluated and recommend ARU upon DC. Unfortunately, patient does not have diagnosis for ARU. Have recommended SNF but family prefers home with home care. She has 24/7 care.      Reviewed DC plans, follow up and medications with grandson Saroj Castano. Expresses understanding. Questions answered. Consults. IP CONSULT TO PHARMACY  PHARMACY TO CHANGE BASE FLUIDS  IP CONSULT TO NEUROLOGY  IP CONSULT TO ORTHOPEDIC SURGERY  IP CONSULT TO PHYSICAL MEDICINE REHAB    Physical examination on discharge day. BP (!) 160/82   Pulse 66   Temp 98.1 °F (36.7 °C) (Oral)   Resp 18   Ht 5' (1.524 m)   Wt 133 lb 9.6 oz (60.6 kg)   SpO2 98%   BMI 26.09 kg/m²   General appearance. Alert. Looks comfortable. HEENT. Sclera clear. Moist mucus membranes. Cardiovascular. Regular rate and rhythm, normal S1, S2. No murmur. Respiratory. Not using accessory muscles. Clear to auscultation bilaterally, no wheeze. Gastrointestinal. Abdomen soft, non-tender, not distended, normal bowel sounds  Neurology. Facial symmetry. No speech deficits. Moving all extremities equally. Extremities. No edema in lower extremities, velcro splint to left wrist.  Skin. Warm, dry, normal turgor    Condition at time of discharge stable    Medication instructions provided to patient at discharge.      Medication List      START taking these medications    aspirin 81 MG EC tablet  Take 1 tablet by mouth daily  Notes to patient: Thins blood to prevent clotting, ie heart attack or stroke  Side effects: may cause extra bruising or bleeding         CONTINUE taking these medications    amLODIPine 10 MG tablet  Commonly known as: NORVASC  Take 1 tablet by mouth daily  Notes to patient: Use: to treat high blood pressure and chest pain  Side effects: headache, swelling, dizziness     atorvastatin 40 MG tablet  Commonly known as: LIPITOR  Take 1 tablet by mouth daily  Notes to patient: Use: lower bad cholesterol  Side effects: headache, muscle pain, constipation, diarrhea carvedilol 3.125 MG tablet  Commonly known as: COREG  Take 1 tablet by mouth 2 times daily (with meals)  Notes to patient: Use: decreases the workload of the heart to allow heart to pump easier. Helps lower heart rate. Side effects: dizziness, fatigue     Vitamin D 25 MCG (1000 UT) Tabs tablet  Commonly known as: CHOLECALCIFEROL  Take 2 tablets by mouth daily  Notes to patient: Dietary/vitamin supplement  Side effects: discolored urine         STOP taking these medications    donepezil 10 MG disintegrating tablet  Commonly known as: ARICEPT ODT           Where to Get Your Medications      These medications were sent to [address], [address]  [address], [address] [facility] [address] [facility]    Phone: [phone]   · aspirin 81 MG EC tablet     Per family, patient was no longer taking Aricept. Discharge recommendations given to patient. Follow Up. PCP in 1 week   Disposition. home  Activity. activity as tolerated  Diet: ADULT DIET; Dysphagia - Soft and Bite Sized; No Drinking Straws      Spent 40 minutes in discharge process.     Signed:  VICTORINA Guillermo CNP     11/19/2021 2:18 PM

## 2022-02-03 RX ORDER — VITAMIN B COMPLEX
TABLET ORAL
Qty: 60 TABLET | Refills: 1 | OUTPATIENT
Start: 2022-02-03

## 2022-02-03 RX ORDER — CARVEDILOL 3.12 MG/1
TABLET ORAL
Qty: 60 TABLET | Refills: 1 | OUTPATIENT
Start: 2022-02-03

## 2022-02-03 RX ORDER — ATORVASTATIN CALCIUM 40 MG/1
TABLET, FILM COATED ORAL
Qty: 30 TABLET | Refills: 1 | OUTPATIENT
Start: 2022-02-03

## 2022-02-03 RX ORDER — AMLODIPINE BESYLATE 10 MG/1
TABLET ORAL
Qty: 30 TABLET | Refills: 1 | OUTPATIENT
Start: 2022-02-03

## 2022-02-03 NOTE — TELEPHONE ENCOUNTER
Medication:   Requested Prescriptions     Pending Prescriptions Disp Refills    Vitamin D (CHOLECALCIFEROL) 25 MCG (1000 UT) TABS tablet [Pharmacy Med Name: VITAMIN D3 1,000 UNIT TABLET] 60 tablet 1     Sig: TAKE 2 TABLETS BY MOUTH EVERY DAY       Last Filled:  Done 01.06.2022 #60 w/ 1 refill, request denied    Patient Phone Number: [phone] (home)     Last appt: 10/18/2021   Next appt: 2/14/2022    Last Labs DM: No results found for: Bryan Medrano

## 2022-02-03 NOTE — TELEPHONE ENCOUNTER
Medication:   Requested Prescriptions     Pending Prescriptions Disp Refills    atorvastatin (LIPITOR) 40 MG tablet [Pharmacy Med Name: ATORVASTATIN 40 MG TABLET] 30 tablet 1     Sig: TAKE 1 TABLET BY MOUTH EVERY DAY       Last Filled:  01.06.2022 #30 w/ 1 refill, request denied.  Refill on file    Patient Phone Number: [phone] (home)     Last appt: 10/18/2021   Next appt: 2/14/2022    Last Lipid:   Lab Results   Component Value Date    CHOL 117 11/18/2021    TRIG 56 11/18/2021    HDL 45 11/18/2021    LDLCALC 61 11/18/2021

## 2022-02-16 ENCOUNTER — OFFICE VISIT (OUTPATIENT)
Dept: PRIMARY CARE CLINIC | Age: 87
End: 2022-02-16
Payer: MEDICAID

## 2022-02-16 VITALS
HEIGHT: 60 IN | HEART RATE: 66 BPM | RESPIRATION RATE: 16 BRPM | WEIGHT: 132 LBS | SYSTOLIC BLOOD PRESSURE: 126 MMHG | OXYGEN SATURATION: 96 % | BODY MASS INDEX: 25.91 KG/M2 | DIASTOLIC BLOOD PRESSURE: 64 MMHG | TEMPERATURE: 97.4 F

## 2022-02-16 DIAGNOSIS — I10 ESSENTIAL HYPERTENSION: ICD-10-CM

## 2022-02-16 DIAGNOSIS — F02.80 ALZHEIMER'S DEMENTIA WITHOUT BEHAVIORAL DISTURBANCE, UNSPECIFIED TIMING OF DEMENTIA ONSET: ICD-10-CM

## 2022-02-16 DIAGNOSIS — G30.9 ALZHEIMER'S DEMENTIA WITHOUT BEHAVIORAL DISTURBANCE, UNSPECIFIED TIMING OF DEMENTIA ONSET: ICD-10-CM

## 2022-02-16 DIAGNOSIS — E78.5 DYSLIPIDEMIA: ICD-10-CM

## 2022-02-16 DIAGNOSIS — G45.9 TIA (TRANSIENT ISCHEMIC ATTACK): ICD-10-CM

## 2022-02-16 DIAGNOSIS — Z00.00 INITIAL MEDICARE ANNUAL WELLNESS VISIT: Primary | ICD-10-CM

## 2022-02-16 DIAGNOSIS — I25.10 CORONARY ARTERY DISEASE INVOLVING NATIVE HEART WITHOUT ANGINA PECTORIS, UNSPECIFIED VESSEL OR LESION TYPE: ICD-10-CM

## 2022-02-16 PROCEDURE — G0438 PPPS, INITIAL VISIT: HCPCS | Performed by: FAMILY MEDICINE

## 2022-02-16 RX ORDER — ASPIRIN 81 MG/1
81 TABLET ORAL DAILY
Qty: 30 TABLET | Refills: 3 | Status: SHIPPED | OUTPATIENT
Start: 2022-02-16

## 2022-02-16 ASSESSMENT — ENCOUNTER SYMPTOMS
SORE THROAT: 0
SHORTNESS OF BREATH: 0
BLOOD IN STOOL: 0
COUGH: 1
VOMITING: 0
ABDOMINAL PAIN: 0
NAUSEA: 0

## 2022-02-16 ASSESSMENT — PATIENT HEALTH QUESTIONNAIRE - PHQ9
1. LITTLE INTEREST OR PLEASURE IN DOING THINGS: 0
SUM OF ALL RESPONSES TO PHQ QUESTIONS 1-9: 0
SUM OF ALL RESPONSES TO PHQ9 QUESTIONS 1 & 2: 0
SUM OF ALL RESPONSES TO PHQ QUESTIONS 1-9: 0
2. FEELING DOWN, DEPRESSED OR HOPELESS: 0
SUM OF ALL RESPONSES TO PHQ QUESTIONS 1-9: 0
SUM OF ALL RESPONSES TO PHQ QUESTIONS 1-9: 0

## 2022-02-16 NOTE — PROGRESS NOTES
Medicare Annual Wellness Visit  Name: Sandeep Gunderson Date: 2022   MRN: [id number] Sex: Female   Age: 80 y.o. Ethnicity: Non- / Non    : 1930 Race: Danii Tavarez / African American      Ranjan Levels is here for Medicare AWV    Pt had L sided arm weakness back in 2021 and went to ER and was admitted for TIA  Of note pt had been off of her BP medication for 5 days and was not taking her daily Baby ASA at the time of her symptoms. Pt had an MRI brain which showed no acute CVA and a CTA head/neck showed no significant stenosis. Pt was resumed on a daily ASA and her symptoms resolved    Pt has a hx of HTN, CAD and dyslipidemia and is on norvasc, lipitor and coreg medications. Pt has been eating a very healthy mediterranean diet       Pt did not do COVID vaccine yet     Pt has a hx of Alzheimer's dementia and used to be on aricept medication in the past     Pt is a nonsmoker and denies alcohol use      Hospital discharge summary 2021  Patient is a 81 yo female with hx dementia, CAD, HLD, HTN. She presented to ER with concern for CVA. Patient lives with grandson, she is dependent on him for all ADLs except feeding. Grandson noticed increased weakness and fatigue. She had difficulty lifting arm and nurse friend advised to be evaluated in ER. CT head and CTA head. neck with no acute pathology. Admitted to rule out CVA.      1. Left sided weakness. CT head with no acute abnormality. CTA head/neck with no significant stenosis. MRI brain with no acute CVA. Echo with EF 60-65%, no evidence of shunt. Continued asa, statin. 2. Left hand/arm swelling. No history of injury. Venous doppler negative for DVT. Xray suspicious for nondisplaced fracture of radial styloid process. Ortho consulted, no evidence of fracture, suspect sprain vs contusion. Recommend velcro wrist splint. Repeat xrays in a couple of weeks. Tylenol prn pain. 3. HTN. Elevated on presentation but improved.  Continued amlodipine and carvedilol. 4. Dementia. Stable.   5. Debility. PT/OT evaluated and recommend ARU upon DC. Unfortunately, patient does not have diagnosis for ARU. Have recommended SNF but family prefers home with home care. She has 24/7 care.      Reviewed DC plans, follow up and medications with grandson Marnie Moore. Expresses understanding. Questions answered. Screenings for behavioral, psychosocial and functional/safety risks, and cognitive dysfunction are all negative except as indicated below. These results, as well as other patient data from the [address] form, are documented in Flowsheets linked to this Encounter. Allergies   Allergen Reactions    Prochlorperazine Other (See Comments)       Prior to Visit Medications    Medication Sig Taking? Authorizing Provider   aspirin (ECOTRIN LOW STRENGTH) 81 MG EC tablet Take 1 tablet by mouth daily Yes Manjinder Becerra MD   amLODIPine (NORVASC) 10 MG tablet TAKE 1 TABLET BY MOUTH EVERY DAY Yes Sabas Washington MD   atorvastatin (LIPITOR) 40 MG tablet TAKE 1 TABLET BY MOUTH EVERY DAY Yes Manjinder Becerra MD   Vitamin D (CHOLECALCIFEROL) 25 MCG (1000 UT) TABS tablet TAKE 2 TABLETS BY MOUTH EVERY DAY Yes Manjinder Becerra MD   carvedilol (COREG) 3.125 MG tablet TAKE 1 TABLET BY MOUTH TWICE A DAY WITH MEALS Yes Manjinder Becerra MD   aspirin 81 MG EC tablet Take 1 tablet by mouth daily Yes VICTORINA Lund - CNP     No past medical history on file. No past surgical history on file. No family history on file. CareTeam (Including outside providers/suppliers regularly involved in providing care):   Patient Care Team:  Manjinder Becerra MD as PCP - General (Family Medicine)  Manjinder Becerra MD as PCP - [facility]    Review of Systems   Constitutional: Negative for fatigue and fever. HENT: Negative for nosebleeds and sore throat. Eyes:        Negative blurred vision or diplopia   Respiratory: Positive for cough (dry x 2 weeks but did get a new furnace). Negative for shortness of breath. Cardiovascular: Negative for chest pain, palpitations and leg swelling. Gastrointestinal: Negative for abdominal pain, blood in stool, nausea and vomiting. Negative melena or indigestion   Endocrine: Negative for polydipsia and polyuria. Genitourinary: Negative for dysuria and hematuria. Musculoskeletal: Negative for arthralgias. Skin: Negative for rash. Neurological: Negative for dizziness, seizures, syncope, speech difficulty, weakness and headaches. Psychiatric/Behavioral: Negative for dysphoric mood. The patient is not nervous/anxious. Wt Readings from Last 3 Encounters:   02/16/22 132 lb (59.9 kg)   11/18/21 133 lb 9.6 oz (60.6 kg)   10/18/21 132 lb (59.9 kg)     Vitals:    02/16/22 1504   BP: 126/64   Pulse: 66   Resp: 16   Temp: 97.4 °F (36.3 °C)   SpO2: 96%   Weight: 132 lb (59.9 kg)   Height: 5' (1.524 m)     Body mass index is 25.78 kg/m². Based upon direct observation of the patient, evaluation of cognition reveals global memory impairment noted. Physical Exam  Vitals reviewed. Constitutional:       General: She is not in acute distress. HENT:      Mouth/Throat:      Mouth: Mucous membranes are moist.      Pharynx: Oropharynx is clear. Eyes:      General: No scleral icterus. Comments: Pink conjunctivae    Neck:      Thyroid: No thyromegaly. Comments: No carotid bruits noted B/L  Cardiovascular:      Heart sounds: Normal heart sounds. No murmur heard. No friction rub. No gallop. Pulmonary:      Effort: Pulmonary effort is normal.      Breath sounds: Normal breath sounds. Abdominal:      Palpations: Abdomen is soft. Tenderness: There is no abdominal tenderness. Musculoskeletal:      Comments: No leg edema noted B/L   Lymphadenopathy:      Cervical: No cervical adenopathy. Skin:     Findings: No rash. Comments:      Neurological:      Mental Status: She is alert.       Comments: Cranial nerves 2 - 12 grossly intact; Muscle strength 5/5 throughout   Psychiatric:         Mood and Affect: Mood normal.          Patient's complete Health Risk Assessment and screening values have been reviewed and are found in Flowsheets. The following problems were reviewed today and where indicated follow up appointments were made and/or referrals ordered. Positive Risk Factor Screenings with Interventions:      Cognitive: Words recalled: 2 Words Recalled  Clock Drawing Test (CDT): (!) Abnormal  Total Score Interpretation: Abnormal Mini-Cog    Cognitive Impairment Interventions:  · Patient has a hx of Alzheimer's dementia            Personalized Preventive Plan   Current Health Maintenance Status  Immunization History   Administered Date(s) Administered    Influenza Virus Vaccine 09/21/2016, 11/16/2017    Influenza, High Dose (Fluzone 65 yrs and older) 12/28/2018, 10/22/2019    Influenza, High-dose, Quadv, 65 yrs +, IM (Fluzone) 10/27/2020    PPD Test 04/25/2018    Pneumococcal Conjugate 13-valent ([id number]) 11/16/2017    Pneumococcal Polysaccharide ([id number]) 12/28/2018        Patient Instructions     Personalized Preventive Plan for Radha Vaughan - 2/16/2022  Medicare offers a range of preventive health benefits. Some of the tests and screenings are paid in full while other may be subject to a deductible, co-insurance, and/or copay. Some of these benefits include a comprehensive review of your medical history including lifestyle, illnesses that may run in your family, and various assessments and screenings as appropriate. After reviewing your medical record and screening and assessments performed today your provider may have ordered immunizations, labs, imaging, and/or referrals for you. A list of these orders (if applicable) as well as your Preventive Care list are included within your After Visit Summary for your review.     Other Preventive Recommendations:    · A preventive eye exam performed by an eye specialist is recommended every 1-2 years to screen for glaucoma; cataracts, macular degeneration, and other eye disorders. · A preventive dental visit is recommended every 6 months. · Try to get at least 150 minutes of exercise per week or 10,000 steps per day on a pedometer . · Order or download the FREE \"Exercise & Physical Activity: Your Everyday Guide\" from [employer]. Call 1-354.451.6654 or search [employer] online. · You need 5347-3978 mg of calcium and 0931-3163 IU of vitamin D per day. It is possible to meet your calcium requirement with diet alone, but a vitamin D supplement is usually necessary to meet this goal.  · When exposed to the sun, use a sunscreen that protects against both UVA and UVB radiation with an SPF of 30 or greater. Reapply every 2 to 3 hours or after sweating, drying off with a towel, or swimming. · Always wear a seat belt when traveling in a car. Always wear a helmet when riding a bicycle or motorcycle. Health Maintenance   Topic Date Due    COVID-19 Vaccine (1) Never done    DTaP/Tdap/Td vaccine (1 - Tdap) Never done    Shingles Vaccine (1 of 2) Never done   [employer] Visit (AWV)  Never done    Flu vaccine (1) 09/01/2021    Depression Screen  05/10/2022    Lipid screen  11/18/2022    Potassium monitoring  11/19/2022    Creatinine monitoring  11/19/2022    Pneumococcal 65+ years Vaccine  Completed    Hepatitis A vaccine  Aged Out    Hepatitis B vaccine  Aged Out    Hib vaccine  Aged Out    Meningococcal (ACWY) vaccine  Aged Out     Recommendations for [employer] Due: see orders and patient instructions/AVS.  . Recommended screening schedule for the next 5-10 years is provided to the patient in written form: see Patient Instructions/AVS.    1. Initial Medicare annual wellness visit    2.  Coronary artery disease involving native heart without angina pectoris, unspecified vessel or lesion type  Patient clinically stable on present medications and denies any CP or SOB  - aspirin (ECOTRIN LOW STRENGTH) 81 MG EC tablet; Take 1 tablet by mouth daily  Dispense: 30 tablet; Refill: 3    4. Essential hypertension  Patient clinically stable on present medications and had recent normal BMP bloodwork    5. TIA (transient ischemic attack)  Pt clinically improved with resuming of ASA medication and is with a nonfocal neuro exam  - aspirin (ECOTRIN LOW STRENGTH) 81 MG EC tablet; Take 1 tablet by mouth daily  Dispense: 30 tablet; Refill: 3    6.  Alzheimer's dementia without behavioral disturbance, unspecified timing of dementia onset [facility])  Patient has 24/7 supervision by her grandson and other family members      Patient was told to go to the ER ASAP if you develop any chest pain, shortness of breath, facial droop, slurred speech, weakness/numbness in your arms or legs or double vision

## 2022-02-16 NOTE — PATIENT INSTRUCTIONS
Personalized Preventive Plan for Ivette Chatterjee - 2/16/2022  Medicare offers a range of preventive health benefits. Some of the tests and screenings are paid in full while other may be subject to a deductible, co-insurance, and/or copay. Some of these benefits include a comprehensive review of your medical history including lifestyle, illnesses that may run in your family, and various assessments and screenings as appropriate. After reviewing your medical record and screening and assessments performed today your provider may have ordered immunizations, labs, imaging, and/or referrals for you. A list of these orders (if applicable) as well as your Preventive Care list are included within your After Visit Summary for your review. Other Preventive Recommendations:    · A preventive eye exam performed by an eye specialist is recommended every 1-2 years to screen for glaucoma; cataracts, macular degeneration, and other eye disorders. · A preventive dental visit is recommended every 6 months. · Try to get at least 150 minutes of exercise per week or 10,000 steps per day on a pedometer . · Order or download the FREE \"Exercise & Physical Activity: Your Everyday Guide\" from [employer]. Call 9-123.630.8399 or search [employer] online. · You need 4537-2228 mg of calcium and 6096-2300 IU of vitamin D per day. It is possible to meet your calcium requirement with diet alone, but a vitamin D supplement is usually necessary to meet this goal.  · When exposed to the sun, use a sunscreen that protects against both UVA and UVB radiation with an SPF of 30 or greater. Reapply every 2 to 3 hours or after sweating, drying off with a towel, or swimming. · Always wear a seat belt when traveling in a car. Always wear a helmet when riding a bicycle or motorcycle.

## 2022-02-17 ASSESSMENT — PATIENT HEALTH QUESTIONNAIRE - PHQ9
SUM OF ALL RESPONSES TO PHQ QUESTIONS 1-9: 0
1. LITTLE INTEREST OR PLEASURE IN DOING THINGS: 0
SUM OF ALL RESPONSES TO PHQ QUESTIONS 1-9: 0
2. FEELING DOWN, DEPRESSED OR HOPELESS: 0
SUM OF ALL RESPONSES TO PHQ QUESTIONS 1-9: 0
SUM OF ALL RESPONSES TO PHQ QUESTIONS 1-9: 0
SUM OF ALL RESPONSES TO PHQ9 QUESTIONS 1 & 2: 0

## 2022-02-17 ASSESSMENT — LIFESTYLE VARIABLES: HOW OFTEN DO YOU HAVE A DRINK CONTAINING ALCOHOL: NEVER

## 2022-03-12 DIAGNOSIS — I10 HTN (HYPERTENSION), BENIGN: ICD-10-CM

## 2022-03-12 DIAGNOSIS — E78.5 DYSLIPIDEMIA: Primary | ICD-10-CM

## 2022-03-14 RX ORDER — CARVEDILOL 3.12 MG/1
TABLET ORAL
Qty: 60 TABLET | Refills: 3 | Status: SHIPPED | OUTPATIENT
Start: 2022-03-14 | End: 2022-06-20

## 2022-03-14 RX ORDER — AMLODIPINE BESYLATE 10 MG/1
TABLET ORAL
Qty: 30 TABLET | Refills: 3 | Status: SHIPPED | OUTPATIENT
Start: 2022-03-14 | End: 2022-06-20

## 2022-03-14 RX ORDER — VITAMIN B COMPLEX
TABLET ORAL
Qty: 60 TABLET | Refills: 3 | Status: SHIPPED | OUTPATIENT
Start: 2022-03-14

## 2022-03-14 RX ORDER — ATORVASTATIN CALCIUM 40 MG/1
TABLET, FILM COATED ORAL
Qty: 30 TABLET | Refills: 3 | Status: SHIPPED | OUTPATIENT
Start: 2022-03-14 | End: 2022-06-20

## 2022-03-14 NOTE — TELEPHONE ENCOUNTER
Medication:   Requested Prescriptions     Pending Prescriptions Disp Refills    Vitamin D (CHOLECALCIFEROL) 25 MCG (1000 UT) TABS tablet [Pharmacy Med Name: VITAMIN D3 1,000 UNIT TABLET] 60 tablet 1     Sig: TAKE 2 TABLETS BY MOUTH EVERY DAY    carvedilol (COREG) 3.125 MG tablet [Pharmacy Med Name: CARVEDILOL 3.125 MG TABLET] 60 tablet 1     Sig: TAKE 1 TABLET BY MOUTH TWICE A DAY WITH MEALS    amLODIPine (NORVASC) 10 MG tablet [Pharmacy Med Name: AMLODIPINE BESYLATE 10 MG TAB] 30 tablet 1     Sig: TAKE 1 TABLET BY MOUTH EVERY DAY    atorvastatin (LIPITOR) 40 MG tablet [Pharmacy Med Name: ATORVASTATIN 40 MG TABLET] 30 tablet 1     Sig: TAKE 1 TABLET BY MOUTH EVERY DAY        Last Filled:      Patient Phone Number: [phone] (home)     Last appt: 2/16/2022   Next appt: 4/18/2022    Last OARRS: No flowsheet data found.

## 2022-05-18 ENCOUNTER — OFFICE VISIT (OUTPATIENT)
Dept: PRIMARY CARE CLINIC | Age: 87
End: 2022-05-18
Payer: MEDICARE

## 2022-05-18 VITALS
RESPIRATION RATE: 16 BRPM | TEMPERATURE: 97.3 F | WEIGHT: 134 LBS | BODY MASS INDEX: 26.31 KG/M2 | SYSTOLIC BLOOD PRESSURE: 130 MMHG | HEART RATE: 60 BPM | DIASTOLIC BLOOD PRESSURE: 76 MMHG | HEIGHT: 60 IN | OXYGEN SATURATION: 93 %

## 2022-05-18 DIAGNOSIS — I10 HTN (HYPERTENSION), BENIGN: ICD-10-CM

## 2022-05-18 DIAGNOSIS — G45.9 TIA (TRANSIENT ISCHEMIC ATTACK): Primary | ICD-10-CM

## 2022-05-18 DIAGNOSIS — I25.10 CORONARY ARTERY DISEASE INVOLVING NATIVE HEART WITHOUT ANGINA PECTORIS, UNSPECIFIED VESSEL OR LESION TYPE: ICD-10-CM

## 2022-05-18 PROCEDURE — 99214 OFFICE O/P EST MOD 30 MIN: CPT | Performed by: FAMILY MEDICINE

## 2022-05-18 ASSESSMENT — ENCOUNTER SYMPTOMS
ABDOMINAL PAIN: 0
VOMITING: 0
SORE THROAT: 0
COUGH: 1
NAUSEA: 0
BLOOD IN STOOL: 0
SHORTNESS OF BREATH: 0

## 2022-05-18 NOTE — PROGRESS NOTES
Chief Complaint   Patient presents with    Hypertension     pt here for follow up    Other     pt had minor TIA on 5/11/2022 in [facility] [place] is a 80 y.o. female who presents for hospital followup visit. Pt was on vacation in [facility] and woke up unable to speak and 911 was called and pt was sent to the ER and by arrival had clinically improved and pt was Dx with a TIA. Of note pt did run out of her ASA medication about 1 month ago. Pt apparently had an MRI brain done which showed no acute CVA along with an EEG which was negative for seizure    Pt has a hx of HTN, CAD and dyslipidemia and is on norvasc, lipitor and coreg medications. Pt has been eating a very healthy mediterranean diet       Pt did not do COVID vaccine yet     Pt has a hx of Alzheimer's dementia and used to be on aricept medication in the past     Pt is a nonsmoker and denies alcohol use      Review of Systems   Constitutional: Negative for fatigue and fever. HENT: Negative for nosebleeds and sore throat. Eyes:        Negative blurred vision or diplopia   Respiratory: Positive for cough (dry at times). Negative for shortness of breath. Cardiovascular: Negative for chest pain, palpitations and leg swelling. Gastrointestinal: Negative for abdominal pain, blood in stool, nausea and vomiting. Negative melena or indigestion   Endocrine: Negative for polydipsia and polyuria. Genitourinary: Negative for dysuria and hematuria. Musculoskeletal: Negative for arthralgias. Skin: Negative for rash. Neurological: Negative for dizziness, seizures, syncope, speech difficulty, weakness and headaches. Psychiatric/Behavioral: Negative for dysphoric mood. The patient is not nervous/anxious. Vitals:    05/18/22 1156   BP: 130/76   Pulse: 60   Resp: 16   Temp: 97.3 °F (36.3 °C)   SpO2: 93%         Physical Exam  Vitals reviewed. Constitutional:       General: She is not in acute distress.   HENT: Mouth/Throat:      Mouth: Mucous membranes are moist.      Pharynx: Oropharynx is clear. Eyes:      General: No scleral icterus. Comments: Pink conjunctivae    Neck:      Thyroid: No thyromegaly. Cardiovascular:      Heart sounds: Normal heart sounds. No murmur heard. No friction rub. No gallop. Pulmonary:      Effort: Pulmonary effort is normal.      Breath sounds: Normal breath sounds. Abdominal:      Palpations: Abdomen is soft. Tenderness: There is no abdominal tenderness. Musculoskeletal:      Comments: No leg edema noted B/L   Lymphadenopathy:      Cervical: No cervical adenopathy. Skin:     Findings: No rash. Comments:      Neurological:      Mental Status: She is alert. Comments: Cranial nerves 2 - 12 grossly intact; Muscle strength 5/5 throughout   Psychiatric:         Mood and Affect: Mood normal.         ASSESSMENT AND PLAN    1. TIA (transient ischemic attack)  Pt clinically stable and is with a nonfocal neuro exam and her grandson was stressed the importance of compliance with her ASA medication to prevent a reoccurrence! 2. Coronary artery disease involving native heart without angina pectoris, unspecified vessel or lesion type  Patient clinically stable on present medications and denies any CP or SOB    3. HTN (hypertension), benign  Patient clinically stable on present medications        Radha Paulson MD      Return in about 3 months (around 8/18/2022). Patient Instructions       Patient Education      Go to the ER ASAP if you develop any chest pain, shortness of breath, facial droop, slurred speech, weakness/numbness in your arms or legs or double vision! Transient Ischemic Attack: Care Instructions  Overview     A transient ischemic attack (TIA) is when blood flow to a part of your brain is blocked for a short time. A TIA causes stroke symptoms that can last for at least a few minutes.  Stroke symptoms include sudden weakness or loss of movement in a part of your body, confusion, vision changes, trouble speaking, and trouble walking or balancing. But unlike a stroke, a TIA doesn't causelasting brain damage. TIAs are often warning signs of a stroke. Some people who have a TIA may have a stroke in the future. If you have symptoms of a stroke, call for emergency help right away. Quick treatment can help limit damage to the brain and increase thechance of recovery. You can take steps to help prevent a stroke. These steps include managing health problems that raise your risk, taking medicine that prevents blood clots, and having a heart-healthy lifestyle. This lifestyle includes beingactive, eating healthy foods, staying at a healthy weight, and not smoking. Follow-up care is a key part of your treatment and safety. Be sure to make and go to all appointments, and call your doctor if you are having problems. It's also a good idea to know your test results and keep alist of the medicines you take. How can you care for yourself at home? Medicines     Be safe with medicines. Take your medicines exactly as prescribed. Call your doctor if you think you are having a problem with your medicine.      If you take a blood thinner, such as aspirin, be sure you get instructions about how to take your medicine safely. Blood thinners can cause serious bleeding problems.      Call your doctor if you are not able to take your medicines for any reason.      Do not take any over-the-counter medicines or herbal products without talking to your doctor first.      If you use hormonal birth control or hormone therapy, talk to your doctor. Ask if these are right for you. They may raise the risk of stroke in some people. Heart-healthy lifestyle     Do not smoke. If you need help quitting, talk to your doctor about stop-smoking programs and medicines.      Be active. If your doctor recommends it, get more exercise. Walking is a good choice.  Bit by bit, increase the amount you walk every day. Try for at least 30 minutes on most days of the week. You also may want to swim, bike, or do other activities.      Eat heart-healthy foods. These include vegetables, fruits, nuts, beans, lean meat, fish, and whole grains. Limit sodium and sugar.      Stay at a healthy weight. Lose weight if you need to.      Limit alcohol to 2 drinks a day for men and 1 drink a day for women. Staying healthy     Manage other health problems that raise your risk of stroke. These include atrial fibrillation, diabetes, high blood pressure, and high cholesterol.      If you think you may have a problem with alcohol or drug use, talk to your doctor.      Get the flu vaccine every year. When should you call for help? Call 911 anytime you think you may need emergency care. For example, call if:     You have symptoms of a stroke. These may include:  ? Sudden numbness, tingling, weakness, or loss of movement in your face, arm, or leg, especially on only one side of your body. ? Sudden vision changes. ? Sudden trouble speaking. ? Sudden confusion or trouble understanding simple statements. ? Sudden problems with walking or balance. ? A sudden, severe headache that is different from past headaches. Call 911 even if these symptoms go away in a few minutes.      You feel like you are having another TIA. Watch closely for changes in your health, and be sure to contact your doctor ifyou have any problems. Where can you learn more? Go to https://gino.[employer]. org and sign in to your [employer] account. Enter [phone] in the [facility] box to learn more about \"Transient Ischemic Attack: Care Instructions. \"     If you do not have an account, please click on the \"Sign Up Now\" link. Current as of: July 6, 2021               Content Version: 13.2  © 3222-1686 [employer]. Care instructions adapted under license by [facility] [address]  If you have questions about a medical condition or this instruction, always ask your healthcare professional. [place] [address] any warranty or liability for your use of this information.

## 2022-05-18 NOTE — PATIENT INSTRUCTIONS
Patient Education      Go to the ER ASAP if you develop any chest pain, shortness of breath, facial droop, slurred speech, weakness/numbness in your arms or legs or double vision! Transient Ischemic Attack: Care Instructions  Overview     A transient ischemic attack (TIA) is when blood flow to a part of your brain is blocked for a short time. A TIA causes stroke symptoms that can last for at least a few minutes. Stroke symptoms include sudden weakness or loss of movement in a part of your body, confusion, vision changes, trouble speaking, and trouble walking or balancing. But unlike a stroke, a TIA doesn't causelasting brain damage. TIAs are often warning signs of a stroke. Some people who have a TIA may have a stroke in the future. If you have symptoms of a stroke, call for emergency help right away. Quick treatment can help limit damage to the brain and increase thechance of recovery. You can take steps to help prevent a stroke. These steps include managing health problems that raise your risk, taking medicine that prevents blood clots, and having a heart-healthy lifestyle. This lifestyle includes beingactive, eating healthy foods, staying at a healthy weight, and not smoking. Follow-up care is a key part of your treatment and safety. Be sure to make and go to all appointments, and call your doctor if you are having problems. It's also a good idea to know your test results and keep alist of the medicines you take. How can you care for yourself at home? Medicines     Be safe with medicines. Take your medicines exactly as prescribed. Call your doctor if you think you are having a problem with your medicine.      If you take a blood thinner, such as aspirin, be sure you get instructions about how to take your medicine safely.  Blood thinners can cause serious bleeding problems.      Call your doctor if you are not able to take your medicines for any reason.      Do not take any over-the-counter medicines or herbal products without talking to your doctor first.      If you use hormonal birth control or hormone therapy, talk to your doctor. Ask if these are right for you. They may raise the risk of stroke in some people. Heart-healthy lifestyle     Do not smoke. If you need help quitting, talk to your doctor about stop-smoking programs and medicines.      Be active. If your doctor recommends it, get more exercise. Walking is a good choice. Bit by bit, increase the amount you walk every day. Try for at least 30 minutes on most days of the week. You also may want to swim, bike, or do other activities.      Eat heart-healthy foods. These include vegetables, fruits, nuts, beans, lean meat, fish, and whole grains. Limit sodium and sugar.      Stay at a healthy weight. Lose weight if you need to.      Limit alcohol to 2 drinks a day for men and 1 drink a day for women. Staying healthy     Manage other health problems that raise your risk of stroke. These include atrial fibrillation, diabetes, high blood pressure, and high cholesterol.      If you think you may have a problem with alcohol or drug use, talk to your doctor.      Get the flu vaccine every year. When should you call for help? Call 911 anytime you think you may need emergency care. For example, call if:     You have symptoms of a stroke. These may include:  ? Sudden numbness, tingling, weakness, or loss of movement in your face, arm, or leg, especially on only one side of your body. ? Sudden vision changes. ? Sudden trouble speaking. ? Sudden confusion or trouble understanding simple statements. ? Sudden problems with walking or balance. ? A sudden, severe headache that is different from past headaches. Call 911 even if these symptoms go away in a few minutes.      You feel like you are having another TIA. Watch closely for changes in your health, and be sure to contact your doctor ifyou have any problems.   Where can you learn more?  Go to [email or web address] and sign in to your [employer] account. Enter [phone] in the Neteven box to learn more about \"Transient Ischemic Attack: Care Instructions. \"     If you do not have an account, please click on the \"Sign Up Now\" link. Current as of: July 6, 2021               Content Version: 13.2  © 2006-2022 Healthwise, Incorporated. Care instructions adapted under license by [facility] ([facility]). If you have questions about a medical condition or this instruction, always ask your healthcare professional. [address] any warranty or liability for your use of this information.

## 2022-06-20 DIAGNOSIS — E78.5 DYSLIPIDEMIA: ICD-10-CM

## 2022-06-20 DIAGNOSIS — I10 HTN (HYPERTENSION), BENIGN: ICD-10-CM

## 2022-06-20 RX ORDER — CARVEDILOL 3.12 MG/1
TABLET ORAL
Qty: 180 TABLET | Refills: 0 | Status: SHIPPED | OUTPATIENT
Start: 2022-06-20 | End: 2022-09-29

## 2022-06-20 RX ORDER — AMLODIPINE BESYLATE 10 MG/1
TABLET ORAL
Qty: 90 TABLET | Refills: 0 | Status: SHIPPED | OUTPATIENT
Start: 2022-06-20 | End: 2022-09-29

## 2022-06-20 RX ORDER — ATORVASTATIN CALCIUM 40 MG/1
TABLET, FILM COATED ORAL
Qty: 90 TABLET | Refills: 0 | Status: SHIPPED | OUTPATIENT
Start: 2022-06-20 | End: 2022-09-29

## 2022-06-20 NOTE — TELEPHONE ENCOUNTER
Medication:   Requested Prescriptions     Pending Prescriptions Disp Refills    carvedilol (COREG) 3.125 MG tablet [Pharmacy Med Name: CARVEDILOL 3.125 MG TABLET] 180 tablet 1     Sig: TAKE 1 TABLET BY MOUTH TWICE A DAY WITH MEALS    atorvastatin (LIPITOR) 40 MG tablet [Pharmacy Med Name: ATORVASTATIN 40 MG TABLET] 90 tablet 1     Sig: TAKE 1 TABLET BY MOUTH EVERY DAY    amLODIPine (NORVASC) 10 MG tablet [Pharmacy Med Name: AMLODIPINE BESYLATE 10 MG TAB] 90 tablet 1     Sig: TAKE 1 TABLET BY MOUTH EVERY DAY        Last Filled:  03/14/2022 with 3 refills     Patient Phone Number: [phone] (home)     Last appt: 5/18/2022   Next appt: 8/17/2022    Last OARRS: No flowsheet data found.

## 2022-08-18 ENCOUNTER — OFFICE VISIT (OUTPATIENT)
Dept: PRIMARY CARE CLINIC | Age: 87
End: 2022-08-18
Payer: MEDICARE

## 2022-08-18 VITALS
WEIGHT: 127 LBS | DIASTOLIC BLOOD PRESSURE: 82 MMHG | TEMPERATURE: 98.2 F | RESPIRATION RATE: 14 BRPM | HEART RATE: 68 BPM | HEIGHT: 60 IN | OXYGEN SATURATION: 98 % | BODY MASS INDEX: 24.94 KG/M2 | SYSTOLIC BLOOD PRESSURE: 138 MMHG

## 2022-08-18 DIAGNOSIS — E78.5 DYSLIPIDEMIA: ICD-10-CM

## 2022-08-18 DIAGNOSIS — I10 HTN (HYPERTENSION), BENIGN: Primary | ICD-10-CM

## 2022-08-18 DIAGNOSIS — I25.10 CORONARY ARTERY DISEASE INVOLVING NATIVE HEART WITHOUT ANGINA PECTORIS, UNSPECIFIED VESSEL OR LESION TYPE: ICD-10-CM

## 2022-08-18 DIAGNOSIS — G45.9 TIA (TRANSIENT ISCHEMIC ATTACK): ICD-10-CM

## 2022-08-18 PROCEDURE — 1123F ACP DISCUSS/DSCN MKR DOCD: CPT | Performed by: FAMILY MEDICINE

## 2022-08-18 PROCEDURE — 99214 OFFICE O/P EST MOD 30 MIN: CPT | Performed by: FAMILY MEDICINE

## 2022-08-18 ASSESSMENT — ENCOUNTER SYMPTOMS
BLOOD IN STOOL: 0
ABDOMINAL PAIN: 0
VOMITING: 0
SHORTNESS OF BREATH: 0
NAUSEA: 0
COUGH: 1
SORE THROAT: 0

## 2022-08-18 NOTE — PROGRESS NOTES
Normal breath sounds. Abdominal:      Palpations: Abdomen is soft. Tenderness: There is no abdominal tenderness. Musculoskeletal:      Comments: No leg edema noted B/L   Lymphadenopathy:      Cervical: No cervical adenopathy. Skin:     Findings: No rash. Comments:      Neurological:      Mental Status: She is alert. Comments: Cranial nerves 2 - 12 grossly intact; Muscle strength 5/5 throughout   Psychiatric:         Mood and Affect: Mood normal.       ASSESSMENT AND PLAN    1. HTN (hypertension), benign  Patient clinically stable on present medications    2. Dyslipidemia/TIA (transient ischemic attack)  Patient clinically stable on present medications and is with a nonfocal neuro exam    4. Coronary artery disease involving native heart without angina pectoris, unspecified vessel or lesion type  Patient clinically stable on present medications and denies any CP or SOB      Sudheer Shoemaker MD      Return in about 3 months (around 11/18/2022). Patient Instructions     Go to the ER ASAP if you develop any chest pain, shortness of breath, facial droop, slurred speech, weakness/numbness in your arms or legs or double vision!

## 2022-09-29 DIAGNOSIS — E78.5 DYSLIPIDEMIA: ICD-10-CM

## 2022-09-29 DIAGNOSIS — I10 HTN (HYPERTENSION), BENIGN: ICD-10-CM

## 2022-09-29 RX ORDER — CARVEDILOL 3.12 MG/1
TABLET ORAL
Qty: 180 TABLET | Refills: 1 | Status: SHIPPED | OUTPATIENT
Start: 2022-09-29

## 2022-09-29 RX ORDER — AMLODIPINE BESYLATE 10 MG/1
TABLET ORAL
Qty: 90 TABLET | Refills: 1 | Status: SHIPPED | OUTPATIENT
Start: 2022-09-29

## 2022-09-29 RX ORDER — ATORVASTATIN CALCIUM 40 MG/1
TABLET, FILM COATED ORAL
Qty: 90 TABLET | Refills: 1 | Status: SHIPPED | OUTPATIENT
Start: 2022-09-29

## 2022-09-29 NOTE — TELEPHONE ENCOUNTER
Medication:   Requested Prescriptions     Pending Prescriptions Disp Refills    atorvastatin (LIPITOR) 40 MG tablet [Pharmacy Med Name: ATORVASTATIN 40 MG TABLET] 90 tablet 0     Sig: TAKE 1 TABLET BY MOUTH EVERY DAY    carvedilol (COREG) 3.125 MG tablet [Pharmacy Med Name: CARVEDILOL 3.125 MG TABLET] 180 tablet 0     Sig: TAKE 1 TABLET BY MOUTH TWICE A DAY WITH MEALS    amLODIPine (NORVASC) 10 MG tablet [Pharmacy Med Name: AMLODIPINE BESYLATE 10 MG TAB] 90 tablet 0     Sig: TAKE 1 TABLET BY MOUTH EVERY DAY       Last Filled:  Atorvastatin: 6/20/2022 #90 with no refills  Carvedilol: 06/20/2022 #180 with no refills   Amlodipine: 06/20/2022 #90 with no refills     Patient Phone Number: [phone] (home)     Last appt: 8/18/2022   Next appt: 11/21/2022    Last Labs DM:   Lab Results   Component Value Date/Time    LABA1C 5.5 11/18/2021 04:33 AM     Last Lipid:   Lab Results   Component Value Date/Time    CHOL 117 11/18/2021 04:33 AM    TRIG 56 11/18/2021 04:33 AM    HDL 45 11/18/2021 04:33 AM    LDLCALC 61 11/18/2021 04:33 AM     Last PSA: No results found for: PSA  Last Thyroid:   Lab Results   Component Value Date/Time    TSH 2.25 11/17/2021 04:11 PM

## 2022-10-01 ENCOUNTER — HOSPITAL ENCOUNTER (EMERGENCY)
Age: 87
Discharge: HOME | End: 2022-10-02
Payer: MEDICARE

## 2022-10-01 VITALS
OXYGEN SATURATION: 97 % | RESPIRATION RATE: 16 BRPM | SYSTOLIC BLOOD PRESSURE: 133 MMHG | BODY MASS INDEX: 24.1 KG/M2 | WEIGHT: 136 LBS | HEIGHT: 63 IN | HEART RATE: 82 BPM | TEMPERATURE: 98.5 F | DIASTOLIC BLOOD PRESSURE: 52 MMHG
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
## 2022-10-01 DIAGNOSIS — M25.561 ACUTE PAIN OF RIGHT KNEE: ICD-10-CM

## 2022-10-01 DIAGNOSIS — S09.90XA CLOSED HEAD INJURY, INITIAL ENCOUNTER: Primary | ICD-10-CM

## 2022-10-01 PROCEDURE — 99284 EMERGENCY DEPT VISIT MOD MDM: CPT

## 2022-10-01 ASSESSMENT — PAIN DESCRIPTION - FREQUENCY: FREQUENCY: CONTINUOUS

## 2022-10-01 ASSESSMENT — PAIN DESCRIPTION - ONSET: ONSET: GRADUAL

## 2022-10-01 ASSESSMENT — LIFESTYLE VARIABLES
HOW MANY STANDARD DRINKS CONTAINING ALCOHOL DO YOU HAVE ON A TYPICAL DAY: PATIENT DOES NOT DRINK
HOW OFTEN DO YOU HAVE A DRINK CONTAINING ALCOHOL: NEVER

## 2022-10-01 ASSESSMENT — PAIN - FUNCTIONAL ASSESSMENT: PAIN_FUNCTIONAL_ASSESSMENT: 0-10

## 2022-10-01 ASSESSMENT — PAIN DESCRIPTION - DESCRIPTORS: DESCRIPTORS: ACHING

## 2022-10-01 ASSESSMENT — PAIN SCALES - GENERAL: PAINLEVEL_OUTOF10: 3

## 2022-10-01 ASSESSMENT — PAIN DESCRIPTION - ORIENTATION: ORIENTATION: RIGHT

## 2022-10-01 ASSESSMENT — PAIN DESCRIPTION - LOCATION: LOCATION: LEG

## 2022-10-01 ASSESSMENT — PAIN DESCRIPTION - PAIN TYPE: TYPE: ACUTE PAIN

## 2022-10-02 ENCOUNTER — APPOINTMENT (OUTPATIENT)
Dept: CT IMAGING | Age: 87
End: 2022-10-02
Payer: MEDICARE

## 2022-10-02 ENCOUNTER — APPOINTMENT (OUTPATIENT)
Dept: GENERAL RADIOLOGY | Age: 87
End: 2022-10-02
Payer: MEDICARE

## 2022-10-02 PROCEDURE — 70450 CT HEAD/BRAIN W/O DYE: CPT

## 2022-10-02 PROCEDURE — 73562 X-RAY EXAM OF KNEE 3: CPT

## 2022-10-02 PROCEDURE — 72125 CT NECK SPINE W/O DYE: CPT

## 2022-10-02 ASSESSMENT — ENCOUNTER SYMPTOMS
NAUSEA: 0
SORE THROAT: 0
SHORTNESS OF BREATH: 0
CONSTIPATION: 0
COUGH: 0
VOMITING: 0
EYE PAIN: 0
DIARRHEA: 0
RHINORRHEA: 0
ABDOMINAL PAIN: 0
BACK PAIN: 0

## 2022-10-02 NOTE — ED NOTES
Discharge and education instructions reviewed. Patient verbalized understanding, teach-back successful. Patient denied questions at this time. No acute distress noted. Patient instructed to follow-up as noted - return to emergency department if symptoms worsen. Patient verbalized understanding. Discharged per EDMD with discharged instructions.      Debria Curling, RN  10/02/22 4992

## 2022-10-02 NOTE — ED PROVIDER NOTES
[facility]        Pt Name: Anneliese Harper  MRN: [id number]  [place] 11/8/1930  Date of evaluation: 10/1/2022  Provider: MAURILIO Mccabe  PCP: Ana María De Santiago MD  Note Started: 12:35 AM EDT       ALEJO. I have evaluated this patient. My supervising physician was available for consultation. CHIEF COMPLAINT       Chief Complaint   Patient presents with    Fall     From home. Family states PT tripped over carpet and fell around 2100. PT states she hit right side of head above eyebrow and right upper leg. Denies LOC/dizziness. HISTORY OF PRESENT ILLNESS   (Location, Timing/Onset, Context/Setting, Quality, Duration, Modifying Factors, Severity, Associated Signs and Symptoms)  Note limiting factors. Chief Complaint: Fall, head injury    Anneliese Harper is a 80 y.o. female who presents to the emergency department with her grandson due to fall. Luda Cocks states that the patient tripped over carpet that was outside causing her to fall over hitting her right knee and then hitting her head on the right side temple area. Patient's grandson states that the patient has been acting normal since the fall and even went out to dinner shortly after the fall. He states that this fall occurred around 1900 contrary to what the nurses notes say. Patient's grandson states that she has been walking normally and talking normally and acting her normal self. Due to the fall the patient's grandson wanted to have the patient checked out. Nursing Notes were all reviewed and agreed with or any disagreements were addressed in the HPI. REVIEW OF SYSTEMS    (2-9 systems for level 4, 10 or more for level 5)     Review of Systems   Constitutional:  Negative for chills, diaphoresis and fever. HENT:  Negative for congestion, rhinorrhea and sore throat. Eyes:  Negative for pain and visual disturbance.    Respiratory:  Negative for cough and shortness of breath. Cardiovascular:  Negative for chest pain and leg swelling. Gastrointestinal:  Negative for abdominal pain, constipation, diarrhea, nausea and vomiting. Genitourinary:  Negative for difficulty urinating, dysuria and frequency. Musculoskeletal:  Positive for gait problem. Negative for back pain and neck pain. Right knee pain   Skin:  Negative for rash and wound. Neurological:  Negative for dizziness and light-headedness. Positives and Pertinent negatives as per HPI. Except as noted above in the ROS, all other systems were reviewed and negative. PAST MEDICAL HISTORY   History reviewed. No pertinent past medical history. SURGICAL HISTORY   History reviewed. No pertinent surgical history. CURRENTMEDICATIONS       Discharge Medication List as of 10/2/2022  2:33 AM        CONTINUE these medications which have NOT CHANGED    Details   atorvastatin (LIPITOR) 40 MG tablet TAKE 1 TABLET BY MOUTH EVERY DAY, Disp-90 tablet, R-1Normal      carvedilol (COREG) 3.125 MG tablet TAKE 1 TABLET BY MOUTH TWICE A DAY WITH MEALS, Disp-180 tablet, R-1Normal      amLODIPine (NORVASC) 10 MG tablet TAKE 1 TABLET BY MOUTH EVERY DAY, Disp-90 tablet, R-1Normal      Vitamin D (CHOLECALCIFEROL) 25 MCG (1000 UT) TABS tablet TAKE 2 TABLETS BY MOUTH EVERY DAY, Disp-60 tablet, R-3Normal      aspirin (ECOTRIN LOW STRENGTH) 81 MG EC tablet Take 1 tablet by mouth daily, Disp-30 tablet, R-3Normal               ALLERGIES     Prochlorperazine    FAMILYHISTORY     History reviewed. No pertinent family history. SOCIAL HISTORY       Social History     Tobacco Use    Smoking status: Never     Passive exposure: Never    Smokeless tobacco: Never   Vaping Use    Vaping Use: Never used   Substance Use Topics    Alcohol use: Not Currently    Drug use: Never       SCREENINGS   NIH Stroke Scale  Interval: Baseline  Level of Consciousness (1a): Alert  LOC Questions (1b):  Answers both correctly  LOC Commands (1c): Performs both tasks correctly  Best Gaze (2): Normal  Visual (3): No visual loss  Facial Palsy (4): Normal symmetrical movement  Motor Arm, Left (5a): No drift  Motor Arm, Right (5b): No drift  Motor Leg, Left (6a): No drift  Motor Leg, Right (6b): No drift  Limb Ataxia (7): Absent  Sensory (8): Normal  Best Language (9): No aphasia  Dysarthria (10): Normal  Extinction and Inattention (11): No abnormality  Total: 0Glasgow Coma Scale  Eye Opening: Spontaneous  Best Verbal Response: Oriented  Best Motor Response: Obeys commands  Lis Coma Scale Score: 15        PHYSICAL EXAM    (up to 7 for level 4, 8 or more for level 5)     ED Triage Vitals [10/01/22 2312]   BP Temp Temp Source Heart Rate Resp SpO2 Height Weight   (!) 133/52 98.5 °F (36.9 °C) Oral 82 16 97 % 5' 3\" (1.6 m) 136 lb (61.7 kg)       Physical Exam  Vitals and nursing note reviewed. Constitutional:       General: She is not in acute distress. Appearance: She is normal weight. She is not ill-appearing. HENT:      Head: Normocephalic. Comments: Small contusion of the right temporal area of scalp     Nose: Nose normal. No congestion or rhinorrhea. Eyes:      General: No scleral icterus. Right eye: No discharge. Left eye: No discharge. Extraocular Movements: Extraocular movements intact. Conjunctiva/sclera: Conjunctivae normal.      Pupils: Pupils are equal, round, and reactive to light. Cardiovascular:      Rate and Rhythm: Normal rate and regular rhythm. Pulses: Normal pulses. Heart sounds: Normal heart sounds. No murmur heard. No friction rub. No gallop. Pulmonary:      Effort: Pulmonary effort is normal. No respiratory distress. Breath sounds: Normal breath sounds. No stridor. No wheezing. Abdominal:      General: Bowel sounds are normal. There is no distension. Palpations: There is no mass. Tenderness: There is no abdominal tenderness.    Musculoskeletal:      Cervical back: Normal range of motion and neck supple. No rigidity or tenderness. Right lower leg: No edema. Left lower leg: No edema. Comments: Lateral right knee pain on palpitation and over the proximal fibula. Neurological:      Mental Status: She is alert and oriented to person, place, and time. GCS: GCS eye subscore is 4. GCS verbal subscore is 5. GCS motor subscore is 6. Cranial Nerves: Cranial nerves 2-12 are intact. Sensory: Sensation is intact. Motor: Motor function is intact. Coordination: Finger-Nose-Finger Test normal.   Psychiatric:         Mood and Affect: Mood normal.         Behavior: Behavior normal.       DIAGNOSTIC RESULTS   LABS:    Labs Reviewed - No data to display    When ordered only abnormal lab results are displayed. All other labs were within normal range or not returned as of this dictation. EKG: When ordered, EKG's are interpreted by the Emergency Department Physician in the absence of a cardiologist.  Please see their note for interpretation of EKG. RADIOLOGY:   Non-plain film images such as CT, Ultrasound and MRI are read by the radiologist. 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 No acute osseous fracture. CT HEAD WO CONTRAST   Final Result   No CT evidence of an acute intracranial abnormality. No evidence of acute fracture or traumatic malalignment of the cervical spine. Chronic and age related changes in the head and cervical spine, as noted   above. CT CERVICAL SPINE WO CONTRAST   Final Result   No CT evidence of an acute intracranial abnormality. No evidence of acute fracture or traumatic malalignment of the cervical spine.       Chronic and age related changes in the head and cervical spine, as noted   above. No results found. PROCEDURES   Unless otherwise noted below, none     Procedures    CRITICAL CARE TIME       CONSULTS:  None      EMERGENCY DEPARTMENT COURSE and DIFFERENTIAL DIAGNOSIS/MDM:   Vitals:    Vitals:    10/01/22 2312   BP: (!) 133/52   Pulse: 82   Resp: 16   Temp: 98.5 °F (36.9 °C)   TempSrc: Oral   SpO2: 97%   Weight: 136 lb (61.7 kg)   Height: 5' 3\" (1.6 m)       Patient was given the following medications:  Medications - No data to display      Is this patient to be included in the SEP-1 Core Measure due to severe sepsis or septic shock? No   Exclusion criteria - the patient is NOT to be included for SEP-1 Core Measure due to: Infection is not suspected    60-year-old female presents emerged part with her grandson due to complaint of tripping over carpet falling on her right knee and hitting her head. Patient has a reassuring neuro exam without any acute neurological deficits. Patient otherwise appears well in no acute distress she is complaining of lateral right knee pain. Due to the nature of the patient's complaints and fall CT scan of the head and neck were ordered did not show any acute abnormalities. X-ray of the right knee did not show any osseous abnormalities. Patient was given a knee brace to be used for stability and is encouraged to follow-up with primary care doctor next week for recheck. At this time I have low suspicion for pneumonia, pertussis, hemothorax, pneumothorax, pericarditis, myocarditis, tension pneumothorax, influenza, COVID-19, pulmonary embolism, Aortic dissection, AAA or other acute emergency pathologies. FINAL IMPRESSION      1. Closed head injury, initial encounter    2. Fall, initial encounter    3.  Acute pain of right knee          DISPOSITION/PLAN   DISPOSITION Decision To Discharge 10/02/2022 01:52:42 AM      PATIENT REFERRED TO:  Damian Borjas MD  [address] [facility]  [phone]    Schedule an appointment as soon as possible for a visit in 1 week  recheck    [facility]  [address] [facility]  [phone]    As needed, If symptoms worsen    Raegan Singh MD  [address] [place]  [phone]    Schedule an appointment as soon as possible for a visit on 10/3/2022  recheck    DISCHARGE MEDICATIONS:  Discharge Medication List as of 10/2/2022  2:33 AM          DISCONTINUED MEDICATIONS:  Discharge Medication List as of 10/2/2022  2:33 AM                 (Please note that portions of this note were completed with a voice recognition program.  Efforts were made to edit the dictations but occasionally words are mis-transcribed.)    MAURILIO Pack (electronically signed)            Kristen Hauser, [address]  10/02/22 [id number]

## 2022-11-14 ENCOUNTER — OFFICE VISIT (OUTPATIENT)
Dept: PRIMARY CARE CLINIC | Age: 87
End: 2022-11-14
Payer: MEDICARE

## 2022-11-14 VITALS
TEMPERATURE: 97.1 F | HEIGHT: 63 IN | OXYGEN SATURATION: 96 % | BODY MASS INDEX: 24.8 KG/M2 | HEART RATE: 67 BPM | SYSTOLIC BLOOD PRESSURE: 152 MMHG | WEIGHT: 140 LBS | RESPIRATION RATE: 16 BRPM | DIASTOLIC BLOOD PRESSURE: 88 MMHG

## 2022-11-14 DIAGNOSIS — G45.9 TIA (TRANSIENT ISCHEMIC ATTACK): ICD-10-CM

## 2022-11-14 DIAGNOSIS — I10 HTN (HYPERTENSION), BENIGN: ICD-10-CM

## 2022-11-14 DIAGNOSIS — I25.10 CORONARY ARTERY DISEASE INVOLVING NATIVE HEART WITHOUT ANGINA PECTORIS, UNSPECIFIED VESSEL OR LESION TYPE: Primary | ICD-10-CM

## 2022-11-14 DIAGNOSIS — E78.5 DYSLIPIDEMIA: ICD-10-CM

## 2022-11-14 PROCEDURE — 99214 OFFICE O/P EST MOD 30 MIN: CPT | Performed by: FAMILY MEDICINE

## 2022-11-14 PROCEDURE — 1123F ACP DISCUSS/DSCN MKR DOCD: CPT | Performed by: FAMILY MEDICINE

## 2022-11-14 SDOH — ECONOMIC STABILITY: FOOD INSECURITY: WITHIN THE PAST 12 MONTHS, YOU WORRIED THAT YOUR FOOD WOULD RUN OUT BEFORE YOU GOT MONEY TO BUY MORE.: NEVER TRUE

## 2022-11-14 SDOH — ECONOMIC STABILITY: FOOD INSECURITY: WITHIN THE PAST 12 MONTHS, THE FOOD YOU BOUGHT JUST DIDN'T LAST AND YOU DIDN'T HAVE MONEY TO GET MORE.: NEVER TRUE

## 2022-11-14 ASSESSMENT — ENCOUNTER SYMPTOMS
COUGH: 1
VOMITING: 0
BLOOD IN STOOL: 0
SORE THROAT: 0
ABDOMINAL PAIN: 0
SHORTNESS OF BREATH: 0
NAUSEA: 0

## 2022-11-14 ASSESSMENT — SOCIAL DETERMINANTS OF HEALTH (SDOH): HOW HARD IS IT FOR YOU TO PAY FOR THE VERY BASICS LIKE FOOD, HOUSING, MEDICAL CARE, AND HEATING?: NOT HARD AT ALL

## 2022-11-14 NOTE — PROGRESS NOTES
Chief Complaint   Patient presents with    Follow-up     Pt is here for her 3 month f/u. Reji Sender is a 80 y.o. female who presents for routine visit. Of note last month patient tripped over carpet and hit her R knee and head and went to the ER and had a CT scan head and neck which showed no fractures or subdural hematomas     Pt has a hx of HTN, CAD, dyslipidemia and TIA's and is on norvasc, lipitor and coreg medications along with a daily Baby ASA. Pt has been eating a very healthy mediterranean diet       Pt did not do COVID vaccine yet     Pt has a hx of Alzheimer's dementia and used to be on aricept medication in the past     Pt is a nonsmoker and denies alcohol use    Review of Systems   Constitutional:  Negative for fatigue and fever. HENT:  Negative for nosebleeds and sore throat. Eyes:         Negative blurred vision or diplopia   Respiratory:  Positive for cough (dry at times). Negative for shortness of breath. Cardiovascular:  Negative for chest pain, palpitations and leg swelling. Gastrointestinal:  Negative for abdominal pain, blood in stool, nausea and vomiting. Negative melena or indigestion   Endocrine: Negative for polydipsia and polyuria. Genitourinary:  Negative for dysuria and hematuria. Musculoskeletal:  Negative for arthralgias. Skin:  Negative for rash. Neurological:  Negative for dizziness, seizures, syncope, speech difficulty, weakness and headaches. Psychiatric/Behavioral:  Negative for dysphoric mood. The patient is not nervous/anxious. Vitals:    11/14/22 1441   BP: (!) 152/88   Pulse: 67   Resp: 16   Temp: 97.1 °F (36.2 °C)   SpO2: 96%     Pt did not take her BP pill yet today    Physical Exam  Vitals reviewed. Constitutional:       General: She is not in acute distress. HENT:      Mouth/Throat:      Mouth: Mucous membranes are moist.      Pharynx: Oropharynx is clear. Eyes:      General: No scleral icterus.      Comments: Pink conjunctivae    Neck:      Thyroid: No thyromegaly. Comments: No carotid bruits noted B/L  Cardiovascular:      Heart sounds: Normal heart sounds. No murmur heard. No friction rub. No gallop. Pulmonary:      Effort: Pulmonary effort is normal.      Breath sounds: Normal breath sounds. Abdominal:      Palpations: Abdomen is soft. Tenderness: There is no abdominal tenderness. Musculoskeletal:      Comments: No leg edema noted B/L   Lymphadenopathy:      Cervical: No cervical adenopathy. Skin:     Findings: No rash. Comments:      Neurological:      Mental Status: She is alert. Comments: Cranial nerves 2 - 12 grossly intact; Muscle strength 5/5 throughout   Psychiatric:         Mood and Affect: Mood normal.       ASSESSMENT AND PLAN    1. Coronary artery disease involving native heart without angina pectoris, unspecified vessel or lesion type  Patient clinically stable on present medications and denies any CP or SOB    2. HTN (hypertension), benign  Patient clinically stable on present medications    3. Dyslipidemia/TIA (transient ischemic attack)  Patient clinically stable on present medications and is with a nonfocal neuro exam       Sarah Mccain MD      Return in about 3 months (around 2/14/2023). Patient Instructions     Go to the ER ASAP if you develop any chest pain, shortness of breath, facial droop, slurred speech, weakness/numbness in your arms or legs or double vision!

## 2023-04-04 ENCOUNTER — OFFICE VISIT (OUTPATIENT)
Dept: PRIMARY CARE CLINIC | Age: 88
End: 2023-04-04
Payer: MEDICARE

## 2023-04-04 VITALS
OXYGEN SATURATION: 98 % | BODY MASS INDEX: 25.52 KG/M2 | DIASTOLIC BLOOD PRESSURE: 72 MMHG | SYSTOLIC BLOOD PRESSURE: 150 MMHG | HEART RATE: 71 BPM | TEMPERATURE: 96 F | RESPIRATION RATE: 16 BRPM | HEIGHT: 63 IN | WEIGHT: 144 LBS

## 2023-04-04 DIAGNOSIS — I25.10 CORONARY ARTERY DISEASE INVOLVING NATIVE HEART WITHOUT ANGINA PECTORIS, UNSPECIFIED VESSEL OR LESION TYPE: ICD-10-CM

## 2023-04-04 DIAGNOSIS — I10 HTN (HYPERTENSION), BENIGN: ICD-10-CM

## 2023-04-04 DIAGNOSIS — G45.9 TIA (TRANSIENT ISCHEMIC ATTACK): ICD-10-CM

## 2023-04-04 DIAGNOSIS — R35.0 FREQUENT URINATION: Primary | ICD-10-CM

## 2023-04-04 DIAGNOSIS — E78.5 DYSLIPIDEMIA: ICD-10-CM

## 2023-04-04 LAB
BACTERIA URNS QL MICRO: ABNORMAL /HPF
BILIRUB UR QL STRIP.AUTO: NEGATIVE
BILIRUBIN, POC: NORMAL
BLOOD URINE, POC: NORMAL
CLARITY UR: CLEAR
CLARITY, POC: CLEAR
COLOR UR: YELLOW
COLOR, POC: YELLOW
EPI CELLS #/AREA URNS AUTO: 4 /HPF (ref 0–5)
GLUCOSE UR STRIP.AUTO-MCNC: NEGATIVE MG/DL
GLUCOSE URINE, POC: NORMAL
HGB UR QL STRIP.AUTO: ABNORMAL
HYALINE CASTS #/AREA URNS AUTO: 0 /LPF (ref 0–8)
KETONES UR STRIP.AUTO-MCNC: NEGATIVE MG/DL
KETONES, POC: NORMAL
LEUKOCYTE EST, POC: NORMAL
LEUKOCYTE ESTERASE UR QL STRIP.AUTO: ABNORMAL
NITRITE UR QL STRIP.AUTO: NEGATIVE
NITRITE, POC: NORMAL
PH UR STRIP.AUTO: 6.5 [PH] (ref 5–8)
PH, POC: 7
PROT UR STRIP.AUTO-MCNC: 30 MG/DL
PROTEIN, POC: 30
RBC CLUMPS #/AREA URNS AUTO: 20 /HPF (ref 0–4)
SP GR UR STRIP.AUTO: 1.01 (ref 1–1.03)
SPECIFIC GRAVITY, POC: 1.01
UA COMPLETE W REFLEX CULTURE PNL UR: ABNORMAL
UA DIPSTICK W REFLEX MICRO PNL UR: YES
URN SPEC COLLECT METH UR: ABNORMAL
UROBILINOGEN UR STRIP-ACNC: 0.2 E.U./DL
UROBILINOGEN, POC: 0.2
WBC #/AREA URNS AUTO: 2 /HPF (ref 0–5)

## 2023-04-04 PROCEDURE — 81003 URINALYSIS AUTO W/O SCOPE: CPT | Performed by: FAMILY MEDICINE

## 2023-04-04 PROCEDURE — 1123F ACP DISCUSS/DSCN MKR DOCD: CPT | Performed by: FAMILY MEDICINE

## 2023-04-04 PROCEDURE — 99214 OFFICE O/P EST MOD 30 MIN: CPT | Performed by: FAMILY MEDICINE

## 2023-04-04 RX ORDER — CIPROFLOXACIN 500 MG/1
500 TABLET, FILM COATED ORAL 2 TIMES DAILY
Qty: 10 TABLET | Refills: 0 | Status: SHIPPED | OUTPATIENT
Start: 2023-04-04 | End: 2023-04-09

## 2023-04-04 SDOH — ECONOMIC STABILITY: FOOD INSECURITY: WITHIN THE PAST 12 MONTHS, YOU WORRIED THAT YOUR FOOD WOULD RUN OUT BEFORE YOU GOT MONEY TO BUY MORE.: NEVER TRUE

## 2023-04-04 SDOH — ECONOMIC STABILITY: FOOD INSECURITY: WITHIN THE PAST 12 MONTHS, THE FOOD YOU BOUGHT JUST DIDN'T LAST AND YOU DIDN'T HAVE MONEY TO GET MORE.: NEVER TRUE

## 2023-04-04 SDOH — ECONOMIC STABILITY: HOUSING INSECURITY
IN THE LAST 12 MONTHS, WAS THERE A TIME WHEN YOU DID NOT HAVE A STEADY PLACE TO SLEEP OR SLEPT IN A SHELTER (INCLUDING NOW)?: NO

## 2023-04-04 SDOH — ECONOMIC STABILITY: INCOME INSECURITY: HOW HARD IS IT FOR YOU TO PAY FOR THE VERY BASICS LIKE FOOD, HOUSING, MEDICAL CARE, AND HEATING?: NOT HARD AT ALL

## 2023-04-04 ASSESSMENT — PATIENT HEALTH QUESTIONNAIRE - PHQ9
SUM OF ALL RESPONSES TO PHQ QUESTIONS 1-9: 0
1. LITTLE INTEREST OR PLEASURE IN DOING THINGS: 0
SUM OF ALL RESPONSES TO PHQ9 QUESTIONS 1 & 2: 0
2. FEELING DOWN, DEPRESSED OR HOPELESS: 0

## 2023-04-04 ASSESSMENT — ENCOUNTER SYMPTOMS
SORE THROAT: 0
NAUSEA: 0
ABDOMINAL PAIN: 0
BLOOD IN STOOL: 0
VOMITING: 0
SHORTNESS OF BREATH: 0
COUGH: 1

## 2023-04-04 NOTE — PROGRESS NOTES
Chief Complaint   Patient presents with    Follow-up     Pt is here for a f/u. Urinary Frequency     Pt is c/o frequent urination x 3 weeks     Other     Pt is c/o confusion x 4 weeks. Pt grandson stated that she forgets things and has been \"spaced out\". Tawana Schmidt is a 80 y.o. female who presents for routine visit    Pt has a hx of HTN, CAD, dyslipidemia and TIA's and is on norvasc, lipitor and coreg medications along with a daily Baby ASA. Pt has been eating a very healthy mediterranean diet       Pt did not do COVID vaccine yet     Pt has a hx of Alzheimer's dementia and used to be on aricept medication in the past     Pt is a nonsmoker and denies alcohol use      2D Echo 11/19/2021   Summary   -A bubble study was performed and fails to show evidence of shunting.   -Left ventricular systolic function is normal with ejection fraction   estimated at 60-65 %. -There is moderate concentric left ventricular hypertrophy. -Grade I diastolic dysfunction with normal LV filling pressures.   -The left atrium is mildly dilated. -Aortic valve leaflets appear thickened. -Mild aortic regurgitation is present. -Mitral valve leaflets appear mildly thickened. -Mild mitral and tricuspid regurgitation.   -Mild-moderate pulmonic regurgitation present. Review of Systems   Constitutional:  Negative for fatigue and fever. HENT:  Negative for congestion, nosebleeds and sore throat. Eyes:         Negative blurred vision or diplopia   Respiratory:  Positive for cough (dry at times). Negative for shortness of breath. Cardiovascular:  Negative for chest pain, palpitations and leg swelling. Gastrointestinal:  Negative for abdominal pain, blood in stool, nausea and vomiting. Negative melena or indigestion   Genitourinary:  Positive for frequency. Negative for dysuria and hematuria. Musculoskeletal:  Negative for arthralgias. Skin:  Negative for rash.    Neurological:  Negative for

## 2023-04-06 DIAGNOSIS — R31.9 HEMATURIA, UNSPECIFIED TYPE: Primary | ICD-10-CM

## 2023-06-29 DIAGNOSIS — E78.5 DYSLIPIDEMIA: ICD-10-CM

## 2023-06-29 DIAGNOSIS — I10 HTN (HYPERTENSION), BENIGN: ICD-10-CM

## 2023-06-29 RX ORDER — ATORVASTATIN CALCIUM 40 MG/1
TABLET, FILM COATED ORAL
Qty: 90 TABLET | Refills: 1 | Status: SHIPPED | OUTPATIENT
Start: 2023-06-29

## 2023-06-29 RX ORDER — AMLODIPINE BESYLATE 10 MG/1
TABLET ORAL
Qty: 90 TABLET | Refills: 1 | Status: SHIPPED | OUTPATIENT
Start: 2023-06-29

## 2023-06-29 RX ORDER — CARVEDILOL 3.12 MG/1
TABLET ORAL
Qty: 180 TABLET | Refills: 1 | Status: SHIPPED | OUTPATIENT
Start: 2023-06-29

## 2023-06-29 NOTE — TELEPHONE ENCOUNTER
Medication:   Requested Prescriptions     Pending Prescriptions Disp Refills    carvedilol (COREG) 3.125 MG tablet [Pharmacy Med Name: CARVEDILOL 3.125 MG TABLET] 180 tablet 1     Sig: TAKE 1 TABLET BY MOUTH TWICE A DAY WITH MEALS    amLODIPine (NORVASC) 10 MG tablet [Pharmacy Med Name: AMLODIPINE BESYLATE 10 MG TAB] 90 tablet 1     Sig: TAKE 1 TABLET BY MOUTH EVERY DAY    atorvastatin (LIPITOR) 40 MG tablet [Pharmacy Med Name: ATORVASTATIN 40 MG TABLET] 90 tablet 1     Sig: TAKE 1 TABLET BY MOUTH EVERY DAY       Last Filled: Carvedilol- 09/29/2022 #180 with 1 refill  Amlodipine- 09/29/2022 #90 with 1 refill   Atorvastatin- 09/29/2022 #90 with 1 refill     Patient Phone Number: [phone] (home)     Last appt: 4/4/2023   Next appt: 7/10/2023    Last Lipid:   Lab Results   Component Value Date/Time    CHOL 117 11/18/2021 04:33 AM    TRIG 56 11/18/2021 04:33 AM    HDL 45 11/18/2021 04:33 AM    LDLCALC 61 11/18/2021 04:33 AM

## 2023-07-12 ENCOUNTER — OFFICE VISIT (OUTPATIENT)
Dept: PRIMARY CARE CLINIC | Age: 88
End: 2023-07-12
Payer: MEDICARE

## 2023-07-12 VITALS
TEMPERATURE: 94.9 F | OXYGEN SATURATION: 97 % | RESPIRATION RATE: 16 BRPM | HEIGHT: 63 IN | HEART RATE: 74 BPM | WEIGHT: 138 LBS | DIASTOLIC BLOOD PRESSURE: 82 MMHG | SYSTOLIC BLOOD PRESSURE: 130 MMHG | BODY MASS INDEX: 24.45 KG/M2

## 2023-07-12 DIAGNOSIS — I38 VALVULAR HEART DISEASE: ICD-10-CM

## 2023-07-12 DIAGNOSIS — Z23 NEED FOR TETANUS BOOSTER: ICD-10-CM

## 2023-07-12 DIAGNOSIS — I10 ESSENTIAL HYPERTENSION: ICD-10-CM

## 2023-07-12 DIAGNOSIS — E78.5 DYSLIPIDEMIA: ICD-10-CM

## 2023-07-12 DIAGNOSIS — I25.10 CORONARY ARTERY DISEASE INVOLVING NATIVE HEART WITHOUT ANGINA PECTORIS, UNSPECIFIED VESSEL OR LESION TYPE: Primary | ICD-10-CM

## 2023-07-12 DIAGNOSIS — G45.9 TIA (TRANSIENT ISCHEMIC ATTACK): ICD-10-CM

## 2023-07-12 PROCEDURE — 99214 OFFICE O/P EST MOD 30 MIN: CPT | Performed by: FAMILY MEDICINE

## 2023-07-12 PROCEDURE — 1123F ACP DISCUSS/DSCN MKR DOCD: CPT | Performed by: FAMILY MEDICINE

## 2023-07-12 ASSESSMENT — ENCOUNTER SYMPTOMS
BLOOD IN STOOL: 0
SORE THROAT: 0
NAUSEA: 0
COUGH: 0
ABDOMINAL PAIN: 0
SHORTNESS OF BREATH: 0
VOMITING: 0

## 2023-12-01 DIAGNOSIS — I10 HTN (HYPERTENSION), BENIGN: ICD-10-CM

## 2023-12-01 NOTE — TELEPHONE ENCOUNTER
Medication:   Requested Prescriptions     Pending Prescriptions Disp Refills    amLODIPine (NORVASC) 10 MG tablet [Pharmacy Med Name: AMLODIPINE BESYLATE 10 MG TAB] 90 tablet 1     Sig: TAKE 1 TABLET BY MOUTH EVERY DAY        Last Filled: 06/29/2023 #90 with 1 refill     Patient Phone Number: [phone] (home)     Last appt: 7/12/2023   Next appt: Return in about 4 months (around 11/12/2023).      Last OARRS:        No data to display

## 2023-12-04 RX ORDER — AMLODIPINE BESYLATE 10 MG/1
TABLET ORAL
Qty: 90 TABLET | Refills: 0 | Status: SHIPPED | OUTPATIENT
Start: 2023-12-04

## 2024-02-15 ENCOUNTER — OFFICE VISIT (OUTPATIENT)
Dept: PRIMARY CARE CLINIC | Age: 89
End: 2024-02-15
Payer: MEDICARE

## 2024-02-15 VITALS
DIASTOLIC BLOOD PRESSURE: 68 MMHG | WEIGHT: 134 LBS | SYSTOLIC BLOOD PRESSURE: 132 MMHG | RESPIRATION RATE: 16 BRPM | BODY MASS INDEX: 23.74 KG/M2 | HEIGHT: 63 IN | OXYGEN SATURATION: 98 % | TEMPERATURE: 98 F

## 2024-02-15 DIAGNOSIS — I38 VALVULAR HEART DISEASE: ICD-10-CM

## 2024-02-15 DIAGNOSIS — I10 ESSENTIAL HYPERTENSION: ICD-10-CM

## 2024-02-15 DIAGNOSIS — I25.10 CORONARY ARTERY DISEASE INVOLVING NATIVE HEART WITHOUT ANGINA PECTORIS, UNSPECIFIED VESSEL OR LESION TYPE: Primary | ICD-10-CM

## 2024-02-15 DIAGNOSIS — G45.9 TIA (TRANSIENT ISCHEMIC ATTACK): ICD-10-CM

## 2024-02-15 PROCEDURE — 99214 OFFICE O/P EST MOD 30 MIN: CPT | Performed by: FAMILY MEDICINE

## 2024-02-15 PROCEDURE — 1123F ACP DISCUSS/DSCN MKR DOCD: CPT | Performed by: FAMILY MEDICINE

## 2024-02-15 ASSESSMENT — PATIENT HEALTH QUESTIONNAIRE - PHQ9
2. FEELING DOWN, DEPRESSED OR HOPELESS: 0
SUM OF ALL RESPONSES TO PHQ9 QUESTIONS 1 & 2: 0
SUM OF ALL RESPONSES TO PHQ QUESTIONS 1-9: 0
1. LITTLE INTEREST OR PLEASURE IN DOING THINGS: 0

## 2024-02-15 NOTE — PROGRESS NOTES
Chief Complaint   Patient presents with    Follow-up         Katherine Vásquez is a 89 y.o. female who presents for routine visit. Pt did not do bloodwork ordered at last office visit    Pt has a hx of HTN, CAD, dyslipidemia and TIA's and is on norvasc, lipitor and coreg medications along with a daily Baby ASA. Pt has been eating a very healthy mediterranean diet       Pt did not do COVID vaccine yet     Pt has a hx of Alzheimer's dementia and used to be on aricept medication in the past     Pt is a nonsmoker and denies alcohol use      Review of Systems   Constitutional:  Negative for fatigue and fever.   HENT:  Negative for congestion, nosebleeds and sore throat.    Eyes:         Negative blurred vision or diplopia   Respiratory:  Positive for cough (dry at times). Negative for shortness of breath.    Cardiovascular:  Negative for chest pain, palpitations and leg swelling.   Gastrointestinal:  Negative for abdominal pain, blood in stool, nausea and vomiting.        Negative melena or indigestion   Endocrine: Negative for polyuria.   Genitourinary:  Negative for dysuria and hematuria.   Musculoskeletal:  Negative for arthralgias.   Skin:  Negative for rash.   Neurological:  Negative for dizziness, seizures, syncope, speech difficulty, weakness and headaches.   Psychiatric/Behavioral:  Negative for dysphoric mood. The patient is not nervous/anxious.          Vitals:    02/15/24 1322   BP: 132/68   Resp: 16   Temp: 98 °F (36.7 °C)   SpO2: 98%         Physical Exam  Vitals reviewed.   Constitutional:       General: She is not in acute distress.  HENT:      Mouth/Throat:      Mouth: Mucous membranes are moist.      Pharynx: Oropharynx is clear.   Eyes:      General: No scleral icterus.     Comments: Pink conjunctivae    Neck:      Thyroid: No thyromegaly.   Cardiovascular:      Heart sounds: Murmur (2/6 systolic) heard.      No friction rub. No gallop.   Pulmonary:      Effort: Pulmonary effort is normal.      Breath

## 2024-02-25 DIAGNOSIS — I10 HTN (HYPERTENSION), BENIGN: ICD-10-CM

## 2024-02-26 RX ORDER — CARVEDILOL 3.12 MG/1
3.12 TABLET ORAL 2 TIMES DAILY WITH MEALS
Qty: 180 TABLET | Refills: 1 | Status: SHIPPED | OUTPATIENT
Start: 2024-02-26

## 2024-02-26 NOTE — TELEPHONE ENCOUNTER
Medication:   Requested Prescriptions     Pending Prescriptions Disp Refills    carvedilol (COREG) 3.125 MG tablet [Pharmacy Med Name: CARVEDILOL 3.125 MG TABLET] 180 tablet 1     Sig: TAKE 1 TABLET BY MOUTH TWICE A DAY WITH FOOD        Last Filled:  6/29/2023 # 180 with 1 refill     Patient Phone Number: [phone] (home)     Last appt: 2/15/2024   Next appt: 6/17/2024    Last OARRS:        No data to display

## 2024-04-25 ENCOUNTER — TELEPHONE (OUTPATIENT)
Dept: PRIMARY CARE CLINIC | Age: 89
End: 2024-04-25

## 2024-04-25 DIAGNOSIS — I10 HTN (HYPERTENSION), BENIGN: Primary | ICD-10-CM

## 2024-04-25 NOTE — TELEPHONE ENCOUNTER
Spoke to the patients grandson and he stated that DM would need to put in an order for the device so it can be sent to the patients insurance. The patient stated that once approved he would just have to pay 20% of the cost and the insurance would pay the rest.

## 2024-04-25 NOTE — TELEPHONE ENCOUNTER
Pt's grandson is calling to get a DME for a Telemetry transmitter. He wants to monitor her heart BP and oxygen. She has a bad heart. She has a pacemaker. They just changed the battery on the pace maker. He just wants to check her in case something should change or fluctuate he could either contact Dr. Washington or the ER.    Rikki Zhao's phone number is [phone].

## 2024-04-25 NOTE — TELEPHONE ENCOUNTER
Patient steve has been notified that the patient would need to see cardiology. A referral has been pended for the provider. Patient steve would like to see a provider in [place].  Please advise.

## 2024-04-25 NOTE — TELEPHONE ENCOUNTER
Patients grandson has been notified of referral placed and given the instructions to schedule an appointment.     No further action is needed for this encounter

## 2024-04-26 ENCOUNTER — TELEPHONE (OUTPATIENT)
Dept: CARDIOLOGY CLINIC | Age: 89
End: 2024-04-26

## 2024-04-26 NOTE — TELEPHONE ENCOUNTER
New Patient Referral    Referring Provider Name:Sabas Washington MD   Phone Number:[phone]   Fax Number:   Address:      Diagnosis/Reason for Visit: 10 (ICD-10-CM) - HTN (hypertension), benign    Cardiac Clearance?      Left vm for patient to schedule first avail with General

## 2024-05-21 PROBLEM — I25.10 CORONARY ARTERY DISEASE INVOLVING NATIVE CORONARY ARTERY OF NATIVE HEART: Status: ACTIVE | Noted: 2024-05-21

## 2024-05-21 NOTE — PROGRESS NOTES
GLUCOSE 90 11/19/2021 02:51 PM    CALCIUM 9.5 11/19/2021 02:51 PM    LABGLOM >60 11/19/2021 02:51 PM       Lab Results   Component Value Date/Time    ALKPHOS 142 11/17/2021 04:11 PM    ALT 8 11/17/2021 04:11 PM    AST 17 11/17/2021 04:11 PM    BILITOT 0.8 11/17/2021 04:11 PM      No results found for: \"BNP\"   Lab Results   Component Value Date/Time    TSH 2.25 11/17/2021 04:11 PM    TSH 1.74 02/05/2021 03:30 PM      Lab Results   Component Value Date/Time    WBC 8.2 05/23/2024 03:05 PM    HGB 12.4 05/23/2024 03:05 PM    HCT 37.3 05/23/2024 03:05 PM    MCV 90.3 05/23/2024 03:05 PM     05/23/2024 03:05 PM    LYMPHOPCT 17.5 05/23/2024 03:05 PM    RBC 4.13 05/23/2024 03:05 PM    MCH 30.1 05/23/2024 03:05 PM    MCHC 33.3 05/23/2024 03:05 PM    RDW 15.3 05/23/2024 03:05 PM     Lab Results   Component Value Date/Time    CHOL 117 11/18/2021 04:33 AM    TRIG 56 11/18/2021 04:33 AM    HDL 45 11/18/2021 04:33 AM    VLDL 11 11/18/2021 04:33 AM        CT Result (most recent):  CT HEAD WO CONTRAST 10/02/2022    Narrative  EXAMINATION:  CT OF THE HEAD WITHOUT CONTRAST; CT OF THE CERVICAL SPINE WITHOUT CONTRAST  10/2/2022 12:09 am    TECHNIQUE:  CT of the head was performed without the administration of intravenous  contrast. Automated exposure control, iterative reconstruction, and/or weight  based adjustment of the mA/kV was utilized to reduce the radiation dose to as  low as reasonably achievable.; CT of the cervical spine was performed without  the administration of intravenous contrast. Multiplanar reformatted images  are provided for review. Automated exposure control, iterative  reconstruction, and/or weight based adjustment of the mA/kV was utilized to  reduce the radiation dose to as low as reasonably achievable.    COMPARISON:  None.    HISTORY:  ORDERING SYSTEM PROVIDED HISTORY: head injury  TECHNOLOGIST PROVIDED HISTORY:  Reason for exam:->head injury  Has a \"code stroke\" or \"stroke alert\" been

## 2024-05-23 ENCOUNTER — HOSPITAL ENCOUNTER (OUTPATIENT)
Age: 89
Discharge: HOME | End: 2024-05-23
Payer: MEDICARE

## 2024-05-23 ENCOUNTER — OFFICE VISIT (OUTPATIENT)
Dept: CARDIOLOGY CLINIC | Age: 89
End: 2024-05-23
Payer: MEDICARE

## 2024-05-23 VITALS
BODY MASS INDEX: 23.57 KG/M2 | OXYGEN SATURATION: 99 % | DIASTOLIC BLOOD PRESSURE: 80 MMHG | HEIGHT: 63 IN | SYSTOLIC BLOOD PRESSURE: 124 MMHG | WEIGHT: 133 LBS | HEART RATE: 68 BPM

## 2024-05-23 DIAGNOSIS — I48.91 ATRIAL FIBRILLATION AND FLUTTER (HCC): ICD-10-CM

## 2024-05-23 DIAGNOSIS — I10 HTN (HYPERTENSION), BENIGN: ICD-10-CM

## 2024-05-23 DIAGNOSIS — I34.0 MITRAL VALVE INSUFFICIENCY, UNSPECIFIED ETIOLOGY: ICD-10-CM

## 2024-05-23 DIAGNOSIS — E78.5 DYSLIPIDEMIA: ICD-10-CM

## 2024-05-23 DIAGNOSIS — I25.10 CORONARY ARTERY DISEASE INVOLVING NATIVE CORONARY ARTERY OF NATIVE HEART WITHOUT ANGINA PECTORIS: ICD-10-CM

## 2024-05-23 DIAGNOSIS — I48.92 ATRIAL FIBRILLATION AND FLUTTER (HCC): ICD-10-CM

## 2024-05-23 DIAGNOSIS — I63.9 CEREBROVASCULAR ACCIDENT (CVA), UNSPECIFIED MECHANISM (HCC): ICD-10-CM

## 2024-05-23 DIAGNOSIS — I10 HTN (HYPERTENSION), BENIGN: Primary | ICD-10-CM

## 2024-05-23 DIAGNOSIS — R01.1 CARDIAC MURMUR: ICD-10-CM

## 2024-05-23 DIAGNOSIS — R01.1 HEART MURMUR: ICD-10-CM

## 2024-05-23 DIAGNOSIS — I35.0 NONRHEUMATIC AORTIC VALVE STENOSIS: ICD-10-CM

## 2024-05-23 LAB
ALBUMIN SERPL-MCNC: 3.2 G/DL (ref 3.4–5)
ALBUMIN/GLOB SERPL: 0.7 {RATIO} (ref 1.1–2.2)
ALP SERPL-CCNC: 156 U/L (ref 40–129)
ALT SERPL-CCNC: 7 U/L (ref 10–40)
ANION GAP SERPL CALCULATED.3IONS-SCNC: 11 MMOL/L (ref 3–16)
AST SERPL-CCNC: 17 U/L (ref 15–37)
BASOPHILS # BLD: 0 K/UL (ref 0–0.2)
BASOPHILS NFR BLD: 0.3 %
BILIRUB SERPL-MCNC: <0.2 MG/DL (ref 0–1)
BUN SERPL-MCNC: 22 MG/DL (ref 7–20)
CALCIUM SERPL-MCNC: 9.4 MG/DL (ref 8.3–10.6)
CHLORIDE SERPL-SCNC: 105 MMOL/L (ref 99–110)
CHOLEST SERPL-MCNC: 125 MG/DL (ref 0–199)
CO2 SERPL-SCNC: 26 MMOL/L (ref 21–32)
CREAT SERPL-MCNC: 1 MG/DL (ref 0.6–1.2)
DEPRECATED RDW RBC AUTO: 15.3 % (ref 12.4–15.4)
EOSINOPHIL # BLD: 0.1 K/UL (ref 0–0.6)
EOSINOPHIL NFR BLD: 0.9 %
GFR SERPLBLD CREATININE-BSD FMLA CKD-EPI: 52 ML/MIN/{1.73_M2}
GLUCOSE SERPL-MCNC: 98 MG/DL (ref 70–99)
HCT VFR BLD AUTO: 37.3 % (ref 36–48)
HDLC SERPL-MCNC: 42 MG/DL (ref 40–60)
HGB BLD-MCNC: 12.4 G/DL (ref 12–16)
INR PPP: 1.19 (ref 0.85–1.15)
LDLC SERPL CALC-MCNC: 55 MG/DL
LYMPHOCYTES # BLD: 1.4 K/UL (ref 1–5.1)
LYMPHOCYTES NFR BLD: 17.5 %
MCH RBC QN AUTO: 30.1 PG (ref 26–34)
MCHC RBC AUTO-ENTMCNC: 33.3 G/DL (ref 31–36)
MCV RBC AUTO: 90.3 FL (ref 80–100)
MONOCYTES # BLD: 0.6 K/UL (ref 0–1.3)
MONOCYTES NFR BLD: 7.8 %
NEUTROPHILS # BLD: 6 K/UL (ref 1.7–7.7)
NEUTROPHILS NFR BLD: 73.5 %
PLATELET # BLD AUTO: 184 K/UL (ref 135–450)
PMV BLD AUTO: 9.3 FL (ref 5–10.5)
POTASSIUM SERPL-SCNC: 3.8 MMOL/L (ref 3.5–5.1)
PROT SERPL-MCNC: 7.8 G/DL (ref 6.4–8.2)
PROTHROMBIN TIME: 15.3 SEC (ref 11.9–14.9)
RBC # BLD AUTO: 4.13 M/UL (ref 4–5.2)
SODIUM SERPL-SCNC: 142 MMOL/L (ref 136–145)
TRIGL SERPL-MCNC: 138 MG/DL (ref 0–150)
VLDLC SERPL CALC-MCNC: 28 MG/DL
WBC # BLD AUTO: 8.2 K/UL (ref 4–11)

## 2024-05-23 PROCEDURE — 80053 COMPREHEN METABOLIC PANEL: CPT

## 2024-05-23 PROCEDURE — 85025 COMPLETE CBC W/AUTO DIFF WBC: CPT

## 2024-05-23 PROCEDURE — 80061 LIPID PANEL: CPT

## 2024-05-23 PROCEDURE — 1123F ACP DISCUSS/DSCN MKR DOCD: CPT | Performed by: INTERNAL MEDICINE

## 2024-05-23 PROCEDURE — 36415 COLL VENOUS BLD VENIPUNCTURE: CPT

## 2024-05-23 PROCEDURE — 99204 OFFICE O/P NEW MOD 45 MIN: CPT | Performed by: INTERNAL MEDICINE

## 2024-05-23 PROCEDURE — 93000 ELECTROCARDIOGRAM COMPLETE: CPT | Performed by: INTERNAL MEDICINE

## 2024-05-23 PROCEDURE — 85610 PROTHROMBIN TIME: CPT

## 2024-05-23 NOTE — ASSESSMENT & PLAN NOTE
Newly discovered on ECG today in office, no prior documented history  Discussed findings with Grandson  Heart rate appears controlled, recommend continuing BB  Discussed role of OAC (elevated risk given h/o TIA, CHADSVASC ~6); no frequent falls or bleeding history  Recommend considering Eliquis 2.5 mg BID (stop aspirin) after labs return  Check Echo

## 2024-05-23 NOTE — PATIENT INSTRUCTIONS
Echocardiogram (Ultrasound of heart)    Lab work today    May start Eliquis    Follow up in 1 month

## 2024-05-24 ENCOUNTER — TELEPHONE (OUTPATIENT)
Dept: CARDIOLOGY CLINIC | Age: 89
End: 2024-05-24

## 2024-05-24 NOTE — RESULT ENCOUNTER NOTE
Informed Katherine and her grandson Rikki, all labs CMP, CBC,PT/INR, and lipids all good. They may proceed with Eliquis.

## 2024-05-24 NOTE — TELEPHONE ENCOUNTER
I spoke with Dulce Maria Brandtel's grandson, I informed him of her lab results, CBC, CMP, Lipids, and PT/INR. I informed him that they may proceed with starting the Eliquis. He verbalized understanding.

## 2024-07-09 ENCOUNTER — TELEPHONE (OUTPATIENT)
Dept: PRIMARY CARE CLINIC | Age: 89
End: 2024-07-09

## 2024-07-09 NOTE — TELEPHONE ENCOUNTER
----- Message from Connor Radha Jane sent at 7/9/2024 11:01 AM EDT -----  Regarding: E[facility] Appointment Request  E[facility] Appointment Request    Patient needs appointment for E[facility] Appointment Type: Annual Visit.    Patient Requested Dates(s): 07/10/2024  Patient Requested Time: any time  Provider Name: Sabas Washington MD    Reason for Appointment Request: New Patient - No appointments available during search  --------------------------------------------------------------------------------------------------------------------------    Relationship to Patient: Guardian / POA    Call Back Information: OK to leave message on voicemail  Preferred Call Back Number: Phone [phone] (home)

## 2024-08-09 DIAGNOSIS — E78.5 DYSLIPIDEMIA: ICD-10-CM

## 2024-08-09 DIAGNOSIS — I10 HTN (HYPERTENSION), BENIGN: ICD-10-CM

## 2024-08-09 RX ORDER — AMLODIPINE BESYLATE 10 MG/1
TABLET ORAL
Qty: 90 TABLET | Refills: 1 | Status: SHIPPED | OUTPATIENT
Start: 2024-08-09

## 2024-08-09 RX ORDER — ATORVASTATIN CALCIUM 40 MG/1
TABLET, FILM COATED ORAL
Qty: 90 TABLET | Refills: 1 | Status: SHIPPED | OUTPATIENT
Start: 2024-08-09

## 2024-08-09 NOTE — TELEPHONE ENCOUNTER
Medication:   Requested Prescriptions     Pending Prescriptions Disp Refills    atorvastatin (LIPITOR) 40 MG tablet [Pharmacy Med Name: ATORVASTATIN 40 MG TABLET] 90 tablet 1     Sig: TAKE 1 TABLET BY MOUTH EVERY DAY    amLODIPine (NORVASC) 10 MG tablet [Pharmacy Med Name: AMLODIPINE BESYLATE 10 MG TAB] 90 tablet 0     Sig: TAKE 1 TABLET BY MOUTH EVERY DAY        Last Filled:  06/29/2023 atorvastatin 40 mg # 90 refills 1,12/04/2023  amlodipine 10 mg  # 90 refills 0    Patient Phone Number: [phone] (home)     Last appt: 2/15/2024   Next appt: Visit date not found    Last OARRS:        No data to display

## 2024-08-14 DIAGNOSIS — I10 HTN (HYPERTENSION), BENIGN: ICD-10-CM

## 2024-08-14 RX ORDER — CARVEDILOL 3.12 MG/1
3.12 TABLET ORAL 2 TIMES DAILY WITH MEALS
Qty: 180 TABLET | Refills: 0 | Status: SHIPPED | OUTPATIENT
Start: 2024-08-14

## 2024-08-14 NOTE — TELEPHONE ENCOUNTER
Medication:   Requested Prescriptions     Pending Prescriptions Disp Refills    carvedilol (COREG) 3.125 MG tablet [Pharmacy Med Name: CARVEDILOL 3.125 MG TABLET] 180 tablet 1     Sig: TAKE 1 TABLET BY MOUTH TWICE A DAY WITH FOOD        Last Filled:  02/26/2024 # 180 refills 1 ordered.    Patient Phone Number: [phone] (home)     Last appt: 2/15/2024   Next appt: Visit date not found    Last OARRS:        No data to display

## 2024-10-04 ENCOUNTER — TELEPHONE (OUTPATIENT)
Dept: PRIMARY CARE CLINIC | Age: 89
End: 2024-10-04

## 2024-10-04 RX ORDER — SENNOSIDES 8.6 MG/1
TABLET, COATED ORAL
COMMUNITY
Start: 2024-09-20

## 2024-10-04 NOTE — TELEPHONE ENCOUNTER
Pt grandson calling to speak to Dr. Washington about his grandmother. Wants to discuss some things before her 10/9/2024 VV appt.

## 2024-10-07 ENCOUNTER — TELEPHONE (OUTPATIENT)
Dept: PRIMARY CARE CLINIC | Age: 89
End: 2024-10-07

## 2024-10-07 DIAGNOSIS — I10 PRIMARY HYPERTENSION: ICD-10-CM

## 2024-10-07 DIAGNOSIS — I63.9 CEREBRAL INFARCTION, UNSPECIFIED MECHANISM (HCC): ICD-10-CM

## 2024-10-07 DIAGNOSIS — I48.0 PAROXYSMAL ATRIAL FIBRILLATION (HCC): Primary | ICD-10-CM

## 2024-10-07 DIAGNOSIS — N39.0 URINARY TRACT INFECTION WITHOUT HEMATURIA, SITE UNSPECIFIED: ICD-10-CM

## 2024-10-07 NOTE — TELEPHONE ENCOUNTER
Blank\" [facility]      Scanned into       Original copy placed in provider basket

## 2024-10-08 NOTE — TELEPHONE ENCOUNTER
Billing completed for Home Health Certification (HCC)    [facility] Order [id number]     No further action is needed for this encounter.

## 2024-10-09 ENCOUNTER — TELEMEDICINE (OUTPATIENT)
Dept: PRIMARY CARE CLINIC | Age: 89
End: 2024-10-09
Payer: MEDICARE

## 2024-10-09 DIAGNOSIS — I48.0 PAROXYSMAL ATRIAL FIBRILLATION (HCC): ICD-10-CM

## 2024-10-09 DIAGNOSIS — I63.9 CEREBRAL INFARCTION, UNSPECIFIED MECHANISM (HCC): Primary | ICD-10-CM

## 2024-10-09 DIAGNOSIS — I10 PRIMARY HYPERTENSION: ICD-10-CM

## 2024-10-09 DIAGNOSIS — I25.10 CORONARY ARTERY DISEASE INVOLVING NATIVE HEART WITHOUT ANGINA PECTORIS, UNSPECIFIED VESSEL OR LESION TYPE: ICD-10-CM

## 2024-10-09 PROCEDURE — 99214 OFFICE O/P EST MOD 30 MIN: CPT | Performed by: FAMILY MEDICINE

## 2024-10-09 PROCEDURE — 1123F ACP DISCUSS/DSCN MKR DOCD: CPT | Performed by: FAMILY MEDICINE

## 2024-10-14 ENCOUNTER — TELEPHONE (OUTPATIENT)
Dept: PRIMARY CARE CLINIC | Age: 89
End: 2024-10-14

## 2024-10-14 NOTE — TELEPHONE ENCOUNTER
Blank\" fax from [facility]     Scanned into       Original copy placed in provider basket

## 2024-10-14 NOTE — TELEPHONE ENCOUNTER
Blank\" fax from [facility]     Scanned into       Original copy placed in provider basket

## 2024-10-15 ENCOUNTER — HOSPITAL ENCOUNTER (EMERGENCY)
Age: 89
Discharge: HOME | End: 2024-10-15
Payer: MEDICARE

## 2024-10-15 ENCOUNTER — APPOINTMENT (OUTPATIENT)
Dept: GENERAL RADIOLOGY | Age: 89
End: 2024-10-15
Payer: MEDICARE

## 2024-10-15 VITALS
DIASTOLIC BLOOD PRESSURE: 78 MMHG | OXYGEN SATURATION: 99 % | BODY MASS INDEX: 23.03 KG/M2 | HEART RATE: 92 BPM | RESPIRATION RATE: 18 BRPM | WEIGHT: 130 LBS | TEMPERATURE: 97.3 F | SYSTOLIC BLOOD PRESSURE: 128 MMHG

## 2024-10-15 DIAGNOSIS — N30.00 ACUTE CYSTITIS WITHOUT HEMATURIA: Primary | ICD-10-CM

## 2024-10-15 LAB
ALBUMIN SERPL-MCNC: 3.3 G/DL (ref 3.4–5)
ALBUMIN/GLOB SERPL: 0.8 {RATIO} (ref 1.1–2.2)
ALP SERPL-CCNC: 111 U/L (ref 40–129)
ALT SERPL-CCNC: 9 U/L (ref 10–40)
ANION GAP SERPL CALCULATED.3IONS-SCNC: 12 MMOL/L (ref 3–16)
AST SERPL-CCNC: 25 U/L (ref 15–37)
BACTERIA URNS QL MICRO: ABNORMAL /HPF
BASOPHILS # BLD: 0 K/UL (ref 0–0.2)
BASOPHILS NFR BLD: 0.4 %
BILIRUB SERPL-MCNC: 0.4 MG/DL (ref 0–1)
BILIRUB UR QL STRIP.AUTO: NEGATIVE
BUN SERPL-MCNC: 19 MG/DL (ref 7–20)
CALCIUM SERPL-MCNC: 9.2 MG/DL (ref 8.3–10.6)
CHLORIDE SERPL-SCNC: 105 MMOL/L (ref 99–110)
CLARITY UR: ABNORMAL
CO2 SERPL-SCNC: 21 MMOL/L (ref 21–32)
COLOR UR: YELLOW
CREAT SERPL-MCNC: 0.8 MG/DL (ref 0.6–1.2)
DEPRECATED RDW RBC AUTO: 17 % (ref 12.4–15.4)
EOSINOPHIL # BLD: 0.1 K/UL (ref 0–0.6)
EOSINOPHIL NFR BLD: 1.1 %
EPI CELLS #/AREA URNS HPF: ABNORMAL /HPF (ref 0–5)
FLUAV RNA RESP QL NAA+PROBE: NOT DETECTED
FLUBV RNA RESP QL NAA+PROBE: NOT DETECTED
GFR SERPLBLD CREATININE-BSD FMLA CKD-EPI: 68 ML/MIN/{1.73_M2}
GLUCOSE SERPL-MCNC: 131 MG/DL (ref 70–99)
GLUCOSE UR STRIP.AUTO-MCNC: NEGATIVE MG/DL
HCT VFR BLD AUTO: 36.3 % (ref 36–48)
HGB BLD-MCNC: 12.2 G/DL (ref 12–16)
HGB UR QL STRIP.AUTO: ABNORMAL
KETONES UR STRIP.AUTO-MCNC: NEGATIVE MG/DL
LEUKOCYTE ESTERASE UR QL STRIP.AUTO: ABNORMAL
LYMPHOCYTES # BLD: 1.1 K/UL (ref 1–5.1)
LYMPHOCYTES NFR BLD: 13 %
MCH RBC QN AUTO: 30.7 PG (ref 26–34)
MCHC RBC AUTO-ENTMCNC: 33.7 G/DL (ref 31–36)
MCV RBC AUTO: 91.1 FL (ref 80–100)
MONOCYTES # BLD: 0.7 K/UL (ref 0–1.3)
MONOCYTES NFR BLD: 8.1 %
NEUTROPHILS # BLD: 6.8 K/UL (ref 1.7–7.7)
NEUTROPHILS NFR BLD: 77.4 %
NITRITE UR QL STRIP.AUTO: NEGATIVE
PH UR STRIP.AUTO: 6.5 [PH] (ref 5–8)
PLATELET # BLD AUTO: 240 K/UL (ref 135–450)
PMV BLD AUTO: 8.4 FL (ref 5–10.5)
POTASSIUM SERPL-SCNC: 3.6 MMOL/L (ref 3.5–5.1)
PROT SERPL-MCNC: 7.5 G/DL (ref 6.4–8.2)
PROT UR STRIP.AUTO-MCNC: 100 MG/DL
RBC # BLD AUTO: 3.98 M/UL (ref 4–5.2)
RBC #/AREA URNS HPF: ABNORMAL /HPF (ref 0–4)
SARS-COV-2 RNA RESP QL NAA+PROBE: NOT DETECTED
SODIUM SERPL-SCNC: 138 MMOL/L (ref 136–145)
SP GR UR STRIP.AUTO: 1.01 (ref 1–1.03)
UA COMPLETE W REFLEX CULTURE PNL UR: YES
UA DIPSTICK W REFLEX MICRO PNL UR: YES
URN SPEC COLLECT METH UR: ABNORMAL
UROBILINOGEN UR STRIP-ACNC: 1 E.U./DL
WBC # BLD AUTO: 8.7 K/UL (ref 4–11)
WBC #/AREA URNS HPF: ABNORMAL /HPF (ref 0–5)

## 2024-10-15 PROCEDURE — 87636 SARSCOV2 & INF A&B AMP PRB: CPT

## 2024-10-15 PROCEDURE — 93005 ELECTROCARDIOGRAM TRACING: CPT | Performed by: PHYSICIAN ASSISTANT

## 2024-10-15 PROCEDURE — 87086 URINE CULTURE/COLONY COUNT: CPT

## 2024-10-15 PROCEDURE — 85025 COMPLETE CBC W/AUTO DIFF WBC: CPT

## 2024-10-15 PROCEDURE — 81001 URINALYSIS AUTO W/SCOPE: CPT

## 2024-10-15 PROCEDURE — 80053 COMPREHEN METABOLIC PANEL: CPT

## 2024-10-15 PROCEDURE — 99285 EMERGENCY DEPT VISIT HI MDM: CPT

## 2024-10-15 PROCEDURE — 71045 X-RAY EXAM CHEST 1 VIEW: CPT

## 2024-10-15 PROCEDURE — 51701 INSERT BLADDER CATHETER: CPT

## 2024-10-15 RX ORDER — CEFUROXIME AXETIL 250 MG/1
250 TABLET ORAL 2 TIMES DAILY
Qty: 14 TABLET | Refills: 0 | Status: SHIPPED | OUTPATIENT
Start: 2024-10-15 | End: 2024-10-22

## 2024-10-15 ASSESSMENT — PAIN - FUNCTIONAL ASSESSMENT: PAIN_FUNCTIONAL_ASSESSMENT: NONE - DENIES PAIN

## 2024-10-15 ASSESSMENT — ENCOUNTER SYMPTOMS
SHORTNESS OF BREATH: 0
COLOR CHANGE: 0
NAUSEA: 0
COUGH: 0
DIARRHEA: 0
BACK PAIN: 0
ABDOMINAL PAIN: 0
VOMITING: 0
CONSTIPATION: 0
RESPIRATORY NEGATIVE: 1
CHEST TIGHTNESS: 0

## 2024-10-15 NOTE — ED PROVIDER NOTES
EKG:  Read by me in the absence of a cardiologist shows: Atrial fibrillation, rate 77, QRS duration normal, axis left, poor R wave progression, nonspecific ST-T wave abnormality, no major change when compared to prior study from May 23, 2024    I did not perform face-to-face evaluation and was not otherwise involved in this patient's care.  Please see PA/NP note for further information on this visit.       Alia Landeros MD  10/15/24 0845    
  Constitutional:  Negative for activity change, appetite change, chills, diaphoresis, fatigue and fever.   Respiratory: Negative.  Negative for cough, chest tightness and shortness of breath.    Cardiovascular: Negative.  Negative for chest pain, palpitations and leg swelling.   Gastrointestinal:  Negative for abdominal pain, constipation, diarrhea, nausea and vomiting.   Genitourinary:  Negative for decreased urine volume, difficulty urinating, dysuria, flank pain, frequency, hematuria and urgency.   Musculoskeletal:  Negative for arthralgias, back pain, myalgias, neck pain and neck stiffness.   Skin:  Negative for color change, pallor, rash and wound.   Neurological:  Negative for dizziness, weakness, light-headedness, numbness and headaches.       Positives and Pertinent negatives as per HPI.     SURGICAL HISTORY   History reviewed. No pertinent surgical history.    CURRENTMEDICATIONS       Previous Medications    AMLODIPINE (NORVASC) 10 MG TABLET    TAKE 1 TABLET BY MOUTH EVERY DAY    APIXABAN (ELIQUIS) 2.5 MG TABS TABLET    Take 1 tablet by mouth 2 times daily    ASPIRIN (ECOTRIN LOW STRENGTH) 81 MG EC TABLET    Take 1 tablet by mouth daily    ATORVASTATIN (LIPITOR) 40 MG TABLET    TAKE 1 TABLET BY MOUTH EVERY DAY    CARVEDILOL (COREG) 3.125 MG TABLET    TAKE 1 TABLET BY MOUTH TWICE A DAY WITH FOOD    SENNA-TIME 8.6 MG TABLET    TAKE 1 TABLET BY MOUTH EVERY DAY AT BEDTIME FOR CONSTIPATION    VITAMIN D (CHOLECALCIFEROL) 25 MCG (1000 UT) TABS TABLET    TAKE 2 TABLETS BY MOUTH EVERY DAY       ALLERGIES     Prochlorperazine    FAMILYHISTORY     History reviewed. No pertinent family history.     SOCIAL HISTORY       Social History     Tobacco Use    Smoking status: Never     Passive exposure: Never    Smokeless tobacco: Never   Vaping Use    Vaping status: Never Used   Substance Use Topics    Alcohol use: Not Currently    Drug use: Never       SCREENINGS          Lis Coma Scale  Eye Opening: Spontaneous  Best

## 2024-10-16 LAB
BACTERIA UR CULT: NORMAL
EKG DIAGNOSIS: NORMAL
EKG Q-T INTERVAL: 410 MS
EKG QRS DURATION: 74 MS
EKG QTC CALCULATION (BAZETT): 463 MS
EKG R AXIS: -33 DEGREES
EKG T AXIS: 30 DEGREES
EKG VENTRICULAR RATE: 77 BPM

## 2024-10-16 PROCEDURE — 93010 ELECTROCARDIOGRAM REPORT: CPT | Performed by: INTERNAL MEDICINE

## 2024-10-17 ENCOUNTER — TELEPHONE (OUTPATIENT)
Dept: PRIMARY CARE CLINIC | Age: 89
End: 2024-10-17

## 2024-10-17 NOTE — TELEPHONE ENCOUNTER
Called Diaz back. Asked him to fax over the order for PT. He indicated he understood.  Will fax over order.

## 2024-10-17 NOTE — TELEPHONE ENCOUNTER
Diaz is a physical therapist from [facility]. He is asking for an order for physical therapy with this pt twice a week for six weeks.    Diaz's phone number is [phone].

## 2024-10-29 ENCOUNTER — TELEPHONE (OUTPATIENT)
Dept: PRIMARY CARE CLINIC | Age: 89
End: 2024-10-29

## 2024-10-29 NOTE — TELEPHONE ENCOUNTER
Blank\" fax from [facility]     Scanned into       Original copy placed in provider basket

## 2024-10-30 ENCOUNTER — TELEPHONE (OUTPATIENT)
Dept: PRIMARY CARE CLINIC | Age: 89
End: 2024-10-30

## 2024-10-30 NOTE — TELEPHONE ENCOUNTER
Blank\" fax from [facility]     Scanned into       Original copy placed in provider basket

## 2024-11-07 ENCOUNTER — TELEPHONE (OUTPATIENT)
Dept: PRIMARY CARE CLINIC | Age: 89
End: 2024-11-07

## 2024-11-07 NOTE — TELEPHONE ENCOUNTER
Blank\" fax from [facility]     Scanned into       Original copy placed in provider basket

## 2024-11-11 ENCOUNTER — TELEPHONE (OUTPATIENT)
Dept: PRIMARY CARE CLINIC | Age: 89
End: 2024-11-11

## 2024-11-11 NOTE — TELEPHONE ENCOUNTER
Blank\" fax from [facility]      Scanned into       Original copy placed in provider basket

## 2024-12-12 ENCOUNTER — TELEPHONE (OUTPATIENT)
Dept: PRIMARY CARE CLINIC | Age: 88
End: 2024-12-12

## 2024-12-12 NOTE — TELEPHONE ENCOUNTER
Blank\" fax from [facility]      Scanned into       Original copy placed in provider basket

## 2024-12-15 DIAGNOSIS — I10 HTN (HYPERTENSION), BENIGN: ICD-10-CM

## 2024-12-16 RX ORDER — CARVEDILOL 3.12 MG/1
3.12 TABLET ORAL 2 TIMES DAILY WITH MEALS
Qty: 180 TABLET | Refills: 0 | Status: SHIPPED | OUTPATIENT
Start: 2024-12-16

## 2024-12-16 NOTE — TELEPHONE ENCOUNTER
Medication:   Requested Prescriptions     Pending Prescriptions Disp Refills    carvedilol (COREG) 3.125 MG tablet [Pharmacy Med Name: CARVEDILOL 3.125 MG TABLET] 180 tablet 0     Sig: TAKE 1 TABLET BY MOUTH TWICE A DAY WITH FOOD        Last Filled:  08/14/2024 # 180 refills 0    Patient Phone Number: [phone] (home)     Last appt: 10/9/2024   Next appt: 12/17/2024    Last OARRS:        No data to display

## 2024-12-17 ENCOUNTER — OFFICE VISIT (OUTPATIENT)
Dept: PRIMARY CARE CLINIC | Age: 88
End: 2024-12-17
Payer: MEDICARE

## 2024-12-17 VITALS
WEIGHT: 115 LBS | TEMPERATURE: 99 F | BODY MASS INDEX: 20.38 KG/M2 | SYSTOLIC BLOOD PRESSURE: 110 MMHG | DIASTOLIC BLOOD PRESSURE: 62 MMHG | RESPIRATION RATE: 15 BRPM | HEIGHT: 63 IN

## 2024-12-17 DIAGNOSIS — I48.0 PAROXYSMAL ATRIAL FIBRILLATION (HCC): Primary | ICD-10-CM

## 2024-12-17 DIAGNOSIS — E78.5 DYSLIPIDEMIA: ICD-10-CM

## 2024-12-17 DIAGNOSIS — I10 PRIMARY HYPERTENSION: ICD-10-CM

## 2024-12-17 DIAGNOSIS — I63.9 CEREBRAL INFARCTION, UNSPECIFIED MECHANISM (HCC): ICD-10-CM

## 2024-12-17 DIAGNOSIS — I25.10 CORONARY ARTERY DISEASE INVOLVING NATIVE HEART WITHOUT ANGINA PECTORIS, UNSPECIFIED VESSEL OR LESION TYPE: ICD-10-CM

## 2024-12-17 PROCEDURE — 99214 OFFICE O/P EST MOD 30 MIN: CPT | Performed by: FAMILY MEDICINE

## 2024-12-17 PROCEDURE — 1123F ACP DISCUSS/DSCN MKR DOCD: CPT | Performed by: FAMILY MEDICINE

## 2024-12-17 RX ORDER — POLYETHYLENE GLYCOL 3350 17 G/17G
17 POWDER, FOR SOLUTION ORAL DAILY
COMMUNITY
Start: 2024-09-21

## 2024-12-17 SDOH — ECONOMIC STABILITY: FOOD INSECURITY: WITHIN THE PAST 12 MONTHS, THE FOOD YOU BOUGHT JUST DIDN'T LAST AND YOU DIDN'T HAVE MONEY TO GET MORE.: NEVER TRUE

## 2024-12-17 SDOH — ECONOMIC STABILITY: INCOME INSECURITY: HOW HARD IS IT FOR YOU TO PAY FOR THE VERY BASICS LIKE FOOD, HOUSING, MEDICAL CARE, AND HEATING?: NOT HARD AT ALL

## 2024-12-17 SDOH — ECONOMIC STABILITY: FOOD INSECURITY: WITHIN THE PAST 12 MONTHS, YOU WORRIED THAT YOUR FOOD WOULD RUN OUT BEFORE YOU GOT MONEY TO BUY MORE.: NEVER TRUE

## 2024-12-17 ASSESSMENT — ENCOUNTER SYMPTOMS
NAUSEA: 0
COUGH: 0
SORE THROAT: 0
SHORTNESS OF BREATH: 0
BLOOD IN STOOL: 0
ABDOMINAL PAIN: 0
VOMITING: 0

## 2024-12-17 NOTE — PROGRESS NOTES
Chief Complaint   Patient presents with    Hypertension    Medication Refill    Follow-up         Katherine Vásquez is a 89 y.o. female who presents for routine visit    Pt had another CVA several months ago with L arm/leg weakness and R facial droop along with trouble swallowing and had temporary feeding tube. Pt was admitted and found to be in new onset Afib and was started on eliquis medication.  Prior to discharge pt had a swallowing study which showed no aspiration and pt is tolerating a regular diet.  Pt did do 3 weeks of therapy at [facility] and missed her follow up appointment with Neurology      Pt has a hx of HTN, CAD, dyslipidemia and TIA's and is on norvasc, lipitor and coreg medications. Pt has been eating a very healthy mediterranean diet       Pt did not do COVID vaccine yet     Pt has a hx of Alzheimer's dementia and used to be on aricept medication in the past     Pt is a nonsmoker and denies alcohol use      Review of Systems   Constitutional:  Negative for fever.   HENT:  Negative for congestion, nosebleeds and sore throat.    Respiratory:  Negative for cough and shortness of breath.    Cardiovascular:  Positive for leg swelling (at times). Negative for chest pain and palpitations.   Gastrointestinal:  Negative for abdominal pain, blood in stool, nausea and vomiting.        Negative melena or indigestion   Endocrine: Negative for polyuria.   Genitourinary:  Negative for dysuria and hematuria.   Musculoskeletal:  Negative for arthralgias.   Skin:  Negative for rash.   Neurological:  Positive for weakness (mild in L upper and lower extremity) and headaches. Negative for dizziness, seizures, syncope and speech difficulty.   Psychiatric/Behavioral:  Negative for dysphoric mood. The patient is not nervous/anxious.          Vitals:    12/17/24 1432   BP: 110/62   Resp: 15   Temp: 99 °F (37.2 °C)     Pulse = 92    Physical Exam  Vitals reviewed.   Constitutional:       General: She is not in acute

## 2024-12-20 ENCOUNTER — TELEPHONE (OUTPATIENT)
Dept: PRIMARY CARE CLINIC | Age: 88
End: 2024-12-20

## 2024-12-20 NOTE — TELEPHONE ENCOUNTER
Blank\" fax from [facility]     Scanned into       Original copy placed in provider basket

## 2025-04-07 ENCOUNTER — OFFICE VISIT (OUTPATIENT)
Age: 89
End: 2025-04-07
Payer: MEDICARE

## 2025-04-07 VITALS — DIASTOLIC BLOOD PRESSURE: 64 MMHG | HEART RATE: 71 BPM | OXYGEN SATURATION: 98 % | SYSTOLIC BLOOD PRESSURE: 119 MMHG

## 2025-04-07 DIAGNOSIS — I63.9 CEREBROVASCULAR ACCIDENT (CVA), UNSPECIFIED MECHANISM (HCC): ICD-10-CM

## 2025-04-07 DIAGNOSIS — I48.92 ATRIAL FIBRILLATION AND FLUTTER: Primary | ICD-10-CM

## 2025-04-07 DIAGNOSIS — G30.9 DEMENTIA DUE TO ALZHEIMER'S DISEASE (HCC): ICD-10-CM

## 2025-04-07 DIAGNOSIS — F02.80 DEMENTIA DUE TO ALZHEIMER'S DISEASE (HCC): ICD-10-CM

## 2025-04-07 DIAGNOSIS — Z78.9 ACTIVITY OF DAILY LIVING ALTERATION: ICD-10-CM

## 2025-04-07 DIAGNOSIS — I25.10 CORONARY ARTERY DISEASE INVOLVING NATIVE CORONARY ARTERY OF NATIVE HEART WITHOUT ANGINA PECTORIS: ICD-10-CM

## 2025-04-07 DIAGNOSIS — I10 HTN (HYPERTENSION), BENIGN: ICD-10-CM

## 2025-04-07 DIAGNOSIS — I48.91 ATRIAL FIBRILLATION AND FLUTTER: Primary | ICD-10-CM

## 2025-04-07 PROCEDURE — 1159F MED LIST DOCD IN RCRD: CPT | Performed by: INTERNAL MEDICINE

## 2025-04-07 PROCEDURE — 1123F ACP DISCUSS/DSCN MKR DOCD: CPT | Performed by: INTERNAL MEDICINE

## 2025-04-07 PROCEDURE — 1160F RVW MEDS BY RX/DR IN RCRD: CPT | Performed by: INTERNAL MEDICINE

## 2025-04-07 PROCEDURE — 99203 OFFICE O/P NEW LOW 30 MIN: CPT | Performed by: INTERNAL MEDICINE

## 2025-04-07 SDOH — ECONOMIC STABILITY: FOOD INSECURITY: WITHIN THE PAST 12 MONTHS, THE FOOD YOU BOUGHT JUST DIDN'T LAST AND YOU DIDN'T HAVE MONEY TO GET MORE.: NEVER TRUE

## 2025-04-07 SDOH — ECONOMIC STABILITY: FOOD INSECURITY: WITHIN THE PAST 12 MONTHS, YOU WORRIED THAT YOUR FOOD WOULD RUN OUT BEFORE YOU GOT MONEY TO BUY MORE.: NEVER TRUE

## 2025-04-07 ASSESSMENT — PATIENT HEALTH QUESTIONNAIRE - PHQ9: DEPRESSION UNABLE TO ASSESS: FUNCTIONAL CAPACITY MOTIVATION LIMITS ACCURACY

## 2025-04-07 NOTE — PROGRESS NOTES
Katherine Vásquez (:  1930) is a 89 y.o. female,New patient, here for evaluation of the following chief complaint(s):  New Patient      ASSESSMENT/PLAN:  1. Atrial fibrillation and flutter  Assessment & Plan:     Is on low-dose eliquis (age and GFR) and carvedilol, HR is appropriate today.  2. 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 Another process. At any rate she is no longer able to answer questions with accuracy.  Her grandson is states he has POA, he will send paperwork for this.  Orders:  -     Non [facility] - External Referral To Home Health  4. Cerebrovascular accident (CVA), unspecified mechanism (HCC)  Assessment & Plan:     Continue ASA and atorvastatin for life  5. Coronary artery disease involving native coronary artery of native heart without angina pectoris  Assessment & Plan:     Continue ASA and atorvastatin for life  6. Activity of daily living alteration  -     Non [facility] - External Referral To Home Health      Return in about 6 weeks (around 2025) for Dementia, and goals of care..    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  She is not able to meaningfully answer most questions. Her grandson answers most questions. She is able to deny any complaints today, and states she feels \"much better.\"  Her grandson states he has POA, he was asked to provide a copy for our records.     She is not able complete her own ADLs. She needs assistance with bathing, eating, dressing, self-hygiene, assistance with mobility and toileting. She requires the assistance of another person to leave her home and this is a

## 2025-04-07 NOTE — ASSESSMENT & PLAN NOTE
Unclear to me how much is related to prior strokes vs. Another process. At any rate she is no longer able to answer questions with accuracy.  Her grandson is states he has POA, he will send paperwork for this.

## 2025-04-08 ENCOUNTER — TELEPHONE (OUTPATIENT)
Age: 89
End: 2025-04-08

## 2025-04-08 NOTE — TELEPHONE ENCOUNTER
[facility] and was advised referral will be faxed to office for completion.

## 2025-04-08 NOTE — TELEPHONE ENCOUNTER
[facility] [phone] ([facility]) and was advised PCP is listed as Sara Coon

## 2025-04-08 NOTE — TELEPHONE ENCOUNTER
Advised pt [facility] is faxing a referral to be signed. Nothing else needed at this time

## 2025-04-08 NOTE — TELEPHONE ENCOUNTER
Eugene wilson [facility] advised Dr. Arrieta needs to contact them and provide insurance info. Pt steve gave phone # as [phone]    Sara Coon

## 2025-05-15 ENCOUNTER — HOSPITAL ENCOUNTER (OUTPATIENT)
Age: 89
Setting detail: OBSERVATION
Discharge: HOME HEALTH | End: 2025-05-17
Attending: EMERGENCY MEDICINE | Admitting: INTERNAL MEDICINE
Payer: MEDICARE

## 2025-05-15 ENCOUNTER — APPOINTMENT (OUTPATIENT)
Dept: GENERAL RADIOLOGY | Age: 89
End: 2025-05-15
Payer: MEDICARE

## 2025-05-15 ENCOUNTER — APPOINTMENT (OUTPATIENT)
Dept: CT IMAGING | Age: 89
End: 2025-05-15
Payer: MEDICARE

## 2025-05-15 DIAGNOSIS — R29.90 STROKE-LIKE SYMPTOMS: ICD-10-CM

## 2025-05-15 DIAGNOSIS — I63.011 CEREBROVASCULAR ACCIDENT (CVA) DUE TO THROMBOSIS OF RIGHT VERTEBRAL ARTERY (HCC): ICD-10-CM

## 2025-05-15 DIAGNOSIS — R29.810 FACIAL DROOP: Primary | ICD-10-CM

## 2025-05-15 DIAGNOSIS — Z79.01 ANTICOAGULATED: ICD-10-CM

## 2025-05-15 PROBLEM — G45.9 TIA (TRANSIENT ISCHEMIC ATTACK): Status: ACTIVE | Noted: 2025-05-15

## 2025-05-15 LAB
ALBUMIN SERPL-MCNC: 3.8 G/DL (ref 3.4–5)
ALBUMIN/GLOB SERPL: 0.9 {RATIO} (ref 1.1–2.2)
ALP SERPL-CCNC: 164 U/L (ref 40–129)
ALT SERPL-CCNC: 11 U/L (ref 10–40)
ANION GAP SERPL CALCULATED.3IONS-SCNC: 12 MMOL/L (ref 3–16)
APTT BLD: 21.2 SEC (ref 22.1–36.4)
AST SERPL-CCNC: 25 U/L (ref 15–37)
BACTERIA URNS QL MICRO: ABNORMAL /HPF
BASOPHILS # BLD: 0 K/UL (ref 0–0.2)
BASOPHILS NFR BLD: 0 %
BILIRUB SERPL-MCNC: 0.5 MG/DL (ref 0–1)
BILIRUB UR QL STRIP.AUTO: NEGATIVE
BUN SERPL-MCNC: 23 MG/DL (ref 7–20)
CALCIUM SERPL-MCNC: 9.2 MG/DL (ref 8.3–10.6)
CHLORIDE SERPL-SCNC: 106 MMOL/L (ref 99–110)
CLARITY UR: CLEAR
CO2 SERPL-SCNC: 24 MMOL/L (ref 21–32)
COLOR UR: YELLOW
CREAT SERPL-MCNC: 1 MG/DL (ref 0.6–1.2)
DEPRECATED RDW RBC AUTO: 15.2 % (ref 12.4–15.4)
EOSINOPHIL # BLD: 0 K/UL (ref 0–0.6)
EOSINOPHIL NFR BLD: 0 %
EPI CELLS #/AREA URNS AUTO: 1 /HPF (ref 0–5)
GFR SERPLBLD CREATININE-BSD FMLA CKD-EPI: 52 ML/MIN/{1.73_M2}
GLUCOSE SERPL-MCNC: 144 MG/DL (ref 70–99)
GLUCOSE UR STRIP.AUTO-MCNC: NEGATIVE MG/DL
HCT VFR BLD AUTO: 43.4 % (ref 36–48)
HGB BLD-MCNC: 14.5 G/DL (ref 12–16)
HGB UR QL STRIP.AUTO: ABNORMAL
HYALINE CASTS #/AREA URNS AUTO: 0 /LPF (ref 0–8)
INR PPP: 1.07 (ref 0.85–1.15)
KETONES UR STRIP.AUTO-MCNC: NEGATIVE MG/DL
LEUKOCYTE ESTERASE UR QL STRIP.AUTO: ABNORMAL
LYMPHOCYTES # BLD: 1 K/UL (ref 1–5.1)
LYMPHOCYTES NFR BLD: 8 %
MAGNESIUM SERPL-MCNC: 1.88 MG/DL (ref 1.8–2.4)
MCH RBC QN AUTO: 30.4 PG (ref 26–34)
MCHC RBC AUTO-ENTMCNC: 33.4 G/DL (ref 31–36)
MCV RBC AUTO: 91.1 FL (ref 80–100)
MONOCYTES # BLD: 0.4 K/UL (ref 0–1.3)
MONOCYTES NFR BLD: 3 %
NEUTROPHILS # BLD: 11.7 K/UL (ref 1.7–7.7)
NEUTROPHILS NFR BLD: 88 %
NEUTS BAND NFR BLD MANUAL: 1 % (ref 0–7)
NITRITE UR QL STRIP.AUTO: NEGATIVE
NT-PROBNP SERPL-MCNC: 1899 PG/ML (ref 0–449)
PH UR STRIP.AUTO: 7.5 [PH] (ref 5–8)
PLATELET # BLD AUTO: 174 K/UL (ref 135–450)
PLATELET BLD QL SMEAR: ADEQUATE
PMV BLD AUTO: 10.1 FL (ref 5–10.5)
POTASSIUM SERPL-SCNC: 3.4 MMOL/L (ref 3.5–5.1)
PROT SERPL-MCNC: 8 G/DL (ref 6.4–8.2)
PROT UR STRIP.AUTO-MCNC: 100 MG/DL
PROTHROMBIN TIME: 14.1 SEC (ref 11.9–14.9)
RBC # BLD AUTO: 4.77 M/UL (ref 4–5.2)
RBC CLUMPS #/AREA URNS AUTO: 1 /HPF (ref 0–4)
SLIDE REVIEW: ABNORMAL
SODIUM SERPL-SCNC: 142 MMOL/L (ref 136–145)
SP GR UR STRIP.AUTO: >=1.03 (ref 1–1.03)
TROPONIN, HIGH SENSITIVITY: 12 NG/L (ref 0–14)
TROPONIN, HIGH SENSITIVITY: <6 NG/L (ref 0–14)
UA COMPLETE W REFLEX CULTURE PNL UR: YES
UA DIPSTICK W REFLEX MICRO PNL UR: YES
URN SPEC COLLECT METH UR: ABNORMAL
UROBILINOGEN UR STRIP-ACNC: 0.2 E.U./DL
WBC # BLD AUTO: 13.1 K/UL (ref 4–11)
WBC #/AREA URNS AUTO: 118 /HPF (ref 0–5)

## 2025-05-15 PROCEDURE — 70450 CT HEAD/BRAIN W/O DYE: CPT

## 2025-05-15 PROCEDURE — 6360000004 HC RX CONTRAST MEDICATION: Performed by: PHYSICIAN ASSISTANT

## 2025-05-15 PROCEDURE — 83036 HEMOGLOBIN GLYCOSYLATED A1C: CPT

## 2025-05-15 PROCEDURE — 2500000003 HC RX 250 WO HCPCS: Performed by: HOSPITALIST

## 2025-05-15 PROCEDURE — 80053 COMPREHEN METABOLIC PANEL: CPT

## 2025-05-15 PROCEDURE — 99285 EMERGENCY DEPT VISIT HI MDM: CPT

## 2025-05-15 PROCEDURE — 83735 ASSAY OF MAGNESIUM: CPT

## 2025-05-15 PROCEDURE — 85730 THROMBOPLASTIN TIME PARTIAL: CPT

## 2025-05-15 PROCEDURE — 84484 ASSAY OF TROPONIN QUANT: CPT

## 2025-05-15 PROCEDURE — G0378 HOSPITAL OBSERVATION PER HR: HCPCS

## 2025-05-15 PROCEDURE — 70496 CT ANGIOGRAPHY HEAD: CPT

## 2025-05-15 PROCEDURE — 93005 ELECTROCARDIOGRAM TRACING: CPT | Performed by: PHYSICIAN ASSISTANT

## 2025-05-15 PROCEDURE — 6370000000 HC RX 637 (ALT 250 FOR IP): Performed by: HOSPITALIST

## 2025-05-15 PROCEDURE — 85025 COMPLETE CBC W/AUTO DIFF WBC: CPT

## 2025-05-15 PROCEDURE — 87086 URINE CULTURE/COLONY COUNT: CPT

## 2025-05-15 PROCEDURE — 83880 ASSAY OF NATRIURETIC PEPTIDE: CPT

## 2025-05-15 PROCEDURE — 85610 PROTHROMBIN TIME: CPT

## 2025-05-15 PROCEDURE — 71045 X-RAY EXAM CHEST 1 VIEW: CPT

## 2025-05-15 PROCEDURE — 81001 URINALYSIS AUTO W/SCOPE: CPT

## 2025-05-15 PROCEDURE — 36415 COLL VENOUS BLD VENIPUNCTURE: CPT

## 2025-05-15 RX ORDER — ASPIRIN 81 MG/1
324 TABLET, CHEWABLE ORAL ONCE
Status: DISCONTINUED | OUTPATIENT
Start: 2025-05-15 | End: 2025-05-15

## 2025-05-15 RX ORDER — SODIUM CHLORIDE 9 MG/ML
INJECTION, SOLUTION INTRAVENOUS PRN
Status: DISCONTINUED | OUTPATIENT
Start: 2025-05-15 | End: 2025-05-17 | Stop reason: HOSPADM

## 2025-05-15 RX ORDER — POLYETHYLENE GLYCOL 3350 17 G/17G
17 POWDER, FOR SOLUTION ORAL DAILY PRN
Status: DISCONTINUED | OUTPATIENT
Start: 2025-05-15 | End: 2025-05-17 | Stop reason: HOSPADM

## 2025-05-15 RX ORDER — CARVEDILOL 3.12 MG/1
3.12 TABLET ORAL 2 TIMES DAILY WITH MEALS
Status: DISCONTINUED | OUTPATIENT
Start: 2025-05-15 | End: 2025-05-17 | Stop reason: HOSPADM

## 2025-05-15 RX ORDER — ASPIRIN 81 MG/1
81 TABLET ORAL DAILY
Status: DISCONTINUED | OUTPATIENT
Start: 2025-05-15 | End: 2025-05-15

## 2025-05-15 RX ORDER — SODIUM CHLORIDE 0.9 % (FLUSH) 0.9 %
5-40 SYRINGE (ML) INJECTION EVERY 12 HOURS SCHEDULED
Status: DISCONTINUED | OUTPATIENT
Start: 2025-05-15 | End: 2025-05-17 | Stop reason: HOSPADM

## 2025-05-15 RX ORDER — SODIUM CHLORIDE 0.9 % (FLUSH) 0.9 %
5-40 SYRINGE (ML) INJECTION PRN
Status: DISCONTINUED | OUTPATIENT
Start: 2025-05-15 | End: 2025-05-17 | Stop reason: HOSPADM

## 2025-05-15 RX ORDER — AMLODIPINE BESYLATE 5 MG/1
10 TABLET ORAL DAILY
Status: DISCONTINUED | OUTPATIENT
Start: 2025-05-15 | End: 2025-05-15

## 2025-05-15 RX ORDER — AMLODIPINE BESYLATE 5 MG/1
5 TABLET ORAL DAILY
Status: ON HOLD | COMMUNITY
End: 2025-05-17 | Stop reason: HOSPADM

## 2025-05-15 RX ORDER — ATORVASTATIN CALCIUM 80 MG/1
40 TABLET, FILM COATED ORAL DAILY
Status: DISCONTINUED | OUTPATIENT
Start: 2025-05-15 | End: 2025-05-15

## 2025-05-15 RX ORDER — IOPAMIDOL 755 MG/ML
75 INJECTION, SOLUTION INTRAVASCULAR
Status: COMPLETED | OUTPATIENT
Start: 2025-05-15 | End: 2025-05-15

## 2025-05-15 RX ORDER — ONDANSETRON 4 MG/1
4 TABLET, ORALLY DISINTEGRATING ORAL EVERY 8 HOURS PRN
Status: DISCONTINUED | OUTPATIENT
Start: 2025-05-15 | End: 2025-05-17 | Stop reason: HOSPADM

## 2025-05-15 RX ORDER — ONDANSETRON 2 MG/ML
4 INJECTION INTRAMUSCULAR; INTRAVENOUS EVERY 6 HOURS PRN
Status: DISCONTINUED | OUTPATIENT
Start: 2025-05-15 | End: 2025-05-17 | Stop reason: HOSPADM

## 2025-05-15 RX ADMIN — IOPAMIDOL 75 ML: 755 INJECTION, SOLUTION INTRAVENOUS at 11:00

## 2025-05-15 RX ADMIN — APIXABAN 2.5 MG: 5 TABLET, FILM COATED ORAL at 20:05

## 2025-05-15 RX ADMIN — SODIUM CHLORIDE, PRESERVATIVE FREE 10 ML: 5 INJECTION INTRAVENOUS at 20:38

## 2025-05-15 RX ADMIN — CARVEDILOL 3.12 MG: 3.12 TABLET, FILM COATED ORAL at 18:05

## 2025-05-15 ASSESSMENT — PAIN SCALES - WONG BAKER
WONGBAKER_NUMERICALRESPONSE: HURTS LITTLE MORE
WONGBAKER_NUMERICALRESPONSE: HURTS LITTLE MORE

## 2025-05-15 ASSESSMENT — ENCOUNTER SYMPTOMS
COLOR CHANGE: 0
RESPIRATORY NEGATIVE: 1
COUGH: 0
BACK PAIN: 0
ABDOMINAL PAIN: 0
WHEEZING: 0
PHOTOPHOBIA: 0
STRIDOR: 0
SHORTNESS OF BREATH: 0
ABDOMINAL DISTENTION: 0
NAUSEA: 0
CHEST TIGHTNESS: 0
CONSTIPATION: 0
VOMITING: 0
DIARRHEA: 0

## 2025-05-15 NOTE — ED NOTES
Patient Name: Katherine Vásquez  : 1930 89 y.o.  MRN: [id number]  ED Room #: ED-0014/14     Chief complaint:   Chief Complaint   Patient presents with    Aphasia     Pt via EMS from home, family said pt was blank staring for three minutes and wouldn't respond, then went back to normal, has baseine right side facial droop and left sided weakness from prior strokes and hx of dementia      Hospital Problem/Diagnosis:   Hospital Problems           Last Modified POA    * (Principal) TIA (transient ischemic attack) 5/15/2025 Yes         O2 Flow Rate:    (if applicable)  Cardiac Rhythm:   (if applicable)  Active LDA's:   Peripheral IV 05/15/25 Left Forearm (Active)   Site Assessment Clean, dry & intact 05/15/25 1057            How does patient ambulate? Front Wheeled Walker    2. How does patient take pills? Unknown, no oral medications were given in the Emergency Department    3. Is patient alert? Drowsy, but responds to voice    4. Is patient oriented? Confused    5.   Patient arrived from:  home  Facility Name: ___________________________________________    6. If patient is disoriented or from a Skill Nursing Facility has family been notified of admission? Yes family at bedside    7. Patient belongings? Belongings: Clothing    Disposition of belongings? Kept with Patient     8. Any specific patient or family belongings/needs/dynamics?   a. na    9. Miscellaneous comments/pending orders?  a. Unable to perform swallow screen at this time, pt drowsy.       If there are any additional questions please reach out to the Emergency Department.

## 2025-05-15 NOTE — PROGRESS NOTES
Dinner order placed for patient and their guest via phone call by this RN after diet order was placed by Rogelio MCHUGH. Nutrition states they will send the trays up soon.

## 2025-05-15 NOTE — PROGRESS NOTES
Patient seen in ED, room 14.  Admission completed through personal belongings.  IP nurse will need to begin with psychosocial review and complete the admission including the 4 Eyes Assessment, Immunizations, Vaccines, Rights and Responsibilities, Orientation to room, Plan of Care, Education/Learning Assessment and Education Plan, white board, height and weight, pain assessment and head to toe assessment.  Patient is sleeping throughout the admission process, but did respond to her granddaughter who answered the questions.  She is being admitted for TIA.      Home Medication reconciliation has been completed by Pharmacy Staff, Brenda.      All family's questions answered.

## 2025-05-15 NOTE — PROGRESS NOTES
Medication history completed telephonically and obtained by Pharmacy Technician Brenda Givens and was completed based on information available during current patient encounter.     Allergies: Prochlorperazine    Prior to Admission Medications       Med List Status: Complete Set By: Brneda Givens at 05/15/2025  4:02 PM          Taking? Last Dose Informant Start Date End Date Provider      amLODIPine (NORVASC) 10 MG tablet  Not Taking  --  08/09/24  --  Sabas Washington MD      TAKE 1 TABLET BY MOUTH EVERY DAY     Patient not taking: Reported on 5/15/2025     amLODIPine (NORVASC) 5 MG tablet  5/14/2025  --  --  --  Xiomara Ndiaye MD      Take 1 tablet by mouth daily     apixaban (ELIQUIS) 2.5 MG TABS tablet  5/14/2025  --  05/23/24  --  Roseline Olson DO      Take 1 tablet by mouth 2 times daily     aspirin (ECOTRIN LOW STRENGTH) 81 MG EC tablet  Not Taking  --  02/16/22  --  Sabas Washington MD      Take 1 tablet by mouth daily     Patient not taking: Reported on 5/15/2025     atorvastatin (LIPITOR) 40 MG tablet  Not Taking  --  08/09/24  --  Sabas Washington MD      TAKE 1 TABLET BY MOUTH EVERY DAY     Patient not taking: Reported on 5/15/2025     carvedilol (COREG) 3.125 MG tablet  5/14/2025  --  12/16/24  --  Sabas Washington MD      TAKE 1 TABLET BY MOUTH TWICE A DAY WITH FOOD     Vitamin D (CHOLECALCIFEROL) 25 MCG (1000 UT) TABS tablet  Not Taking  --  03/14/22  --  Evan Mcguire MD      TAKE 2 TABLETS BY MOUTH EVERY DAY     Patient not taking: Reported on 5/15/2025          Medication History Obtained From:  Other: Patient's grandson (phone)    Medication History Summary  Medications ADDED: 0 -   Medications FLAGGED FOR REMOVAL: 4 -   Medications MODIFIED: 0 -   Medications TAKING DIFFERENTLY THAN PRESCRIBED: 0 -   Total Med List Updates Made: 4  Time Spent:  10  minutes    Brenda Givens  5/15/2025 4:03 PM

## 2025-05-15 NOTE — ED PROVIDER NOTES
[facility] EMERGENCY DEPARTMENT  EMERGENCY DEPARTMENT ENCOUNTER        Pt Name: Katherine Vásquez  MRN: [id number]  Birthdate 11/8/1930  Date of evaluation: 5/15/2025  Provider: MAURILIO Bowen  PCP: Chandra Arrieta MD  Note Started: 10:31 AM EDT 5/15/25       I have seen and evaluated this patient with my supervising physician Alia Landeros,*.      CHIEF COMPLAINT       Chief Complaint   Patient presents with    Aphasia     Pt via EMS from home, family said pt was blank staring for three minutes and wouldn't respond, then went back to normal, has baseine right side facial droop and left sided weakness from prior strokes and hx of dementia        HISTORY OF PRESENT ILLNESS: 1 or more Elements     History From: Family/patient  Limitations to history : Altered Mental Status    Katherine Vásquez is a 89 y.o. female with past medical history of atrial fibrillation on Eliquis, dementia, hypertension, hyperlipidemia and CVA who presents ED with complaint of strokelike symptoms.  Family reports patient had major stroke about a year ago.  They report she has some residual deficits from the stroke.  They report she has some aphasia/word speech, generalized weakness, difficulty ambulating and worsening memory from stroke.  They report she has not dementia at baseline.  They report she is at her normal mental status this morning.  They report around 9:00 she got up to use the restroom.  They report while she was using the restroom she had to get weak.  They reports she counted slumped over but did not fall to the ground.  She reports that she appeared to have right-sided facial droop and was not speaking to them for about 3 minutes.  There is no seizure-like activity.  Family reports she is still not back to baseline.  They report right-sided facial droop which is new.  She is anticoagulated on Eliquis and family reports has been taking as prescribed.  Patient denies any headache.  No reported visual

## 2025-05-16 ENCOUNTER — APPOINTMENT (OUTPATIENT)
Dept: MRI IMAGING | Age: 89
End: 2025-05-16
Payer: MEDICARE

## 2025-05-16 ENCOUNTER — APPOINTMENT (OUTPATIENT)
Age: 89
End: 2025-05-16
Attending: HOSPITALIST
Payer: MEDICARE

## 2025-05-16 PROBLEM — Z79.01 ANTICOAGULATED: Status: ACTIVE | Noted: 2025-05-16

## 2025-05-16 PROBLEM — R29.90 STROKE-LIKE SYMPTOMS: Status: ACTIVE | Noted: 2025-05-16

## 2025-05-16 PROBLEM — E43 SEVERE MALNUTRITION: Chronic | Status: ACTIVE | Noted: 2025-05-16

## 2025-05-16 PROBLEM — E43 SEVERE MALNUTRITION: Status: ACTIVE | Noted: 2025-05-16

## 2025-05-16 LAB
ANION GAP SERPL CALCULATED.3IONS-SCNC: 10 MMOL/L (ref 3–16)
BACTERIA UR CULT: NORMAL
BUN SERPL-MCNC: 19 MG/DL (ref 7–20)
CALCIUM SERPL-MCNC: 9.1 MG/DL (ref 8.3–10.6)
CHLORIDE SERPL-SCNC: 109 MMOL/L (ref 99–110)
CHOLEST SERPL-MCNC: 121 MG/DL (ref 0–199)
CO2 SERPL-SCNC: 24 MMOL/L (ref 21–32)
CREAT SERPL-MCNC: 0.9 MG/DL (ref 0.6–1.2)
DEPRECATED RDW RBC AUTO: 15 % (ref 12.4–15.4)
ECHO AO ASC DIAM: 3.2 CM
ECHO AO ASCENDING AORTA INDEX: 2.05 CM/M2
ECHO AO ROOT DIAM: 3.3 CM
ECHO AO ROOT INDEX: 2.12 CM/M2
ECHO AR MAX VEL PISA: 4.1 M/S
ECHO AV AREA PEAK VELOCITY: 2.6 CM2
ECHO AV AREA VTI: 2.8 CM2
ECHO AV AREA/BSA PEAK VELOCITY: 1.7 CM2/M2
ECHO AV AREA/BSA VTI: 1.8 CM2/M2
ECHO AV MEAN GRADIENT: 3 MMHG
ECHO AV MEAN VELOCITY: 0.9 M/S
ECHO AV PEAK GRADIENT: 7 MMHG
ECHO AV PEAK VELOCITY: 1.3 M/S
ECHO AV REGURGITANT PHT: 543 MS
ECHO AV VELOCITY RATIO: 1
ECHO AV VTI: 23.3 CM
ECHO BSA: 1.56 M2
ECHO EST RA PRESSURE: 3 MMHG
ECHO LA AREA 2C: 24.3 CM2
ECHO LA AREA 4C: 15.8 CM2
ECHO LA MAJOR AXIS: 5.9 CM
ECHO LA MINOR AXIS: 7 CM
ECHO LA VOL BP: 52 ML (ref 22–52)
ECHO LA VOL MOD A2C: 69 ML (ref 22–52)
ECHO LA VOL MOD A4C: 34 ML (ref 22–52)
ECHO LA VOL/BSA BIPLANE: 33 ML/M2 (ref 16–34)
ECHO LA VOLUME INDEX MOD A2C: 44 ML/M2 (ref 16–34)
ECHO LA VOLUME INDEX MOD A4C: 22 ML/M2 (ref 16–34)
ECHO LV EDV A2C: 26 ML
ECHO LV EDV A4C: 32 ML
ECHO LV EDV INDEX A4C: 21 ML/M2
ECHO LV EDV NDEX A2C: 17 ML/M2
ECHO LV EF PHYSICIAN: 65 %
ECHO LV EJECTION FRACTION A2C: 45 %
ECHO LV EJECTION FRACTION A4C: 70 %
ECHO LV EJECTION FRACTION BIPLANE: 59 % (ref 55–100)
ECHO LV ESV A2C: 14 ML
ECHO LV ESV A4C: 10 ML
ECHO LV ESV INDEX A2C: 9 ML/M2
ECHO LV ESV INDEX A4C: 6 ML/M2
ECHO LV FRACTIONAL SHORTENING: 44 % (ref 28–44)
ECHO LV INTERNAL DIMENSION DIASTOLE INDEX: 2.5 CM/M2
ECHO LV INTERNAL DIMENSION DIASTOLIC: 3.9 CM (ref 3.9–5.3)
ECHO LV INTERNAL DIMENSION SYSTOLIC INDEX: 1.41 CM/M2
ECHO LV INTERNAL DIMENSION SYSTOLIC: 2.2 CM
ECHO LV IVSD: 0.9 CM (ref 0.6–0.9)
ECHO LV MASS 2D: 113.6 G (ref 67–162)
ECHO LV MASS INDEX 2D: 72.8 G/M2 (ref 43–95)
ECHO LV POSTERIOR WALL DIASTOLIC: 1 CM (ref 0.6–0.9)
ECHO LV RELATIVE WALL THICKNESS RATIO: 0.51
ECHO LVOT AREA: 2.5 CM2
ECHO LVOT AV VTI INDEX: 1.08
ECHO LVOT DIAM: 1.8 CM
ECHO LVOT MEAN GRADIENT: 3 MMHG
ECHO LVOT PEAK GRADIENT: 7 MMHG
ECHO LVOT PEAK VELOCITY: 1.3 M/S
ECHO LVOT STROKE VOLUME INDEX: 41.1 ML/M2
ECHO LVOT SV: 64.1 ML
ECHO LVOT VTI: 25.2 CM
ECHO MV AREA VTI: 3.5 CM2
ECHO MV E VELOCITY: 0.95 M/S
ECHO MV LVOT VTI INDEX: 0.73
ECHO MV MAX VELOCITY: 0.9 M/S
ECHO MV MEAN GRADIENT: 2 MMHG
ECHO MV MEAN VELOCITY: 0.7 M/S
ECHO MV PEAK GRADIENT: 4 MMHG
ECHO MV VTI: 18.4 CM
ECHO RA AREA 4C: 22.1 CM2
ECHO RA END SYSTOLIC VOLUME APICAL 4 CHAMBER INDEX BSA: 43 ML/M2
ECHO RA VOLUME: 67 ML
ECHO RIGHT VENTRICULAR SYSTOLIC PRESSURE (RVSP): 38 MMHG
ECHO RV BASAL DIMENSION: 3.1 CM
ECHO RV FREE WALL PEAK S': 9.8 CM/S
ECHO RV MID DIMENSION: 1.1 CM
ECHO RV TAPSE: 1.3 CM (ref 1.7–?)
ECHO TV REGURGITANT MAX VELOCITY: 2.96 M/S
ECHO TV REGURGITANT PEAK GRADIENT: 35 MMHG
EKG DIAGNOSIS: NORMAL
EKG Q-T INTERVAL: 364 MS
EKG QRS DURATION: 60 MS
EKG QTC CALCULATION (BAZETT): 440 MS
EKG R AXIS: -43 DEGREES
EKG T AXIS: 81 DEGREES
EKG VENTRICULAR RATE: 88 BPM
EST. AVERAGE GLUCOSE BLD GHB EST-MCNC: 116.9 MG/DL
GFR SERPLBLD CREATININE-BSD FMLA CKD-EPI: 59 ML/MIN/{1.73_M2}
GLUCOSE SERPL-MCNC: 103 MG/DL (ref 70–99)
HBA1C MFR BLD: 5.7 %
HCT VFR BLD AUTO: 39.5 % (ref 36–48)
HDLC SERPL-MCNC: 43 MG/DL (ref 40–60)
HGB BLD-MCNC: 13.4 G/DL (ref 12–16)
LDLC SERPL CALC-MCNC: 60 MG/DL
MAGNESIUM SERPL-MCNC: 1.9 MG/DL (ref 1.8–2.4)
MCH RBC QN AUTO: 30.6 PG (ref 26–34)
MCHC RBC AUTO-ENTMCNC: 33.8 G/DL (ref 31–36)
MCV RBC AUTO: 90.5 FL (ref 80–100)
PLATELET # BLD AUTO: 153 K/UL (ref 135–450)
PMV BLD AUTO: 9.1 FL (ref 5–10.5)
POTASSIUM SERPL-SCNC: 3.2 MMOL/L (ref 3.5–5.1)
RBC # BLD AUTO: 4.37 M/UL (ref 4–5.2)
SODIUM SERPL-SCNC: 143 MMOL/L (ref 136–145)
TRIGL SERPL-MCNC: 90 MG/DL (ref 0–150)
VLDLC SERPL CALC-MCNC: 18 MG/DL
WBC # BLD AUTO: 7.6 K/UL (ref 4–11)

## 2025-05-16 PROCEDURE — 6370000000 HC RX 637 (ALT 250 FOR IP): Performed by: NURSE PRACTITIONER

## 2025-05-16 PROCEDURE — APPSS60 APP SPLIT SHARED TIME 46-60 MINUTES

## 2025-05-16 PROCEDURE — 83735 ASSAY OF MAGNESIUM: CPT

## 2025-05-16 PROCEDURE — 96374 THER/PROPH/DIAG INJ IV PUSH: CPT

## 2025-05-16 PROCEDURE — G0378 HOSPITAL OBSERVATION PER HR: HCPCS

## 2025-05-16 PROCEDURE — 93010 ELECTROCARDIOGRAM REPORT: CPT | Performed by: INTERNAL MEDICINE

## 2025-05-16 PROCEDURE — 2500000003 HC RX 250 WO HCPCS: Performed by: HOSPITALIST

## 2025-05-16 PROCEDURE — 93306 TTE W/DOPPLER COMPLETE: CPT | Performed by: INTERNAL MEDICINE

## 2025-05-16 PROCEDURE — 36415 COLL VENOUS BLD VENIPUNCTURE: CPT

## 2025-05-16 PROCEDURE — 99223 1ST HOSP IP/OBS HIGH 75: CPT | Performed by: PSYCHIATRY & NEUROLOGY

## 2025-05-16 PROCEDURE — 2500000003 HC RX 250 WO HCPCS: Performed by: NURSE PRACTITIONER

## 2025-05-16 PROCEDURE — 97166 OT EVAL MOD COMPLEX 45 MIN: CPT

## 2025-05-16 PROCEDURE — 97530 THERAPEUTIC ACTIVITIES: CPT

## 2025-05-16 PROCEDURE — 80061 LIPID PANEL: CPT

## 2025-05-16 PROCEDURE — 80048 BASIC METABOLIC PNL TOTAL CA: CPT

## 2025-05-16 PROCEDURE — 92610 EVALUATE SWALLOWING FUNCTION: CPT

## 2025-05-16 PROCEDURE — APPNB30 APP NON BILLABLE TIME 0-30 MINS

## 2025-05-16 PROCEDURE — 85027 COMPLETE CBC AUTOMATED: CPT

## 2025-05-16 PROCEDURE — 97162 PT EVAL MOD COMPLEX 30 MIN: CPT

## 2025-05-16 PROCEDURE — 6370000000 HC RX 637 (ALT 250 FOR IP): Performed by: HOSPITALIST

## 2025-05-16 PROCEDURE — 6360000002 HC RX W HCPCS: Performed by: NURSE PRACTITIONER

## 2025-05-16 PROCEDURE — 93321 DOPPLER ECHO F-UP/LMTD STD: CPT

## 2025-05-16 RX ORDER — POTASSIUM CHLORIDE 1500 MG/1
40 TABLET, EXTENDED RELEASE ORAL 2 TIMES DAILY WITH MEALS
Status: DISCONTINUED | OUTPATIENT
Start: 2025-05-16 | End: 2025-05-16

## 2025-05-16 RX ADMIN — CARVEDILOL 3.12 MG: 3.12 TABLET, FILM COATED ORAL at 18:00

## 2025-05-16 RX ADMIN — POTASSIUM BICARBONATE 20 MEQ: 782 TABLET, EFFERVESCENT ORAL at 08:31

## 2025-05-16 RX ADMIN — SODIUM CHLORIDE, PRESERVATIVE FREE 10 ML: 5 INJECTION INTRAVENOUS at 08:31

## 2025-05-16 RX ADMIN — SODIUM CHLORIDE, PRESERVATIVE FREE 10 ML: 5 INJECTION INTRAVENOUS at 19:38

## 2025-05-16 RX ADMIN — APIXABAN 2.5 MG: 5 TABLET, FILM COATED ORAL at 19:38

## 2025-05-16 RX ADMIN — APIXABAN 2.5 MG: 5 TABLET, FILM COATED ORAL at 08:31

## 2025-05-16 RX ADMIN — POTASSIUM BICARBONATE 20 MEQ: 782 TABLET, EFFERVESCENT ORAL at 18:00

## 2025-05-16 RX ADMIN — WATER 1000 MG: 1 INJECTION INTRAMUSCULAR; INTRAVENOUS; SUBCUTANEOUS at 19:07

## 2025-05-16 RX ADMIN — CARVEDILOL 3.12 MG: 3.12 TABLET, FILM COATED ORAL at 08:31

## 2025-05-16 NOTE — PROGRESS NOTES
Occupational Therapy    Katherine Vásquez     OT orders received and chart reviewed. Pt currently HEIDI for ECHO. Will follow up with patient as schedule allows.     NIKOS Gómez/OT, OTR/L- [id number]

## 2025-05-16 NOTE — PLAN OF CARE
Problem: Chronic Conditions and Co-morbidities  Goal: Patient's chronic conditions and co-morbidity symptoms are monitored and maintained or improved  Recent Flowsheet Documentation  Taken 5/15/2025 1945 by Jena Mario RN  Care Plan - Patient's Chronic Conditions and Co-Morbidity Symptoms are Monitored and Maintained or Improved: Monitor and assess patient's chronic conditions and comorbid symptoms for stability, deterioration, or improvement     Problem: Discharge Planning  Goal: Discharge to home or other facility with appropriate resources  Recent Flowsheet Documentation  Taken 5/15/2025 1945 by Jena Mario RN  Discharge to home or other facility with appropriate resources: Identify barriers to discharge with patient and caregiver     Problem: Pain  Goal: Verbalizes/displays adequate comfort level or baseline comfort level  Recent Flowsheet Documentation  Taken 5/15/2025 1940 by Jena Mario RN  Verbalizes/displays adequate comfort level or baseline comfort level: Encourage patient to monitor pain and request assistance     Problem: Skin/Tissue Integrity  Goal: Skin integrity remains intact  Description: 1.  Monitor for areas of redness and/or skin breakdown2.  Assess vascular access sites hourly3.  Every 4-6 hours minimum:  Change oxygen saturation probe site4.  Every 4-6 hours:  If on nasal continuous positive airway pressure, respiratory therapy assess nares and determine need for appliance change or resting period  Recent Flowsheet Documentation  Taken 5/15/2025 1945 by Jena Mario RN  Skin Integrity Remains Intact: Monitor for areas of redness and/or skin breakdown     Problem: Neurosensory - Adult  Goal: Remains free of injury related to seizures activity  Recent Flowsheet Documentation  Taken 5/15/2025 1945 by Jena Mario RN  Remains free of injury related to seizure activity: Maintain airway, patient safety  and administer oxygen as ordered     Problem: Cardiovascular -

## 2025-05-16 NOTE — PROGRESS NOTES
Nutrition Note    RECOMMENDATIONS  PO Diet: Regular  ONS: Ensure TID     ASSESSMENT   Nutrition intervention triggered for wt loss & poor appetite.  Dtr @ bedside & states pt usually eats very well & has a good appetite.   lb; per dtr, wt was 130 lb a month ago at physician's office.  No actual wts in hx to verify this, however pt does exhibit signs of muscle & fat tissue wasting, per NFPE.  Likes Ensure & takes at home, 1-2 time per day.  Will add supplements to meal trays & monitor for adequate po intake.       Malnutrition Status: Severe malnutrition  Acute Illness  Findings of the 6 clinical characteristics of malnutrition:  Energy Intake:  Mild decrease in energy intake  Weight Loss:  Greater than 5% over 1 month (7.7% in a month per report)     Body Fat Loss:  Moderate body fat loss Buccal region   Muscle Mass Loss:  Moderate muscle mass loss Temples (temporalis)  Fluid Accumulation:  No fluid accumulation     Strength:  Not Performed      NUTRITION DIAGNOSIS   Severe malnutrition, in context of acute illness or injury related to inadequate protein-energy intake as evidenced by criteria as identified in malnutrition assessment    Goals: PO intake 50% or greater     NUTRITION RELATED FINDINGS  Objective: No edema noted; gluc 103, k+ 3.2  Wounds: None    CURRENT NUTRITION THERAPIES  ADULT DIET; Regular  ADULT ORAL NUTRITION SUPPLEMENT; Breakfast, Lunch, Dinner; Standard High Calorie/High Protein Oral Supplement       PO Intake: Unable to assess   PO Supplement Intake:Unable to assess    ANTHROPOMETRICS  Current Height: 160 cm (5' 3\")  Current Weight - Scale: 54.6 kg (120 lb 5.9 oz)      COMPARATIVE STANDARDS  Total Energy Requirements (kcals/day): 1282     Protein (g):  66- 82g       Fluid (mL/day):  1568    EDUCATION  Education/Counseling not indicated     The patient will be monitored per nutrition standards of care. Consult dietitian if additional nutrition interventions are needed prior to RD

## 2025-05-16 NOTE — PROGRESS NOTES
[facility] - Inpatient Rehabilitation Department   Phone: [phone]    Occupational Therapy    [x] Initial Evaluation            [] Daily Treatment Note         [] Discharge Summary      Patient: Katherine Vásquez   : 1930   MRN: [id number]   Date of Service:  2025    Admitting Diagnosis:  TIA (transient ischemic attack)  Current Admission Summary: Pt is a 89 y/o female who presents to the hospital following an episode in which the pt slumped over, had R sided facial droop, and difficulty speaking for 3 minutes. Of note, pt had a major stroke ~ 1 year ago that left the pt with residual deficits including aphasia, generalized weakness, difficulty ambulating, and worsening memory.                  *CT head: \"No acute intracranial abnormality.\"  Patient chronic CVA   Past Medical History:  has a past medical history of Cerebral artery occlusion with cerebral infarction (HCC), H/O: hysterectomy, Hyperlipidemia, and Hypertension.  Past Surgical History:  has a past surgical history that includes Hysterectomy.    Discharge Recommendations: Katherine Vásquez scored a 7/ on the AM-PAC ADL Inpatient form. Current research shows that an AM-PAC score of 18 or greater is typically associated with a discharge to the patient's home setting. Based on the patient's AM-PAC score, and their current ADL deficits, it is recommended that the patient have 2-3 sessions per week of Occupational Therapy at d/c to increase the patient's independence.  At this time, this patient demonstrates the endurance and safety to discharge home with home health and 24 hour supervision and assist of family and a follow up treatment frequency of 2-3x/wk.   Please see assessment section for further patient specific details.    If patient discharges prior to next session this note will serve as a discharge summary.  Please see below for the latest assessment towards goals.      DME Required For Discharge: mechanical lift, pure wick  for night use   COA referral for possible additional assist at home.     Precautions/Restrictions: high fall risk  Weight Bearing Restrictions: no restrictions      Required Braces/Orthotics: no braces required   [] Right  [] Left  Positional Restrictions:no positional restrictions    Pre-Admission Information - information confirmed with daughter in law, Luan    Lives With: daughter, grandson, also caring for mother at home right now                    Type of Home: house  Home Layout: one level, basement- pt does not access  Home Access:  3 step to enter with handrail.  Handrails are located on L ascending side. - one person assist on stairs to walk up/down steps   Bathroom Layout: tub/shower unit, walk in shower- pt uses the tub shower  Bathroom Equipment: grab bars in shower, shower chair, hand held shower head  Toilet Height: elevated height  Home Equipment: manual wheelchair, upright walker  Transfer Assistance: requires assistance- family provides \"a lot\" of physical assistance for transfers  Ambulation Assistance:requires assistance- family provides physical assistance with use of gait belt and upright walker, pt uses a manual w/c more than the upright walker and is dependent for propelling the w/c  ADL Assistance: requires assistance with bathing, requires assistance with dressing, requires assistance with grooming, requires assistance with feeding, requires assistance with toileting  IADL Assistance: requires assistance with all homemaking tasks  Active :        [] Yes                 [x] No  Hand Dominance: [] Left                 [x] Right  Current Employment: retired.  Occupation:   Hobbies: Travel  Recent Falls: Pt had one assisted fall from her w/c a few days ago  Available Assistance at Discharge: 24 hr physical assistance available    Examination   Vision:   Unable to formally test secondary to cognition  Hearing:   Unable to formally test secondary to cognition  commands  Attention Span: attends with cues to redirect, difficulty attending to directions  Memory: decreased recall of biographical information, decreased recall of precautions, decreased recall of recent events, decreased short term memory, decreased long term memory  Safety Judgement: decreased awareness of need for assistance, decreased awareness of need for safety  Problem Solving: assistance required to generate solutions, assistance required to implement solutions, assistance required to identify errors made, assistance required to correct errors made  Insights: decreased awareness of deficits, not aware of deficits  Initiation: requires cues for all  Sequencing: requires cues for all  Orientation:    oriented to person (name only)  Command Following:   impaired     Education  Barriers To Learning: cognition  Patient Education: patient educated on goals, OT role and benefits, plan of care, precautions, ADL adaptive strategies, transfer training, discharge recommendations  Learning Assessment:  patient is not an independent learner    Assessment  Activity Tolerance: limited by cognition   Impairments Requiring Therapeutic Intervention: decreased functional mobility  Prognosis: guarded  Clinical Assessment: Patient presents with AMS/suspected TIA; Typically assist with all ADLS but does ambulate some (with grandson) and transfers with help of family. Currently max/dep assist with transfers and bed mobility. Patient presents with the above deficits and would benefit from continued skilled OT to address return to PLOF.     Recommend two person SPT or use of maxi move with nursing staff.     Safety Interventions: patient left in chair, chair alarm in place, call light within reach, telesitter in use, and nurse notified    Plan  Frequency: 3-5 x/per week  Current Treatment Recommendations: strengthening, balance training, functional mobility training, transfer training, patient/caregiver education, and

## 2025-05-16 NOTE — CONSULTS
In patient Neurology consult        [facility]      MD Katherine Richardson  11/8/1930    Date of Service: 5/16/2025    Referring Physician: Wendy Malone MD    Most of the history was obtained from detailed chart reviewing and discussion with the patient's family and nurse.  Patient is unable to provide me with accurate history.    Reason for the consult and CC:    HPI:   The patient is a 89 y.o.  years old female hypertension, hyperlipidemia, dementia, atrial fibrillation, remote right MCA stroke and other medical problem who comes to the hospital with speech disturbance, generalized weakness.  Degree severe duration persistent.  Family reports patient was assisted to the bathroom to urinate.  While in the bathroom patient became generalized weakness, patient noted to be slumped over.  No reported convulsions, tongue biting.  Patient was sitting on the toilet so unclear if patient had incontinence, though reported no bowel movement during this time.  Family did note patient having word finding difficulty and right facial droop which they report is new.  EMS was called and patient was brought to the emergency room for further evaluation.  CT head showed no acute intracranial abnormality.  CTA showed no LVO.  Lab workup showed WBC 13.1, UA concerning for UTI, proBNP 1899, potassium 3.4.  Patient was admitted to the hospital further evaluation.  Neurology was consulted and MRI brain was ordered.  P  Family reports prior history of stroke with residual slurred speech.  Patient is mostly nonambulatory and requires assistance with her ADLs.  She currently is on Eliquis 2.5 mg.  Family reports compliance with medication. atient seen this morning she is resting in bed family at bedside.  Eyes open to voice but close immediately.  She appears comfortable and not in acute distress.  Limited examination today due to patient cooperation.  Other review of system is  impairment      Recommendation:     Continue supportive care  MRI brain was ordered.  PT and OT  Speech  Aspiration precaution  Neurochecks  Telemetry  GI and DVT prophylaxis  Follow-up metabolic/ID workup and urine culture.  Will review further with Dr. Keller, further recommendations to follow  Discussed with family, nurse        Thank you for referring such patient. If you have any questions regarding my consult note, please don't hesitate to call me.     NIKOS Venegas, APRN - CNP    Attending Supervising Physician's Attestation Statement       The patient was seen 5/16/2025 in conjunction with the nurse practitioner with independent history, evaluation and examination. I agree with the note which has been adjusted to reflect my findings, with the addition of the following:       Patient seen today and I discussed with her family  Had a brief episode of obtundation at their admission at home.  No falling or injury.  No witnessed seizure.  She is now waxing and waning open eyes to voice and goes back to sleep  Could not tolerate MRI  Able to move arms or legs spontaneously  No gaze preference  Face is symmetric    Impression:  Acute encephalopathy and syncope, possible vasovagal syncope, vascular or ischemic stroke  A-fib on Eliquis  Hypertension hyperlipidemia    MRI brain was ordered.  No need to add sedation at this point since she is at a high risk for hospital-acquired delirium and encephalopathy  She is already on small dose of Eliquis 2.5 mg x 2  PT and OT  Speech  Aspiration precautions  Metabolic workup  Avoid other sedatives  Continue Coreg and avoid blood pressure below 120 systolic  Telemetry  Follow electrolytes  Stroke education provided for family    DC planning when close to her baseline    Electronically signed by Darwin Stone MD on 5/16/25 at 12:34 PM EDT       This dictation was generated by voice recognition computer software. Although all attempts are made to edit the dictation for

## 2025-05-16 NOTE — PLAN OF CARE
Problem: Chronic Conditions and Co-morbidities  Goal: Patient's chronic conditions and co-morbidity symptoms are monitored and maintained or improved  Outcome: Progressing  Flowsheets (Taken 5/16/2025 1023)  Care Plan - Patient's Chronic Conditions and Co-Morbidity Symptoms are Monitored and Maintained or Improved: Monitor and assess patient's chronic conditions and comorbid symptoms for stability, deterioration, or improvement     Problem: Discharge Planning  Goal: Discharge to home or other facility with appropriate resources  5/16/2025 1023 by Rogelio Landry RN  Outcome: Progressing  5/16/2025 1023 by Rogelio Landry RN  Flowsheets (Taken 5/16/2025 1023)  Discharge to home or other facility with appropriate resources:   Identify barriers to discharge with patient and caregiver   Arrange for needed discharge resources and transportation as appropriate   Refer to discharge planning if patient needs post-hospital services based on physician order or complex needs related to functional status, cognitive ability or social support system     Problem: Pain  Goal: Verbalizes/displays adequate comfort level or baseline comfort level  Outcome: Progressing  Flowsheets (Taken 5/16/2025 1023)  Verbalizes/displays adequate comfort level or baseline comfort level:   Encourage patient to monitor pain and request assistance   Assess pain using appropriate pain scale   Consider cultural and social influences on pain and pain management   Implement non-pharmacological measures as appropriate and evaluate response

## 2025-05-16 NOTE — ED PROVIDER NOTES
54.4 kg (120 lb)   SpO2 96%   BMI 21.26 kg/m²   Constitutional:  89 y.o. female alert, cooperative  HENT:  Atraumatic scalp, mucous membranes moist  Eyes:   Conjunctiva clear, no icterus  Neck:  Supple, no visible JVD, no signs of injury  Cardiovascular:  Regular, no rubs  Thorax & Lungs:  No accessory muscle usage  Abdomen:  Soft, non distended, NT  Back:  No deformity  Genitalia:  Deferred  Rectal:  Deferred  Extremities:  No cyanosis, adequate perfusion  Skin:  Exposed areas warm, dry  Neurologic:  Alert, slurred speech, see NIHSS below, dementia  Psychiatric:  Affect appropriate    NIH Stroke Scale  NIH Stroke Scale Assessed: Yes  Interval: Reassessment  Level of Consciousness (1a): Alert  LOC Questions (1b): Answers one correctly  LOC Commands (1c): Performs both tasks correctly  Best Gaze (2): Normal  Visual (3): No visual loss  Facial Palsy (4): (!) Minor paralysis  Motor Arm, Left (5a): Some effort against gravity  Motor Arm, Right (5b): Drift, but does not hit bed  Motor Leg, Left (6a): Some effort against gravity  Motor Leg, Right (6b): Some effort against gravity  Limb Ataxia (7): Absent  Sensory (8): Normal  Best Language (9): No aphasia  Dysarthria (10): Mild to moderate, slurs some words  Extinction and Inattention (11): No abnormality  Total: 10     MEDICAL DECISION MAKING:  Briefly, this is an 89 y.o.female who presented with concern for possible new stroke.  Hx of CVAs in the past, dementia.  Stroke alert, no TNK recommendation.  Diagnostics below.  Plan is to admit for further care.     For further details of Katherine Vásquez's Emergency Department encounter, please see documentation by advanced practice provider MAURILIO Khna.     Labs Reviewed   URINALYSIS WITH REFLEX TO CULTURE - Abnormal; Notable for the following components:       Result Value    Blood, Urine MODERATE (*)     Protein,  (*)     Leukocyte Esterase, Urine MODERATE (*)     All other components within normal limits   CBC WITH  AUTO DIFFERENTIAL - Abnormal; Notable for the following components:    WBC 13.1 (*)     Neutrophils Absolute 11.7 (*)     All other components within normal limits   COMPREHENSIVE METABOLIC PANEL W/ REFLEX TO MG FOR LOW K - Abnormal; Notable for the following components:    Potassium reflex Magnesium 3.4 (*)     Glucose 144 (*)     BUN 23 (*)     Est, Glom Filt Rate 52 (*)     Albumin/Globulin Ratio 0.9 (*)     Alkaline Phosphatase 164 (*)     All other components within normal limits   BRAIN NATRIURETIC PEPTIDE - Abnormal; Notable for the following components:    NT Pro-BNP 1,899 (*)     All other components within normal limits   APTT - Abnormal; Notable for the following components:    aPTT 21.2 (*)     All other components within normal limits   BASIC METABOLIC PANEL W/ REFLEX TO MG FOR LOW K - Abnormal; Notable for the following components:    Potassium reflex Magnesium 3.2 (*)     Glucose 103 (*)     Est, Glom Filt Rate 59 (*)     All other components within normal limits    Narrative:     Collection has been rescheduled by [facility] at 05/16/2025 04:56 Reason:                   Draw last   MICROSCOPIC URINALYSIS - Abnormal; Notable for the following components:    Bacteria, UA 1+ (*)     WBC,  (*)     All other components within normal limits   CULTURE, URINE   TROPONIN   TROPONIN   PROTIME-INR   MAGNESIUM   CBC    Narrative:     Collection has been rescheduled by [facility] at 05/16/2025 04:56 Reason:                   Draw last   LIPID PANEL    Narrative:     Collection has been rescheduled by [facility] at 05/16/2025 04:56 Reason:                   Draw last   HEMOGLOBIN A1C   MAGNESIUM    Narrative:     Collection has been rescheduled by [facility] at 05/16/2025 04:56 Reason:                   Draw last     RADIOLOGY:   Plain x-rays were viewed by me:   XR CHEST PORTABLE   Final Result   1. Cardiomegaly with unchanged mild-to-moderate pulmonary vascular congestion.         CTA HEAD NECK W CONTRAST   Final Result   1.  No large vessel occlusion in the head or neck.   2. Mild-to-moderate atherosclerosis, otherwise unremarkable CT angiogram of the head and neck.            CT HEAD WO CONTRAST   Final Result   Addendum (preliminary) 1 of 1   ADDENDUM:   Critical results were called by Dr. Jason Silver to Brodie Boydjaron on   5/15/2025 at 1058.         Final   No acute intracranial abnormality.               EKG:  Read by me in the absence of a cardiologist shows: Atrial fibrillation, rate 88, QRS duration normal, axis left, poor R wave progression, nonspecific ST-T wave abnormality    CRITICAL CARE:   Total critical care time of 21 non concurrent  minutes (excludes any time for procedures).  This includes but not limited to vital sign monitoring, medication and/or clinical response to medications, interpretation of diagnostics, review of nursing notes, pertinent record review, discussions about patient condition, consultation time, documentation time.  Critical care due to the patient's stroke like symptoms.    Vitals:    05/16/25 0948 05/16/25 1100 05/16/25 1153 05/16/25 1500   BP:  137/77 137/77 (!) 142/88   Pulse:  76  86   Resp:       Temp:    97.6 °F (36.4 °C)   TempSrc:    Temporal   SpO2:  97%  96%   Weight:   54.4 kg (120 lb)    Height: 1.6 m (5' 3\")  1.6 m (5' 3\")      History from:  Patient family  Limitations to history:  dementia  Chronic Conditions:  has a past medical history of Cerebral artery occlusion with cerebral infarction (HCC), H/O: hysterectomy, Hyperlipidemia, and Hypertension.  Records Reviewed:  Outpatient notes  Disposition Considerations (Tests not ordered but considered, Shared Decision Making, Pt Expectation of Test or Tx.):  MR imaging not deemed clinically necessary in the ED setting    Medications administered: (list can include non ED meds ordered by other providers for patients that are admitted)  Medications   apixaban (ELIQUIS) tablet 2.5 mg (2.5 mg Oral Given 5/16/25 0831)   carvedilol (COREG)

## 2025-05-16 NOTE — PROGRESS NOTES
[facility]    5/16/2025 8:31 AM        Name: Katherine Vásquez .              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 Presented after non-responsive episode at home lasting about 3 minutes. Family reported patient slumped over while using restroom, noted right side facial droop and patient did not speak to them, no noted seizure activity. Admitted to r/o CVA.     Subjective:  Limited secondary to dementia. Resting in bed, daughter at bedside. Patient alert but confused, nonsensical response to questions, does not follow commands consistently. Daughter reports this is pretty much baseline.     Reviewed interval ancillary notes    Current Medications  potassium bicarb-citric acid (EFFER-K) effervescent tablet 20 mEq, BID with meals  apixaban (ELIQUIS) tablet 2.5 mg, BID  carvedilol (COREG) tablet 3.125 mg, BID WC  sodium chloride flush 0.9 % injection 5-40 mL, 2 times per day  sodium chloride flush 0.9 % injection 5-40 mL, PRN  0.9 % sodium chloride infusion, PRN  ondansetron (ZOFRAN-ODT) disintegrating tablet 4 mg, Q8H PRN   Or  ondansetron (ZOFRAN) injection 4 mg, Q6H PRN  polyethylene glycol (GLYCOLAX) packet 17 g, Daily PRN  sulfur hexafluoride microspheres (LUMASON) 60.7-25 MG injection 2 mL, ONCE PRN      Objective:  BP (!) 153/79   Pulse 73   Temp (!) 96.2 °F (35.7 °C) (Temporal)   Resp 17   Ht 1.6 m (5' 3\")   Wt 54.6 kg (120 lb 5.9 oz)   SpO2 98%   BMI 21.32 kg/m²     Intake/Output Summary (Last 24 hours) at 5/16/2025 0831  Last data filed at 5/16/2025 0519  Gross per 24 hour   Intake --   Output 500 ml   Net -500 ml      Wt Readings from Last 3 Encounters:   05/16/25 54.6 kg (120 lb 5.9 oz)   05/15/25 57 kg (125 lb 10.6 oz)   12/17/24 52.2  Neurology consult pending  - Continue apixaban   - At one time patient was taking atorvastatin 40 mg but reported as no longer taking. Will attempt to clarify    Abnormal UA  - UA with moderate leukocyte esterase (), 1+ bacteria  - Will treat as she presented with altered mental status, add ceftriaxone 1 gram  - Urine culture results pending    Persistent atrial fib  - Controlled rate  - Continue carvedilol 3.125 mg, apixaban 2.5 mg     HTN  - BP controlled  - Continue carvedilol 3.125 mg  - Continue to follow    Hypokalemia  - Potassium 3.2, magnesium 1.9  - Replacement ordered      Diet: ADULT DIET; Regular  ADULT ORAL NUTRITION SUPPLEMENT; Breakfast, Lunch, Dinner; Standard High Calorie/High Protein Oral Supplement  Code:DNR-CCA  DVT PPX: apixaban      VICTORINA Bell - CNP   5/16/2025 8:31 AM

## 2025-05-16 NOTE — PROGRESS NOTES
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 Daughter stated she will cut food up into small bite sized pieces, assist with intake and check for pocketing. Daughter is aware of aspiration risk but stated when pt was on modified diets in the past she did not consume them.      Compensatory strategies: Aspiration Precautions , Assist Feed , Check for pocketing , Eat or Feed Slowly, Remain Upright 30-45 min , Small Bites and Sips , Swallow 2 times per bite , Upright as possible with all PO intake     Discharge Recommendations:  No further follow-up appears indicated at this time.     History/Course of Treatment     H&P: Katherine Vásquez is a 89 y.o. female who presented with presents to ER with complaints of strokelike symptoms.  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  Patient was in normal state this morning.  Around 9 AM she got up to use restroom.  While using the restroom she had got really weak.  She slumped over but did not fall or hit the ground.  Her  pathology plan of care:  Family was present and verbalized     If patient discharges prior to next visit, this note will serve as discharge.     Treatment time:  Timed Code Treatment Minutes: 0  Total Treatment Time Minutes: 25    Electronically signed by:  Annika Mazariegos MA CCC-SLP #[id number]  Speech Language Pathologist

## 2025-05-16 NOTE — PROGRESS NOTES
Physical Therapy  Katherine Vásquez  Orders received, chart reviewed. Attempted to see pt for PT initial evaluation however pt is currently HEIDI. Will follow-up as schedule allows.  Thank you,  Deloris Higgins PT, DPT [id number]

## 2025-05-16 NOTE — PROGRESS NOTES
[facility] - Inpatient Rehabilitation Department   Phone: [phone]    Physical Therapy    [x] Initial Evaluation            [] Daily Treatment Note         [] Discharge Summary      Patient: Katherine Vásquez   : 1930   MRN: [id number]   Date of Service:  2025  Admitting Diagnosis: TIA (transient ischemic attack)  Current Admission Summary: Pt is a 89 y/o female who presents to the hospital following an episode in which the pt slumped over, had R sided facial droop, and difficulty speaking for 3 minutes. Of note, pt had a major stroke ~ 1 year ago that left the pt with residual deficits including aphasia, generalized weakness, difficulty ambulating, and worsening memory.      *CT head: \"No acute intracranial abnormality.\"  Past Medical History:  has a past medical history of Cerebral artery occlusion with cerebral infarction (HCC), H/O: hysterectomy, Hyperlipidemia, and Hypertension.  Past Surgical History:  has a past surgical history that includes Hysterectomy.  Discharge Recommendations: Katherine Vásquez scored a 9/24 on the AM-PAC short mobility form. Current research shows that an AM-PAC score of 18 or greater is typically associated with a discharge to the patient's home setting. Based on the patient's AM-PAC score and their current functional mobility deficits, it is recommended that the patient have 2-3 sessions per week of Physical Therapy at d/c to increase the patient's independence.  At this time, this patient demonstrates the endurance and safety to discharge home with HHPT and 224 hour supervision/assistance and a follow up treatment frequency of 2-3x/wk.  Please see assessment section for further patient specific details.    If patient discharges prior to next session this note will serve as a discharge summary.  Please see below for the latest assessment towards goals.     HOME HEALTH CARE: LEVEL 1 STANDARD    - Initial home health evaluation to occur within 24-48 hours, in  education, and equipment evaluation/education    Goals  Patient Goals: Pt unable to state   Short Term Goals:  Time Frame: B discharge  Patient will complete bed mobility at moderate assistance   Patient will complete transfers at moderate assistance   Patient will ambulate 10 ft with use of LRAD at moderate assistance    Above goals reviewed on 5/16/2025.  All goals are ongoing at this time unless indicated above.    Therapy Session Time      Individual Group Co-treatment   Time In     1051   Time Out     1142   Minutes     51     Timed Code Treatment Minutes: 36 Minutes  Total Treatment Minutes: 51 Minutes        Electronically Signed By: Deloris Higgins, PT  Deloris Higgins PT, DPT [id number]

## 2025-05-17 VITALS
HEIGHT: 63 IN | DIASTOLIC BLOOD PRESSURE: 81 MMHG | TEMPERATURE: 97.6 F | WEIGHT: 119.9 LBS | BODY MASS INDEX: 21.25 KG/M2 | RESPIRATION RATE: 16 BRPM | HEART RATE: 84 BPM | SYSTOLIC BLOOD PRESSURE: 149 MMHG | OXYGEN SATURATION: 98 %

## 2025-05-17 LAB
ANION GAP SERPL CALCULATED.3IONS-SCNC: 10 MMOL/L (ref 3–16)
BUN SERPL-MCNC: 29 MG/DL (ref 7–20)
CALCIUM SERPL-MCNC: 9.2 MG/DL (ref 8.3–10.6)
CHLORIDE SERPL-SCNC: 106 MMOL/L (ref 99–110)
CO2 SERPL-SCNC: 22 MMOL/L (ref 21–32)
CREAT SERPL-MCNC: 1 MG/DL (ref 0.6–1.2)
GFR SERPLBLD CREATININE-BSD FMLA CKD-EPI: 52 ML/MIN/{1.73_M2}
GLUCOSE SERPL-MCNC: 169 MG/DL (ref 70–99)
POTASSIUM SERPL-SCNC: 4 MMOL/L (ref 3.5–5.1)
SODIUM SERPL-SCNC: 138 MMOL/L (ref 136–145)

## 2025-05-17 PROCEDURE — 6370000000 HC RX 637 (ALT 250 FOR IP): Performed by: HOSPITALIST

## 2025-05-17 PROCEDURE — 2500000003 HC RX 250 WO HCPCS: Performed by: HOSPITALIST

## 2025-05-17 PROCEDURE — 36415 COLL VENOUS BLD VENIPUNCTURE: CPT

## 2025-05-17 PROCEDURE — G0378 HOSPITAL OBSERVATION PER HR: HCPCS

## 2025-05-17 PROCEDURE — 99232 SBSQ HOSP IP/OBS MODERATE 35: CPT

## 2025-05-17 PROCEDURE — 80048 BASIC METABOLIC PNL TOTAL CA: CPT

## 2025-05-17 RX ADMIN — CARVEDILOL 3.12 MG: 3.12 TABLET, FILM COATED ORAL at 09:10

## 2025-05-17 RX ADMIN — APIXABAN 2.5 MG: 5 TABLET, FILM COATED ORAL at 09:10

## 2025-05-17 RX ADMIN — SODIUM CHLORIDE, PRESERVATIVE FREE 10 ML: 5 INJECTION INTRAVENOUS at 09:10

## 2025-05-17 NOTE — PLAN OF CARE
Shift assessment complete. Vital signs stable. Atrial fib on telemetry. NIHSS completed in flowsheets. Respirations even and unlabored. Medications given crushed in puree per MAR. Pt denies any further needs. Granddaughter at bedside.     Problem: Chronic Conditions and Co-morbidities  Goal: Patient's chronic conditions and co-morbidity symptoms are monitored and maintained or improved  5/16/2025 2030 by Luci Gan, RN  Outcome: Progressing     Problem: Discharge Planning  Goal: Discharge to home or other facility with appropriate resources  5/16/2025 2030 by Luci Gan, RN  Outcome: Progressing     Problem: Pain  Goal: Verbalizes/displays adequate comfort level or baseline comfort level  5/16/2025 2030 by Luci Gan RN  Outcome: Progressing     Problem: Safety - Adult  Goal: Free from fall injury  Outcome: Progressing     Problem: ABCDS Injury Assessment  Goal: Absence of physical injury  Outcome: Progressing     Problem: Skin/Tissue Integrity  Goal: Skin integrity remains intact  Description: 1.  Monitor for areas of redness and/or skin breakdown2.  Assess vascular access sites hourly3.  Every 4-6 hours minimum:  Change oxygen saturation probe site4.  Every 4-6 hours:  If on nasal continuous positive airway pressure, respiratory therapy assess nares and determine need for appliance change or resting period  Outcome: Progressing     Problem: Neurosensory - Adult  Goal: Remains free of injury related to seizures activity  Outcome: Progressing     Problem: Cardiovascular - Adult  Goal: Maintains optimal cardiac output and hemodynamic stability  Outcome: Progressing     Problem: Musculoskeletal - Adult  Goal: Return mobility to safest level of function  Outcome: Progressing     Problem: Nutrition Deficit:  Goal: Optimize nutritional status  Outcome: Progressing

## 2025-05-17 NOTE — PROGRESS NOTES
Katherine Vásquez  Neurology Follow-up  [facility] Neurology    Date of Service: 5/17/2025    Subjective:   CC: Follow up today regarding: Acute encephalopathy    Events noted. Chart and lab reviewed.  Patient seen this morning she is resting in bed.  Family is at bedside, reports patient appears back to her baseline.  They would like to take her home.  Unable to tolerate MRI yesterday.  Patient appears more awake and alert compared to yesterday.  She denies any headaches, chest pain, neck pain.  Other review of systems limited      ROS : A 10-12 system review could not be obtained due to poor cooperation and confusion.       I personally reviewed and updated social history, past medical history, medications, allergy, surgical history, and family history as documented in the patient's electronic health records.          Objective:  Constitutional:   Vitals:    05/17/25 0258 05/17/25 0745 05/17/25 0824 05/17/25 1145   BP: (!) 159/87 (!) 143/84  (!) 149/81   Pulse: 89 93  84   Resp: 18 17  16   Temp: 98.1 °F (36.7 °C) 98.3 °F (36.8 °C)  97.6 °F (36.4 °C)   TempSrc: Oral Oral  Oral   SpO2: 96% 98%  98%   Weight:   54.4 kg (119 lb 14.4 oz)    Height:           General appearance: Does not appear in acute distress  Mental Status:   AAO to self only  Attention and concentration: Good, improved compared to yesterday  Language: Dysarthric speech,  Chronic  Recent memory: Impaired  Remote memory: Impaired   Poor fund of knowledge  Cranial Nerves:   II: Pupils: equal, round, reactive to light  III,IV,VI: No gaze preference. No nystagmus  V: Facial sensation: Grossly unremarkable.  VII: Facial strength and movements: intact and symmetric  XII: Tongue movements : midline  Musculoskeletal:  The patient can move all 4 extremities, chronic left-sided extremity weakness due to remote stroke  Tone: Normal tone.  No rigidity.  Reflexes: symmetric 2+ in both arms and legs  Coordination: No abnormal movements, no

## 2025-05-17 NOTE — DISCHARGE INSTRUCTIONS
Your information:  Name: Katherine Vásquez  : 1930    Your Discharge Instructions    What to do after you leave the hospital:    Read, review and familiarize yourself with the information provided below and in a separate packet on   TIA, Stroke Symptoms, Long Term Care for Stroke    Diet: Regular    Recommended activity:   As tolerated  Avoid strenuous activity until instructed by your physician to resume; balance rest with periods of light to normal activity.     If you experience any of the following: Unusual or inadequately controlled pain; unusual transient shortness of breath; recurrent or persistent nausea, heartburn, palpitations or lightheadedness; increased swelling; increased fatigue; fever >100; please follow up with your PCP or go to the Emergency Room.      Home Health/ Outpatient Services: [place]        What symptoms or health problems do you need to look out for after you leave the hospital? Call your physician if you have any of these symptoms.     If you have sudden, severe shortness of breath or shortness of breath while resting.  Pain, tenderness, warmth or redness in your calf.  Temperature  greater than 101?.  Persistent diarrhea, nausea or vomiting.  If you are unable to eat, or unable to drink 5 glasses of fluid a day for more than one day.  If you experience the same pain or other symptoms that brought you to the hospital.  If you become lightheaded or dizzy.      If you think something is seriously wrong go to the nearest emergency room!    Call 911 for any EMERGENCY and go to the nearest hospital!

## 2025-05-17 NOTE — DISCHARGE SUMMARY
[facility] DISCHARGE SUMMARY    Patient Demographics    Patient. Katherine Vásquez  Date of Birth. 11/8/1930  MRN. [id number]     Primary care provider. Chandra Arrieta MD  (Tel: [phone])    Admit date: 5/15/2025    Discharge date (blank if same as Note Date):   Note Date: 5/17/2025     Reason for Hospitalization.   Chief Complaint   Patient presents with    Aphasia     Pt via EMS from home, family said pt was blank staring for three minutes and wouldn't respond, then went back to normal, has baseine right side facial droop and left sided weakness from prior strokes and hx of dementia          Significant Findings.   Principal Problem:    TIA (transient ischemic attack)  Active Problems:    Severe malnutrition    Stroke-like symptoms    Anticoagulated  Resolved Problems:    * No resolved hospital problems. *       Problems and results from this hospitalization that need follow up.  Hypokalemia. Follow up BMP requested.    Significant test results and incidental findings.  XR CHEST PORTABLE   Final Result   1. Cardiomegaly with unchanged mild-to-moderate pulmonary vascular congestion.           CTA HEAD NECK W CONTRAST   Final Result   1. No large vessel occlusion in the head or neck.   2. Mild-to-moderate atherosclerosis, otherwise unremarkable CT angiogram of the head and neck.               CT HEAD WO CONTRAST   Final Result   Addendum (preliminary) 1 of 1   ADDENDUM:   Critical results were called by Dr. Jason Silver to Brodie Whatley on   5/15/2025 at 1058.           Final   No acute intracranial abnormality.     Echo 5/16/2025:    Left Ventricle: Normal left ventricular systolic function with a visually estimated EF of 60 - 65%. Left ventricle size is normal. Mildly increased wall thickness. Findings consistent with mild concentric hypertrophy. Normal wall motion. Indeterminate diastolic function due to  tablet  Commonly known as: NORVASC     amLODIPine 5 MG tablet  Commonly known as: NORVASC     aspirin 81 MG EC tablet  Commonly known as: Ecotrin Low Strength     atorvastatin 40 MG tablet  Commonly known as: LIPITOR     Vitamin D 25 MCG (1000 UT) Tabs tablet  Commonly known as: CHOLECALCIFEROL          DC meds above reported as no longer taking    Discharge recommendations given to patient.  Follow Up. PCP in 1 week   Disposition.  Home with home care  Activity. activity as tolerated  Diet: ADULT DIET; Regular  ADULT ORAL NUTRITION SUPPLEMENT; Breakfast, Lunch, Dinner; Standard High Calorie/High Protein Oral Supplement      Spent 35 minutes in discharge process.    Signed:  VICTORINA Bell - CNP     5/17/2025 9:35 AM

## 2025-05-17 NOTE — PROGRESS NOTES
Discharge instructions reviewed with pt and daughter. Answered any questions or concerns regarding discharge. IV and Tele removed without complications. Personal belongings gathered. Pt transported to lobby via wheelchair in stable condition.

## 2025-05-17 NOTE — CARE COORDINATION
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   If applicable PT/OT recommendation at Discharge: Home OT/PT  DME recommendation by PT/OT:NA  Confirmed discharge plan: Yes  with whom patient's daughter  If pt confirmed DC plan does family need to be contacted by CM No if yes who - daughter at bedside.  Discharge Plan: Patient discharging to home with family. [facility] has accepted the patient.     Date of Last IMM Given: NA    Reviewed chart.  Role of discharge planner explained and patient verbalized understanding. Discharge order is noted.    Has Home O2 in place on admit:  Yes  Informed of need to bring portable home O2 tank on day of discharge for nursing to connect prior to leaving:   Not Indicated  Verbalized agreement/Understanding:   Not Indicated  Pt is being d/c'd to home today. Pt's O2 sats are 98% on RA.    Discharge timeout done with Rogelio MCHUGH. All discharge needs and concerns addressed.

## 2025-05-19 ENCOUNTER — TELEPHONE (OUTPATIENT)
Age: 89
End: 2025-05-19

## 2025-05-19 NOTE — TELEPHONE ENCOUNTER
Care Transitions Initial Follow Up Call    Outreach made within 2 business days of discharge: Yes    Patient: Katherine Vásquez Patient : 1930   MRN: [id number]  Reason for Admission: Facial Droop  Discharge Date: 25       Spoke with: Eugene Benton (POA)    Discharge department/facility: [facility] Interactive Patient Contact:  Was patient able to fill all prescriptions: Yes  Was patient instructed to bring all medications to the follow-up visit: Yes  Is patient taking all medications as directed in the discharge summary? Yes  Does patient understand their discharge instructions: Yes  Does patient have questions or concerns that need addressed prior to 7-14 day follow up office visit: no    Additional needs identified to be addressed with provider               Scheduled appointment with PCP within 7-14 days    Follow Up  Future Appointments   Date Time Provider Department Center   2025  2:40 PM Chandra Arrieta MD [facility]       Ginnette Reyes, MA

## 2025-05-20 NOTE — TELEPHONE ENCOUNTER
No in office appointments are available. Unable to schedule hospital F/U with a virtualist d/t system restrictions

## 2025-06-02 ENCOUNTER — TELEMEDICINE (OUTPATIENT)
Age: 89
End: 2025-06-02
Payer: MEDICARE

## 2025-06-02 DIAGNOSIS — I48.91 ATRIAL FIBRILLATION AND FLUTTER (HCC): Primary | ICD-10-CM

## 2025-06-02 DIAGNOSIS — G45.9 TIA (TRANSIENT ISCHEMIC ATTACK): ICD-10-CM

## 2025-06-02 DIAGNOSIS — E46 MALNUTRITION, UNSPECIFIED TYPE: ICD-10-CM

## 2025-06-02 DIAGNOSIS — I48.92 ATRIAL FIBRILLATION AND FLUTTER (HCC): Primary | ICD-10-CM

## 2025-06-02 DIAGNOSIS — Z09 HOSPITAL DISCHARGE FOLLOW-UP: ICD-10-CM

## 2025-06-02 DIAGNOSIS — E87.6 HYPOKALEMIA: ICD-10-CM

## 2025-06-02 DIAGNOSIS — I10 HTN (HYPERTENSION), BENIGN: ICD-10-CM

## 2025-06-02 PROCEDURE — 1159F MED LIST DOCD IN RCRD: CPT | Performed by: NURSE PRACTITIONER

## 2025-06-02 PROCEDURE — 99214 OFFICE O/P EST MOD 30 MIN: CPT | Performed by: NURSE PRACTITIONER

## 2025-06-02 PROCEDURE — 1111F DSCHRG MED/CURRENT MED MERGE: CPT | Performed by: NURSE PRACTITIONER

## 2025-06-02 PROCEDURE — 1123F ACP DISCUSS/DSCN MKR DOCD: CPT | Performed by: NURSE PRACTITIONER

## 2025-06-02 RX ORDER — AMLODIPINE BESYLATE 5 MG/1
5 TABLET ORAL DAILY
COMMUNITY

## 2025-06-02 RX ORDER — CARVEDILOL 3.12 MG/1
3.12 TABLET ORAL 2 TIMES DAILY WITH MEALS
Qty: 180 TABLET | Refills: 0 | Status: SHIPPED | OUTPATIENT
Start: 2025-06-02

## 2025-06-02 NOTE — PROGRESS NOTES
Post-Discharge Transitional Care Follow Up      Katherine Vásquez   YOB: 1930    Date of Office Visit:  6/2/2025  Date of Hospital Admission: 5/15/25  Date of Hospital Discharge: 5/17/25  Readmission Risk Score(high >=14%. Medium >=10%):No data recorded    Care management risk score Rising risk (score 2-5) and Complex Care (Scores >=6): No Risk Score On File     Non face to face  following discharge, date last encounter closed (first attempt may have been earlier): 05/19/2025     Call initiated 2 business days of discharge: Yes     Below is the assessment and plan developed based on review of pertinent history, physical exam, labs, studies, and medications.  Atrial fibrillation and flutter (HCC)  -     apixaban (ELIQUIS) 2.5 MG TABS tablet; Take 1 tablet by mouth 2 times daily, Disp-180 tablet, R-1Normal  TIA (transient ischemic attack)  - Home PT, OT, and Speech Therapies at home  HTN (hypertension), benign        -      amlodipine (NORVASC) 5 mg tablet, oral, daily  -     carvedilol (COREG) 3.125 MG tablet; Take 1 tablet by mouth 2 times daily (with meals), Disp-180 tablet, R-0Normal  Hypokalemia  -Resume her daily half banana  -     Basic Metabolic Panel; Future  Malnutrition, unspecified type  - Liquid meal supplementation two times daily  Hospital Discharge Follow Up  - Discharge summary medications reconciled  Medical Decision Making: straightforward  No follow-ups on file.         Subjective:   Katherine Vásquez is a 89 year old patient who is being seen today for a hospital follow up. She was taken via EMS to the hospital on May 15th due to having a TIA at home. She was having stroke like symptoms, and  when she arrived at the hospital she was also found to have hypokalemia, malnutrition, and an abnormal UA. She was treated for a UTI while she was in the hospital that resolved. She has persistent A-fib and hypertension. She is anticoagulated and her hypertension is controlled with carvedilol 3.125

## 2025-06-30 ENCOUNTER — TELEPHONE (OUTPATIENT)
Age: 89
End: 2025-06-30

## 2025-06-30 DIAGNOSIS — I63.9 CEREBROVASCULAR ACCIDENT (CVA), UNSPECIFIED MECHANISM (HCC): Primary | ICD-10-CM

## 2025-06-30 NOTE — TELEPHONE ENCOUNTER
Pt grandjennifer argueta called with [facility], asking what needs to be done so that pt can stRT her PT bck.   Rikki:[phone]

## 2025-06-30 NOTE — TELEPHONE ENCOUNTER
New order for PT and Speech therapy pended. Per Dr. Nguyen's note on 5/16/25 he recommended patient have speech and PT. Please sign order is appropriate and send back to pool so it can be faxed.

## 2025-07-10 NOTE — H&P
HOSPITALISTS HISTORY AND PHYSICAL    5/15/2025 1:08 PM    Patient Information:  KATHERINE VÁSQUEZ is a 89 y.o. female [id number]  PCP:  Chandra Arrieta MD (Tel: [phone] )    Chief complaint:    Chief Complaint   Patient presents with    Aphasia     Pt via EMS from home, family said pt was blank staring for three minutes and wouldn't respond, then went back to normal, has baseine right side facial droop and left sided weakness from prior strokes and hx of dementia         History of Present Illness:  Katherine Vásquez is a 89 y.o. female who presented with presents to ER with complaints of strokelike symptoms.  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  Patient was in normal state this morning.  Around 9 AM she got up to use restroom.  While using the restroom she had got really weak.  She slumped over but did not fall or hit the ground.  Her family patient apparently had a right-sided facial droop and was not speaking to them for about 3 minutes.  No seizure-like activity fpatient also fo taking Eliquis.  For A-fib denies missing dose.  M on arrival to ER emergency room patient did have slurred speech.    REVIEW OF SYSTEMS:   Limited due to patient cooperation.    Past Medical History:   has no past medical history on file.     Past Surgical History:   has a past surgical history that includes Hysterectomy.     Medications:  No current facility-administered medications on file prior to encounter.     Current Outpatient Medications on File Prior to Encounter   Medication Sig Dispense Refill    carvedilol (COREG) 3.125 MG tablet TAKE 1 TABLET BY MOUTH TWICE A DAY WITH FOOD 180 tablet 0    atorvastatin (LIPITOR) 40 MG tablet TAKE 1 TABLET BY MOUTH EVERY DAY 90 tablet 1    amLODIPine (NORVASC) 10 MG  [[id number]],[[id number]] [[id number]],[[id number]],[[id number]]

## 2025-08-27 ENCOUNTER — OFFICE VISIT (OUTPATIENT)
Age: 89
End: 2025-08-27
Payer: COMMERCIAL

## 2025-08-27 VITALS
BODY MASS INDEX: 21.26 KG/M2 | WEIGHT: 120 LBS | DIASTOLIC BLOOD PRESSURE: 84 MMHG | HEART RATE: 79 BPM | OXYGEN SATURATION: 97 % | SYSTOLIC BLOOD PRESSURE: 129 MMHG

## 2025-08-27 DIAGNOSIS — I10 HTN (HYPERTENSION), BENIGN: Primary | ICD-10-CM

## 2025-08-27 DIAGNOSIS — I48.92 ATRIAL FIBRILLATION AND FLUTTER (HCC): ICD-10-CM

## 2025-08-27 DIAGNOSIS — I48.91 ATRIAL FIBRILLATION AND FLUTTER (HCC): ICD-10-CM

## 2025-08-27 DIAGNOSIS — Z86.73 HISTORY OF TIA (TRANSIENT ISCHEMIC ATTACK): ICD-10-CM

## 2025-08-27 PROCEDURE — 99213 OFFICE O/P EST LOW 20 MIN: CPT

## 2025-08-27 PROCEDURE — 1090F PRES/ABSN URINE INCON ASSESS: CPT

## 2025-08-27 PROCEDURE — 1123F ACP DISCUSS/DSCN MKR DOCD: CPT

## 2025-08-27 PROCEDURE — 1159F MED LIST DOCD IN RCRD: CPT

## 2025-08-27 PROCEDURE — G8427 DOCREV CUR MEDS BY ELIG CLIN: HCPCS

## 2025-08-27 PROCEDURE — G8420 CALC BMI NORM PARAMETERS: HCPCS

## 2025-08-27 PROCEDURE — 1036F TOBACCO NON-USER: CPT

## 2025-08-27 RX ORDER — CARVEDILOL 3.12 MG/1
3.12 TABLET ORAL 2 TIMES DAILY WITH MEALS
Qty: 180 TABLET | Refills: 0 | Status: SHIPPED | OUTPATIENT
Start: 2025-08-27

## 2025-08-27 RX ORDER — AMLODIPINE BESYLATE 5 MG/1
5 TABLET ORAL DAILY
Qty: 90 TABLET | Refills: 1 | Status: SHIPPED | OUTPATIENT
Start: 2025-08-27

## 2025-08-27 RX ORDER — ATORVASTATIN CALCIUM 40 MG/1
40 TABLET, FILM COATED ORAL DAILY
Qty: 90 TABLET | Refills: 0 | Status: SHIPPED | OUTPATIENT
Start: 2025-08-27

## 2025-08-27 ASSESSMENT — ENCOUNTER SYMPTOMS
DIARRHEA: 0
SHORTNESS OF BREATH: 0
NAUSEA: 0
VOMITING: 0

## 2025-08-27 ASSESSMENT — PATIENT HEALTH QUESTIONNAIRE - PHQ9
SUM OF ALL RESPONSES TO PHQ QUESTIONS 1-9: 0
SUM OF ALL RESPONSES TO PHQ QUESTIONS 1-9: 0
2. FEELING DOWN, DEPRESSED OR HOPELESS: NOT AT ALL
SUM OF ALL RESPONSES TO PHQ QUESTIONS 1-9: 0
SUM OF ALL RESPONSES TO PHQ QUESTIONS 1-9: 0
1. LITTLE INTEREST OR PLEASURE IN DOING THINGS: NOT AT ALL